# Patient Record
Sex: FEMALE | Race: WHITE | NOT HISPANIC OR LATINO | Employment: PART TIME | URBAN - METROPOLITAN AREA
[De-identification: names, ages, dates, MRNs, and addresses within clinical notes are randomized per-mention and may not be internally consistent; named-entity substitution may affect disease eponyms.]

---

## 2020-10-02 ENCOUNTER — OFFICE VISIT (OUTPATIENT)
Dept: CARDIOLOGY | Facility: MEDICAL CENTER | Age: 70
End: 2020-10-02
Payer: MEDICARE

## 2020-10-02 VITALS
HEIGHT: 64 IN | OXYGEN SATURATION: 97 % | DIASTOLIC BLOOD PRESSURE: 84 MMHG | WEIGHT: 237 LBS | SYSTOLIC BLOOD PRESSURE: 126 MMHG | BODY MASS INDEX: 40.46 KG/M2 | HEART RATE: 74 BPM

## 2020-10-02 DIAGNOSIS — I47.10 SVT (SUPRAVENTRICULAR TACHYCARDIA) (HCC): ICD-10-CM

## 2020-10-02 LAB — EKG IMPRESSION: NORMAL

## 2020-10-02 PROCEDURE — 93000 ELECTROCARDIOGRAM COMPLETE: CPT | Performed by: INTERNAL MEDICINE

## 2020-10-02 PROCEDURE — 99204 OFFICE O/P NEW MOD 45 MIN: CPT | Performed by: INTERNAL MEDICINE

## 2020-10-02 RX ORDER — CEPHALEXIN 500 MG/1
CAPSULE ORAL
Status: ON HOLD | COMMUNITY
Start: 2020-09-02 | End: 2022-01-01

## 2020-10-02 RX ORDER — OMEPRAZOLE 20 MG/1
CAPSULE, DELAYED RELEASE ORAL
Status: ON HOLD | COMMUNITY
Start: 2020-08-17 | End: 2022-01-01

## 2020-10-02 NOTE — PROGRESS NOTES
Arrhythmia Clinic Note (New patient)     DOS: 10/2/2020    Referring physician: Self referred    Chief complaint/Reason for consult: SVT    HPI: 69-year-old female without significant past medical history, gastric reflux, and paroxysmal SVT, presenting for evaluation.  She was first diagnosed with paroxysmal SVT in December.  Around [place], she had a significant episode of palpitations lasting 20 to 30 minutes.  She felt unwell and had to put her head between her legs.  She did not need to go to the emergency room.  She followed up with a Holter monitor which did show evidence of SVT, sustained, lasting 84 beats, nocturnally.  She does not feel this.  She does feel palpitations once or twice a month, usually last a few minutes.  She is able to terminate these with deep breathing through a towel.  She was prescribed metoprolol, but has not started taking it, she does not like taking medications.  She denies syncope or presyncope.  No chest pain on exertion.  No shortness of breath on exertion.    ROS (+ highlighted in bold):  Constitutional: Fevers/chills/fatigue/weightloss  HEENT: Blurry vision/eye pain/sore throat/hearing loss  Respiratory: Shortness of breath/cough  Cardiovascular: Chest pain/palpitations/edema/orthopnea/syncope  GI: Nausea/vomitting/diarrhea  MSK: Arthralgias/myagias/muscle weakness  Skin: Rash/sores  Neurological: Numbness/tremors/vertigo  Endocrine: Excessive thirst/polyuria/cold intolerance/heat intolerance  Psych: Depression/anxiety    History reviewed. No pertinent past medical history.   GERD  Paroxysmal SVT    History reviewed. No pertinent surgical history.    Social History     Socioeconomic History   • Marital status:      Spouse name: Not on file   • Number of children: Not on file   • Years of education: Not on file   • Highest education level: Not on file   Occupational History   • Not on file   Social Needs   • Financial resource strain: Not on file   • Food insecurity      "Worry: Not on file     Inability: Not on file   • Transportation needs     Medical: Not on file     Non-medical: Not on file   Tobacco Use   • Smoking status: Never Smoker   • Smokeless tobacco: Never Used   Substance and Sexual Activity   • Alcohol use: Not on file   • Drug use: Not on file   • Sexual activity: Not on file   Lifestyle   • Physical activity     Days per week: Not on file     Minutes per session: Not on file   • Stress: Not on file   Relationships   • Social connections     Talks on phone: Not on file     Gets together: Not on file     Attends Faith service: Not on file     Active member of club or organization: Not on file     Attends meetings of clubs or organizations: Not on file     Relationship status: Not on file   • Intimate partner violence     Fear of current or ex partner: Not on file     Emotionally abused: Not on file     Physically abused: Not on file     Forced sexual activity: Not on file   Other Topics Concern   • Not on file   Social History Narrative   • Not on file       History reviewed. No pertinent family history.   No family history of premature coronary disease    Allergies   Allergen Reactions   • Sulfa Drugs      itchy mouth and eyes       Current Outpatient Medications   Medication Sig Dispense Refill   • omeprazole (PRILOSEC) 20 MG delayed-release capsule take 1 tablet by mouth once daily 30 MINUTES BEFORE BREAKFAST     • cephALEXin (KEFLEX) 500 MG Cap take 2 capsules by mouth every 12 hours for 10 days     • metoprolol (LOPRESSOR) 25 MG Tab Take 25 mg by mouth. TWICE A DAY WITH FOOD       No current facility-administered medications for this visit.        Physical Exam:  Vitals:    10/02/20 1409   BP: 126/84   BP Location: Left arm   Patient Position: Sitting   BP Cuff Size: Large adult   Pulse: 74   SpO2: 97%   Weight: 107.5 kg (237 lb)   Height: 1.626 m (5' 4\")     General appearance: NAD, conversant   Eyes: anicteric sclerae, moist conjunctivae; no lid-lag; " PERRLA  HENT: Atraumatic; oropharynx clear with moist mucous membranes and no mucosal ulcerations; normal hard and soft palate  Neck: Trachea midline; FROM, supple, no thyromegaly or lymphadenopathy  Lungs: CTA, with normal respiratory effort and no intercostal retractions  CV: RRR, no MRGs, no JVD   Abdomen: Soft, non-tender; no masses or HSM  Extremities: No peripheral edema or extremity lymphadenopathy  Skin: Normal temperature, turgor and texture; no rash, ulcers or subcutaneous nodules  Psych: Appropriate affect, alert and oriented to person, place and time    Data:  Data from outside facility scanned into media tab and reviewed    EKG interpreted by me: Normal sinus rhythm    Holter monitor reviewed: 84 beat run of narrow complex SVT at 167 bpm    Impression/Plan:  1. SVT (supraventricular tachycardia) (HCC)  EKG     1.  Paroxysmal SVT.  Probable AVNRT or atrial tachycardia.  It is not terribly bothersome for her and she is able to terminate these with vagal maneuvers.  She does not want to take metoprolol that she needs to.  We did discuss EP study and ablation.  She wants to continue monitoring and see how she does.  If these symptoms become worse, she may elect to start taking her metoprolol, and she is always welcome to call and discuss an EP study with ablation if she would like.    Follow-up in 6 months    Simon Jessica MD  Cardiac Electrophysiology

## 2022-01-01 ENCOUNTER — APPOINTMENT (OUTPATIENT)
Dept: RADIOLOGY | Facility: MEDICAL CENTER | Age: 72
DRG: 023 | End: 2022-01-01
Attending: EMERGENCY MEDICINE
Payer: MEDICARE

## 2022-01-01 ENCOUNTER — NON-PROVIDER VISIT (OUTPATIENT)
Dept: CARDIOLOGY | Facility: MEDICAL CENTER | Age: 72
End: 2022-01-01
Payer: MEDICARE

## 2022-01-01 ENCOUNTER — APPOINTMENT (OUTPATIENT)
Dept: RADIOLOGY | Facility: MEDICAL CENTER | Age: 72
End: 2022-01-01
Attending: EMERGENCY MEDICINE
Payer: MEDICARE

## 2022-01-01 ENCOUNTER — APPOINTMENT (OUTPATIENT)
Dept: RADIOLOGY | Facility: MEDICAL CENTER | Age: 72
DRG: 023 | End: 2022-01-01
Attending: STUDENT IN AN ORGANIZED HEALTH CARE EDUCATION/TRAINING PROGRAM
Payer: MEDICARE

## 2022-01-01 ENCOUNTER — HOSPITAL ENCOUNTER (OUTPATIENT)
Facility: MEDICAL CENTER | Age: 72
End: 2022-04-16
Attending: EMERGENCY MEDICINE | Admitting: STUDENT IN AN ORGANIZED HEALTH CARE EDUCATION/TRAINING PROGRAM
Payer: MEDICARE

## 2022-01-01 ENCOUNTER — PATIENT OUTREACH (OUTPATIENT)
Dept: HEALTH INFORMATION MANAGEMENT | Facility: OTHER | Age: 72
End: 2022-01-01
Payer: MEDICARE

## 2022-01-01 ENCOUNTER — APPOINTMENT (OUTPATIENT)
Dept: CARDIOLOGY | Facility: MEDICAL CENTER | Age: 72
DRG: 023 | End: 2022-01-01
Attending: STUDENT IN AN ORGANIZED HEALTH CARE EDUCATION/TRAINING PROGRAM
Payer: MEDICARE

## 2022-01-01 ENCOUNTER — HOSPITAL ENCOUNTER (INPATIENT)
Facility: REHABILITATION | Age: 72
End: 2022-01-01
Attending: PHYSICAL MEDICINE & REHABILITATION | Admitting: PHYSICAL MEDICINE & REHABILITATION
Payer: MEDICARE

## 2022-01-01 ENCOUNTER — APPOINTMENT (OUTPATIENT)
Dept: RADIOLOGY | Facility: MEDICAL CENTER | Age: 72
End: 2022-01-01
Payer: MEDICARE

## 2022-01-01 ENCOUNTER — HOSPITAL ENCOUNTER (INPATIENT)
Facility: MEDICAL CENTER | Age: 72
LOS: 9 days | DRG: 023 | End: 2022-03-23
Attending: EMERGENCY MEDICINE | Admitting: INTERNAL MEDICINE
Payer: MEDICARE

## 2022-01-01 ENCOUNTER — APPOINTMENT (OUTPATIENT)
Dept: RADIOLOGY | Facility: MEDICAL CENTER | Age: 72
DRG: 023 | End: 2022-01-01
Attending: NURSE PRACTITIONER
Payer: MEDICARE

## 2022-01-01 VITALS
SYSTOLIC BLOOD PRESSURE: 114 MMHG | DIASTOLIC BLOOD PRESSURE: 54 MMHG | TEMPERATURE: 96.7 F | OXYGEN SATURATION: 92 % | WEIGHT: 205.91 LBS | BODY MASS INDEX: 33.09 KG/M2 | HEIGHT: 66 IN | RESPIRATION RATE: 18 BRPM | HEART RATE: 89 BPM

## 2022-01-01 VITALS
SYSTOLIC BLOOD PRESSURE: 185 MMHG | OXYGEN SATURATION: 82 % | DIASTOLIC BLOOD PRESSURE: 100 MMHG | BODY MASS INDEX: 34.16 KG/M2 | HEIGHT: 65 IN | WEIGHT: 205 LBS

## 2022-01-01 DIAGNOSIS — I47.29 NSVT (NONSUSTAINED VENTRICULAR TACHYCARDIA) (HCC): ICD-10-CM

## 2022-01-01 DIAGNOSIS — K86.89 PANCREATIC MASS: ICD-10-CM

## 2022-01-01 DIAGNOSIS — I63.9 ACUTE EMBOLIC STROKE (HCC): ICD-10-CM

## 2022-01-01 DIAGNOSIS — I63.9 CEREBROVASCULAR ACCIDENT (CVA), UNSPECIFIED MECHANISM (HCC): ICD-10-CM

## 2022-01-01 DIAGNOSIS — K21.00 GASTROESOPHAGEAL REFLUX DISEASE WITH ESOPHAGITIS WITHOUT HEMORRHAGE: ICD-10-CM

## 2022-01-01 DIAGNOSIS — I47.10 SVT (SUPRAVENTRICULAR TACHYCARDIA) (HCC): ICD-10-CM

## 2022-01-01 DIAGNOSIS — N39.0 URINARY TRACT INFECTION WITHOUT HEMATURIA, SITE UNSPECIFIED: ICD-10-CM

## 2022-01-01 DIAGNOSIS — I47.10 PAROXYSMAL SVT (SUPRAVENTRICULAR TACHYCARDIA) (HCC): ICD-10-CM

## 2022-01-01 LAB
ABO + RH BLD: NORMAL
ABO GROUP BLD: NORMAL
ALBUMIN SERPL BCP-MCNC: 1.8 G/DL (ref 3.2–4.9)
ALBUMIN SERPL BCP-MCNC: 2.6 G/DL (ref 3.2–4.9)
ALBUMIN SERPL BCP-MCNC: 2.8 G/DL (ref 3.2–4.9)
ALBUMIN SERPL BCP-MCNC: 2.9 G/DL (ref 3.2–4.9)
ALBUMIN SERPL BCP-MCNC: 3 G/DL (ref 3.2–4.9)
ALBUMIN SERPL BCP-MCNC: 3.7 G/DL (ref 3.2–4.9)
ALBUMIN/GLOB SERPL: 0.5 G/DL
ALBUMIN/GLOB SERPL: 0.9 G/DL
ALBUMIN/GLOB SERPL: 0.9 G/DL
ALBUMIN/GLOB SERPL: 1.1 G/DL
ALBUMIN/GLOB SERPL: 1.1 G/DL
ALP SERPL-CCNC: 171 U/L (ref 30–99)
ALP SERPL-CCNC: 176 U/L (ref 30–99)
ALP SERPL-CCNC: 212 U/L (ref 30–99)
ALP SERPL-CCNC: 229 U/L (ref 30–99)
ALP SERPL-CCNC: 231 U/L (ref 30–99)
ALP SERPL-CCNC: 636 U/L (ref 30–99)
ALT SERPL-CCNC: 21 U/L (ref 2–50)
ALT SERPL-CCNC: 23 U/L (ref 2–50)
ALT SERPL-CCNC: 244 U/L (ref 2–50)
ALT SERPL-CCNC: 26 U/L (ref 2–50)
ALT SERPL-CCNC: 29 U/L (ref 2–50)
ALT SERPL-CCNC: 39 U/L (ref 2–50)
ANION GAP SERPL CALC-SCNC: 10 MMOL/L (ref 7–16)
ANION GAP SERPL CALC-SCNC: 10 MMOL/L (ref 7–16)
ANION GAP SERPL CALC-SCNC: 11 MMOL/L (ref 7–16)
ANION GAP SERPL CALC-SCNC: 12 MMOL/L (ref 7–16)
ANION GAP SERPL CALC-SCNC: 30 MMOL/L (ref 7–16)
ANION GAP SERPL CALC-SCNC: 8 MMOL/L (ref 7–16)
ANION GAP SERPL CALC-SCNC: 9 MMOL/L (ref 7–16)
ANION GAP SERPL CALC-SCNC: 9 MMOL/L (ref 7–16)
ANISOCYTOSIS BLD QL SMEAR: ABNORMAL
APPEARANCE UR: ABNORMAL
APTT PPP: 198.5 SEC (ref 24.7–36)
APTT PPP: 30.2 SEC (ref 24.7–36)
AST SERPL-CCNC: 1412 U/L (ref 12–45)
AST SERPL-CCNC: 47 U/L (ref 12–45)
AST SERPL-CCNC: 55 U/L (ref 12–45)
AST SERPL-CCNC: 63 U/L (ref 12–45)
AST SERPL-CCNC: 70 U/L (ref 12–45)
AST SERPL-CCNC: 70 U/L (ref 12–45)
BACTERIA #/AREA URNS HPF: ABNORMAL /HPF
BACTERIA UR CULT: ABNORMAL
BACTERIA UR CULT: ABNORMAL
BASOPHILS # BLD AUTO: 0.6 % (ref 0–1.8)
BASOPHILS # BLD AUTO: 0.6 % (ref 0–1.8)
BASOPHILS # BLD AUTO: 0.7 % (ref 0–1.8)
BASOPHILS # BLD AUTO: 0.8 % (ref 0–1.8)
BASOPHILS # BLD AUTO: 0.8 % (ref 0–1.8)
BASOPHILS # BLD AUTO: 0.9 % (ref 0–1.8)
BASOPHILS # BLD: 0.08 K/UL (ref 0–0.12)
BASOPHILS # BLD: 0.09 K/UL (ref 0–0.12)
BASOPHILS # BLD: 0.1 K/UL (ref 0–0.12)
BASOPHILS # BLD: 0.11 K/UL (ref 0–0.12)
BASOPHILS # BLD: 0.11 K/UL (ref 0–0.12)
BASOPHILS # BLD: 0.22 K/UL (ref 0–0.12)
BILIRUB CONJ SERPL-MCNC: <0.2 MG/DL (ref 0.1–0.5)
BILIRUB INDIRECT SERPL-MCNC: ABNORMAL MG/DL (ref 0–1)
BILIRUB SERPL-MCNC: 0.7 MG/DL (ref 0.1–1.5)
BILIRUB SERPL-MCNC: 0.8 MG/DL (ref 0.1–1.5)
BILIRUB SERPL-MCNC: 0.8 MG/DL (ref 0.1–1.5)
BILIRUB SERPL-MCNC: 1 MG/DL (ref 0.1–1.5)
BILIRUB SERPL-MCNC: 1.2 MG/DL (ref 0.1–1.5)
BILIRUB SERPL-MCNC: 2.5 MG/DL (ref 0.1–1.5)
BILIRUB UR QL STRIP.AUTO: NEGATIVE
BLD GP AB SCN SERPL QL: NORMAL
BUN SERPL-MCNC: 12 MG/DL (ref 8–22)
BUN SERPL-MCNC: 13 MG/DL (ref 8–22)
BUN SERPL-MCNC: 17 MG/DL (ref 8–22)
BUN SERPL-MCNC: 22 MG/DL (ref 8–22)
BUN SERPL-MCNC: 29 MG/DL (ref 8–22)
BUN SERPL-MCNC: 33 MG/DL (ref 8–22)
BUN SERPL-MCNC: 34 MG/DL (ref 8–22)
BUN SERPL-MCNC: 57 MG/DL (ref 8–22)
BURR CELLS BLD QL SMEAR: NORMAL
CALCIUM SERPL-MCNC: 7.4 MG/DL (ref 8.5–10.5)
CALCIUM SERPL-MCNC: 7.9 MG/DL (ref 8.5–10.5)
CALCIUM SERPL-MCNC: 8 MG/DL (ref 8.5–10.5)
CALCIUM SERPL-MCNC: 8.1 MG/DL (ref 8.5–10.5)
CALCIUM SERPL-MCNC: 8.2 MG/DL (ref 8.5–10.5)
CALCIUM SERPL-MCNC: 8.3 MG/DL (ref 8.5–10.5)
CALCIUM SERPL-MCNC: 9.4 MG/DL (ref 8.5–10.5)
CHLORIDE SERPL-SCNC: 100 MMOL/L (ref 96–112)
CHLORIDE SERPL-SCNC: 100 MMOL/L (ref 96–112)
CHLORIDE SERPL-SCNC: 101 MMOL/L (ref 96–112)
CHLORIDE SERPL-SCNC: 102 MMOL/L (ref 96–112)
CHLORIDE SERPL-SCNC: 104 MMOL/L (ref 96–112)
CHLORIDE SERPL-SCNC: 105 MMOL/L (ref 96–112)
CHLORIDE SERPL-SCNC: 88 MMOL/L (ref 96–112)
CHOLEST SERPL-MCNC: 156 MG/DL (ref 100–199)
CO2 SERPL-SCNC: 21 MMOL/L (ref 20–33)
CO2 SERPL-SCNC: 23 MMOL/L (ref 20–33)
CO2 SERPL-SCNC: 24 MMOL/L (ref 20–33)
CO2 SERPL-SCNC: 25 MMOL/L (ref 20–33)
CO2 SERPL-SCNC: 25 MMOL/L (ref 20–33)
CO2 SERPL-SCNC: 26 MMOL/L (ref 20–33)
CO2 SERPL-SCNC: 26 MMOL/L (ref 20–33)
CO2 SERPL-SCNC: 6 MMOL/L (ref 20–33)
COLOR UR: YELLOW
CREAT SERPL-MCNC: 0.69 MG/DL (ref 0.5–1.4)
CREAT SERPL-MCNC: 0.71 MG/DL (ref 0.5–1.4)
CREAT SERPL-MCNC: 0.76 MG/DL (ref 0.5–1.4)
CREAT SERPL-MCNC: 0.78 MG/DL (ref 0.5–1.4)
CREAT SERPL-MCNC: 0.78 MG/DL (ref 0.5–1.4)
CREAT SERPL-MCNC: 0.81 MG/DL (ref 0.5–1.4)
CREAT SERPL-MCNC: 0.82 MG/DL (ref 0.5–1.4)
CREAT SERPL-MCNC: 0.88 MG/DL (ref 0.5–1.4)
CREAT SERPL-MCNC: 0.94 MG/DL (ref 0.5–1.4)
CREAT SERPL-MCNC: 3.56 MG/DL (ref 0.5–1.4)
EKG IMPRESSION: NORMAL
EOSINOPHIL # BLD AUTO: 0.06 K/UL (ref 0–0.51)
EOSINOPHIL # BLD AUTO: 0.15 K/UL (ref 0–0.51)
EOSINOPHIL # BLD AUTO: 0.16 K/UL (ref 0–0.51)
EOSINOPHIL # BLD AUTO: 0.18 K/UL (ref 0–0.51)
EOSINOPHIL # BLD AUTO: 0.22 K/UL (ref 0–0.51)
EOSINOPHIL # BLD AUTO: 0.23 K/UL (ref 0–0.51)
EOSINOPHIL # BLD AUTO: 0.29 K/UL (ref 0–0.51)
EOSINOPHIL # BLD AUTO: 0.3 K/UL (ref 0–0.51)
EOSINOPHIL NFR BLD: 0.5 % (ref 0–6.9)
EOSINOPHIL NFR BLD: 0.9 % (ref 0–6.9)
EOSINOPHIL NFR BLD: 1.1 % (ref 0–6.9)
EOSINOPHIL NFR BLD: 1.4 % (ref 0–6.9)
EOSINOPHIL NFR BLD: 1.5 % (ref 0–6.9)
EOSINOPHIL NFR BLD: 1.6 % (ref 0–6.9)
EOSINOPHIL NFR BLD: 1.9 % (ref 0–6.9)
EOSINOPHIL NFR BLD: 2.4 % (ref 0–6.9)
EPI CELLS #/AREA URNS HPF: NEGATIVE /HPF
ERYTHROCYTE [DISTWIDTH] IN BLOOD BY AUTOMATED COUNT: 49.1 FL (ref 35.9–50)
ERYTHROCYTE [DISTWIDTH] IN BLOOD BY AUTOMATED COUNT: 49.6 FL (ref 35.9–50)
ERYTHROCYTE [DISTWIDTH] IN BLOOD BY AUTOMATED COUNT: 49.7 FL (ref 35.9–50)
ERYTHROCYTE [DISTWIDTH] IN BLOOD BY AUTOMATED COUNT: 49.8 FL (ref 35.9–50)
ERYTHROCYTE [DISTWIDTH] IN BLOOD BY AUTOMATED COUNT: 50.1 FL (ref 35.9–50)
ERYTHROCYTE [DISTWIDTH] IN BLOOD BY AUTOMATED COUNT: 50.4 FL (ref 35.9–50)
ERYTHROCYTE [DISTWIDTH] IN BLOOD BY AUTOMATED COUNT: 51 FL (ref 35.9–50)
ERYTHROCYTE [DISTWIDTH] IN BLOOD BY AUTOMATED COUNT: 51.2 FL (ref 35.9–50)
ERYTHROCYTE [DISTWIDTH] IN BLOOD BY AUTOMATED COUNT: 51.5 FL (ref 35.9–50)
ERYTHROCYTE [DISTWIDTH] IN BLOOD BY AUTOMATED COUNT: 75.8 FL (ref 35.9–50)
ERYTHROCYTE [SEDIMENTATION RATE] IN BLOOD BY WESTERGREN METHOD: 23 MM/HOUR (ref 0–25)
EST. AVERAGE GLUCOSE BLD GHB EST-MCNC: 77 MG/DL
ETHANOL BLD-MCNC: <10.1 MG/DL (ref 0–10)
FOLATE SERPL-MCNC: 12.8 NG/ML
GFR SERPLBLD CREATININE-BSD FMLA CKD-EPI: 13 ML/MIN/1.73 M 2
GFR SERPLBLD CREATININE-BSD FMLA CKD-EPI: 64 ML/MIN/1.73 M 2
GFR SERPLBLD CREATININE-BSD FMLA CKD-EPI: 76 ML/MIN/1.73 M 2
GFR SERPLBLD CREATININE-BSD FMLA CKD-EPI: 77 ML/MIN/1.73 M 2
GFR SERPLBLD CREATININE-BSD FMLA CKD-EPI: 81 ML/MIN/1.73 M 2
GFR SERPLBLD CREATININE-BSD FMLA CKD-EPI: 81 ML/MIN/1.73 M 2
GFR SERPLBLD CREATININE-BSD FMLA CKD-EPI: 83 ML/MIN/1.73 M 2
GFR SERPLBLD CREATININE-BSD FMLA CKD-EPI: 90 ML/MIN/1.73 M 2
GFR SERPLBLD CREATININE-BSD FMLA CKD-EPI: 92 ML/MIN/1.73 M 2
GLOBULIN SER CALC-MCNC: 2.7 G/DL (ref 1.9–3.5)
GLOBULIN SER CALC-MCNC: 2.9 G/DL (ref 1.9–3.5)
GLOBULIN SER CALC-MCNC: 3.1 G/DL (ref 1.9–3.5)
GLOBULIN SER CALC-MCNC: 3.4 G/DL (ref 1.9–3.5)
GLOBULIN SER CALC-MCNC: 3.8 G/DL (ref 1.9–3.5)
GLUCOSE SERPL-MCNC: 104 MG/DL (ref 65–99)
GLUCOSE SERPL-MCNC: 104 MG/DL (ref 65–99)
GLUCOSE SERPL-MCNC: 105 MG/DL (ref 65–99)
GLUCOSE SERPL-MCNC: 106 MG/DL (ref 65–99)
GLUCOSE SERPL-MCNC: 110 MG/DL (ref 65–99)
GLUCOSE SERPL-MCNC: 110 MG/DL (ref 65–99)
GLUCOSE SERPL-MCNC: 114 MG/DL (ref 65–99)
GLUCOSE SERPL-MCNC: 118 MG/DL (ref 65–99)
GLUCOSE SERPL-MCNC: 119 MG/DL (ref 65–99)
GLUCOSE SERPL-MCNC: 45 MG/DL (ref 65–99)
GLUCOSE UR STRIP.AUTO-MCNC: NEGATIVE MG/DL
HBA1C MFR BLD: 4.3 % (ref 4–5.6)
HCG SERPL QL: NEGATIVE
HCT VFR BLD AUTO: 25.3 % (ref 37–47)
HCT VFR BLD AUTO: 25.5 % (ref 37–47)
HCT VFR BLD AUTO: 26.4 % (ref 37–47)
HCT VFR BLD AUTO: 27.5 % (ref 37–47)
HCT VFR BLD AUTO: 27.7 % (ref 37–47)
HCT VFR BLD AUTO: 27.7 % (ref 37–47)
HCT VFR BLD AUTO: 28.5 % (ref 37–47)
HCT VFR BLD AUTO: 28.6 % (ref 37–47)
HCT VFR BLD AUTO: 29.5 % (ref 37–47)
HCT VFR BLD AUTO: 35.8 % (ref 37–47)
HDLC SERPL-MCNC: 31 MG/DL
HEMOCCULT STL QL: NEGATIVE
HGB BLD-MCNC: 11.7 G/DL (ref 12–16)
HGB BLD-MCNC: 7.9 G/DL (ref 12–16)
HGB BLD-MCNC: 8 G/DL (ref 12–16)
HGB BLD-MCNC: 8 G/DL (ref 12–16)
HGB BLD-MCNC: 8.3 G/DL (ref 12–16)
HGB BLD-MCNC: 8.8 G/DL (ref 12–16)
HGB BLD-MCNC: 8.9 G/DL (ref 12–16)
HGB BLD-MCNC: 9 G/DL (ref 12–16)
HGB BLD-MCNC: 9.2 G/DL (ref 12–16)
HGB BLD-MCNC: 9.4 G/DL (ref 12–16)
HGB RETIC QN AUTO: 35.8 PG/CELL (ref 29–35)
HYALINE CASTS #/AREA URNS LPF: ABNORMAL /LPF
IMM GRANULOCYTES # BLD AUTO: 0.04 K/UL (ref 0–0.11)
IMM GRANULOCYTES # BLD AUTO: 0.05 K/UL (ref 0–0.11)
IMM GRANULOCYTES # BLD AUTO: 0.06 K/UL (ref 0–0.11)
IMM GRANULOCYTES # BLD AUTO: 0.07 K/UL (ref 0–0.11)
IMM GRANULOCYTES # BLD AUTO: 0.09 K/UL (ref 0–0.11)
IMM GRANULOCYTES # BLD AUTO: 0.1 K/UL (ref 0–0.11)
IMM GRANULOCYTES # BLD AUTO: 0.11 K/UL (ref 0–0.11)
IMM GRANULOCYTES NFR BLD AUTO: 0.4 % (ref 0–0.9)
IMM GRANULOCYTES NFR BLD AUTO: 0.4 % (ref 0–0.9)
IMM GRANULOCYTES NFR BLD AUTO: 0.5 % (ref 0–0.9)
IMM GRANULOCYTES NFR BLD AUTO: 0.6 % (ref 0–0.9)
IMM GRANULOCYTES NFR BLD AUTO: 0.6 % (ref 0–0.9)
IMM GRANULOCYTES NFR BLD AUTO: 0.7 % (ref 0–0.9)
IMM GRANULOCYTES NFR BLD AUTO: 0.7 % (ref 0–0.9)
IMM RETICS NFR: 22.3 % (ref 9.3–17.4)
INR PPP: 1.41 (ref 0.87–1.13)
INR PPP: >=10 (ref 0.87–1.13)
KETONES UR STRIP.AUTO-MCNC: NEGATIVE MG/DL
LACTATE BLD-SCNC: 14.4 MMOL/L (ref 0.5–2)
LDLC SERPL CALC-MCNC: 100 MG/DL
LEUKOCYTE ESTERASE UR QL STRIP.AUTO: ABNORMAL
LIPASE SERPL-CCNC: 408 U/L (ref 11–82)
LV EJECT FRACT  99904: 60
LV EJECT FRACT MOD 2C 99903: 59.83
LV EJECT FRACT MOD 4C 99902: 71.45
LV EJECT FRACT MOD BP 99901: 64.16
LYMPHOCYTES # BLD AUTO: 1.43 K/UL (ref 1–4.8)
LYMPHOCYTES # BLD AUTO: 1.53 K/UL (ref 1–4.8)
LYMPHOCYTES # BLD AUTO: 1.64 K/UL (ref 1–4.8)
LYMPHOCYTES # BLD AUTO: 1.81 K/UL (ref 1–4.8)
LYMPHOCYTES # BLD AUTO: 1.87 K/UL (ref 1–4.8)
LYMPHOCYTES # BLD AUTO: 1.9 K/UL (ref 1–4.8)
LYMPHOCYTES # BLD AUTO: 1.92 K/UL (ref 1–4.8)
LYMPHOCYTES # BLD AUTO: 2.17 K/UL (ref 1–4.8)
LYMPHOCYTES NFR BLD: 11.1 % (ref 22–41)
LYMPHOCYTES NFR BLD: 11.6 % (ref 22–41)
LYMPHOCYTES NFR BLD: 12.6 % (ref 22–41)
LYMPHOCYTES NFR BLD: 14.4 % (ref 22–41)
LYMPHOCYTES NFR BLD: 14.7 % (ref 22–41)
LYMPHOCYTES NFR BLD: 16.3 % (ref 22–41)
LYMPHOCYTES NFR BLD: 18.2 % (ref 22–41)
LYMPHOCYTES NFR BLD: 6.3 % (ref 22–41)
MACROCYTES BLD QL SMEAR: ABNORMAL
MAGNESIUM SERPL-MCNC: 2.1 MG/DL (ref 1.5–2.5)
MAGNESIUM SERPL-MCNC: 3.5 MG/DL (ref 1.5–2.5)
MANUAL DIFF BLD: NORMAL
MCH RBC QN AUTO: 32.6 PG (ref 27–33)
MCH RBC QN AUTO: 32.6 PG (ref 27–33)
MCH RBC QN AUTO: 32.7 PG (ref 27–33)
MCH RBC QN AUTO: 32.7 PG (ref 27–33)
MCH RBC QN AUTO: 32.8 PG (ref 27–33)
MCH RBC QN AUTO: 32.9 PG (ref 27–33)
MCH RBC QN AUTO: 33 PG (ref 27–33)
MCH RBC QN AUTO: 33 PG (ref 27–33)
MCH RBC QN AUTO: 33.1 PG (ref 27–33)
MCH RBC QN AUTO: 33.1 PG (ref 27–33)
MCHC RBC AUTO-ENTMCNC: 28.5 G/DL (ref 33.6–35)
MCHC RBC AUTO-ENTMCNC: 31.4 G/DL (ref 33.6–35)
MCHC RBC AUTO-ENTMCNC: 31.4 G/DL (ref 33.6–35)
MCHC RBC AUTO-ENTMCNC: 31.5 G/DL (ref 33.6–35)
MCHC RBC AUTO-ENTMCNC: 31.6 G/DL (ref 33.6–35)
MCHC RBC AUTO-ENTMCNC: 31.9 G/DL (ref 33.6–35)
MCHC RBC AUTO-ENTMCNC: 32 G/DL (ref 33.6–35)
MCHC RBC AUTO-ENTMCNC: 32.1 G/DL (ref 33.6–35)
MCHC RBC AUTO-ENTMCNC: 32.3 G/DL (ref 33.6–35)
MCHC RBC AUTO-ENTMCNC: 32.7 G/DL (ref 33.6–35)
MCV RBC AUTO: 100.8 FL (ref 81.4–97.8)
MCV RBC AUTO: 101.5 FL (ref 81.4–97.8)
MCV RBC AUTO: 102.2 FL (ref 81.4–97.8)
MCV RBC AUTO: 102.2 FL (ref 81.4–97.8)
MCV RBC AUTO: 102.4 FL (ref 81.4–97.8)
MCV RBC AUTO: 103.7 FL (ref 81.4–97.8)
MCV RBC AUTO: 103.9 FL (ref 81.4–97.8)
MCV RBC AUTO: 104.9 FL (ref 81.4–97.8)
MCV RBC AUTO: 105.1 FL (ref 81.4–97.8)
MCV RBC AUTO: 115.9 FL (ref 81.4–97.8)
METAMYELOCYTES NFR BLD MANUAL: 3.6 %
MICRO URNS: ABNORMAL
MONOCYTES # BLD AUTO: 0.87 K/UL (ref 0–0.85)
MONOCYTES # BLD AUTO: 1 K/UL (ref 0–0.85)
MONOCYTES # BLD AUTO: 1.14 K/UL (ref 0–0.85)
MONOCYTES # BLD AUTO: 1.25 K/UL (ref 0–0.85)
MONOCYTES # BLD AUTO: 1.26 K/UL (ref 0–0.85)
MONOCYTES # BLD AUTO: 1.31 K/UL (ref 0–0.85)
MONOCYTES # BLD AUTO: 1.52 K/UL (ref 0–0.85)
MONOCYTES # BLD AUTO: 1.69 K/UL (ref 0–0.85)
MONOCYTES NFR BLD AUTO: 10 % (ref 0–13.4)
MONOCYTES NFR BLD AUTO: 10.1 % (ref 0–13.4)
MONOCYTES NFR BLD AUTO: 10.3 % (ref 0–13.4)
MONOCYTES NFR BLD AUTO: 10.5 % (ref 0–13.4)
MONOCYTES NFR BLD AUTO: 10.6 % (ref 0–13.4)
MONOCYTES NFR BLD AUTO: 3.6 % (ref 0–13.4)
MONOCYTES NFR BLD AUTO: 8.6 % (ref 0–13.4)
MONOCYTES NFR BLD AUTO: 9.9 % (ref 0–13.4)
MORPHOLOGY BLD-IMP: NORMAL
NEUTROPHILS # BLD AUTO: 11.28 K/UL (ref 2–7.15)
NEUTROPHILS # BLD AUTO: 11.45 K/UL (ref 2–7.15)
NEUTROPHILS # BLD AUTO: 12.32 K/UL (ref 2–7.15)
NEUTROPHILS # BLD AUTO: 20.36 K/UL (ref 2–7.15)
NEUTROPHILS # BLD AUTO: 7.25 K/UL (ref 2–7.15)
NEUTROPHILS # BLD AUTO: 8.17 K/UL (ref 2–7.15)
NEUTROPHILS # BLD AUTO: 8.31 K/UL (ref 2–7.15)
NEUTROPHILS # BLD AUTO: 8.75 K/UL (ref 2–7.15)
NEUTROPHILS NFR BLD: 69.5 % (ref 44–72)
NEUTROPHILS NFR BLD: 71.1 % (ref 44–72)
NEUTROPHILS NFR BLD: 71.1 % (ref 44–72)
NEUTROPHILS NFR BLD: 72.8 % (ref 44–72)
NEUTROPHILS NFR BLD: 74.2 % (ref 44–72)
NEUTROPHILS NFR BLD: 75.3 % (ref 44–72)
NEUTROPHILS NFR BLD: 77.9 % (ref 44–72)
NEUTROPHILS NFR BLD: 82 % (ref 44–72)
NEUTS BAND NFR BLD MANUAL: 1.8 % (ref 0–10)
NITRITE UR QL STRIP.AUTO: POSITIVE
NRBC # BLD AUTO: 0 K/UL
NRBC # BLD AUTO: 0.09 K/UL
NRBC BLD-RTO: 0 /100 WBC
NRBC BLD-RTO: 0.4 /100 WBC
NT-PROBNP SERPL IA-MCNC: 7297 PG/ML (ref 0–125)
PH UR STRIP.AUTO: 6.5 [PH] (ref 5–8)
PHOSPHATE SERPL-MCNC: 10.7 MG/DL (ref 2.5–4.5)
PLATELET # BLD AUTO: 179 K/UL (ref 164–446)
PLATELET # BLD AUTO: 198 K/UL (ref 164–446)
PLATELET # BLD AUTO: 202 K/UL (ref 164–446)
PLATELET # BLD AUTO: 204 K/UL (ref 164–446)
PLATELET # BLD AUTO: 211 K/UL (ref 164–446)
PLATELET # BLD AUTO: 223 K/UL (ref 164–446)
PLATELET # BLD AUTO: 246 K/UL (ref 164–446)
PLATELET # BLD AUTO: 250 K/UL (ref 164–446)
PLATELET # BLD AUTO: 251 K/UL (ref 164–446)
PLATELET # BLD AUTO: 55 K/UL (ref 164–446)
PLATELET BLD QL SMEAR: NORMAL
PLATELETS.RETICULATED NFR BLD AUTO: 12.9 K/UL (ref 0.6–13.1)
PMV BLD AUTO: 10 FL (ref 9–12.9)
PMV BLD AUTO: 10.3 FL (ref 9–12.9)
PMV BLD AUTO: 10.4 FL (ref 9–12.9)
PMV BLD AUTO: 10.5 FL (ref 9–12.9)
PMV BLD AUTO: 10.6 FL (ref 9–12.9)
PMV BLD AUTO: 10.6 FL (ref 9–12.9)
PMV BLD AUTO: 10.8 FL (ref 9–12.9)
PMV BLD AUTO: 11.2 FL (ref 9–12.9)
PMV BLD AUTO: 13.7 FL (ref 9–12.9)
PMV BLD AUTO: 9.8 FL (ref 9–12.9)
POIKILOCYTOSIS BLD QL SMEAR: NORMAL
POLYCHROMASIA BLD QL SMEAR: NORMAL
POTASSIUM SERPL-SCNC: 4.2 MMOL/L (ref 3.6–5.5)
POTASSIUM SERPL-SCNC: 4.3 MMOL/L (ref 3.6–5.5)
POTASSIUM SERPL-SCNC: 4.3 MMOL/L (ref 3.6–5.5)
POTASSIUM SERPL-SCNC: 4.4 MMOL/L (ref 3.6–5.5)
POTASSIUM SERPL-SCNC: 4.4 MMOL/L (ref 3.6–5.5)
POTASSIUM SERPL-SCNC: 4.5 MMOL/L (ref 3.6–5.5)
POTASSIUM SERPL-SCNC: 4.6 MMOL/L (ref 3.6–5.5)
POTASSIUM SERPL-SCNC: 7.3 MMOL/L (ref 3.6–5.5)
PROMYELOCYTES NFR BLD MANUAL: 0.9 %
PROT SERPL-MCNC: 5.5 G/DL (ref 6–8.2)
PROT SERPL-MCNC: 5.6 G/DL (ref 6–8.2)
PROT SERPL-MCNC: 5.7 G/DL (ref 6–8.2)
PROT SERPL-MCNC: 5.7 G/DL (ref 6–8.2)
PROT SERPL-MCNC: 5.9 G/DL (ref 6–8.2)
PROT SERPL-MCNC: 7.1 G/DL (ref 6–8.2)
PROT UR QL STRIP: 30 MG/DL
PROTHROMBIN TIME: 16.8 SEC (ref 12–14.6)
PROTHROMBIN TIME: >120 SEC (ref 12–14.6)
RBC # BLD AUTO: 2.39 M/UL (ref 4.2–5.4)
RBC # BLD AUTO: 2.43 M/UL (ref 4.2–5.4)
RBC # BLD AUTO: 2.44 M/UL (ref 4.2–5.4)
RBC # BLD AUTO: 2.54 M/UL (ref 4.2–5.4)
RBC # BLD AUTO: 2.69 M/UL (ref 4.2–5.4)
RBC # BLD AUTO: 2.72 M/UL (ref 4.2–5.4)
RBC # BLD AUTO: 2.73 M/UL (ref 4.2–5.4)
RBC # BLD AUTO: 2.79 M/UL (ref 4.2–5.4)
RBC # BLD AUTO: 2.88 M/UL (ref 4.2–5.4)
RBC # BLD AUTO: 3.55 M/UL (ref 4.2–5.4)
RBC # URNS HPF: ABNORMAL /HPF
RBC BLD AUTO: PRESENT
RBC UR QL AUTO: ABNORMAL
RETICS # AUTO: 0.11 M/UL (ref 0.04–0.06)
RETICS/RBC NFR: 4.4 % (ref 0.8–2.1)
RH BLD: NORMAL
SCHISTOCYTES BLD QL SMEAR: NORMAL
SIGNIFICANT IND 70042: ABNORMAL
SITE SITE: ABNORMAL
SODIUM SERPL-SCNC: 124 MMOL/L (ref 135–145)
SODIUM SERPL-SCNC: 134 MMOL/L (ref 135–145)
SODIUM SERPL-SCNC: 135 MMOL/L (ref 135–145)
SODIUM SERPL-SCNC: 136 MMOL/L (ref 135–145)
SODIUM SERPL-SCNC: 136 MMOL/L (ref 135–145)
SODIUM SERPL-SCNC: 137 MMOL/L (ref 135–145)
SODIUM SERPL-SCNC: 138 MMOL/L (ref 135–145)
SOURCE SOURCE: ABNORMAL
SP GR UR STRIP.AUTO: 1.03
TRIGL SERPL-MCNC: 125 MG/DL (ref 0–149)
TROPONIN T SERPL-MCNC: 395 NG/L (ref 6–19)
TROPONIN T SERPL-MCNC: 51 NG/L (ref 6–19)
TSH SERPL DL<=0.005 MIU/L-ACNC: 2.53 UIU/ML (ref 0.38–5.33)
UROBILINOGEN UR STRIP.AUTO-MCNC: 1 MG/DL
VIT B12 SERPL-MCNC: 3515 PG/ML (ref 211–911)
WBC # BLD AUTO: 10 K/UL (ref 4.8–10.8)
WBC # BLD AUTO: 11.5 K/UL (ref 4.8–10.8)
WBC # BLD AUTO: 12 K/UL (ref 4.8–10.8)
WBC # BLD AUTO: 12.3 K/UL (ref 4.8–10.8)
WBC # BLD AUTO: 13.2 K/UL (ref 4.8–10.8)
WBC # BLD AUTO: 14.7 K/UL (ref 4.8–10.8)
WBC # BLD AUTO: 15.2 K/UL (ref 4.8–10.8)
WBC # BLD AUTO: 15.8 K/UL (ref 4.8–10.8)
WBC # BLD AUTO: 16.4 K/UL (ref 4.8–10.8)
WBC # BLD AUTO: 24.3 K/UL (ref 4.8–10.8)
WBC #/AREA URNS HPF: ABNORMAL /HPF

## 2022-01-01 PROCEDURE — 700101 HCHG RX REV CODE 250

## 2022-01-01 PROCEDURE — 700111 HCHG RX REV CODE 636 W/ 250 OVERRIDE (IP): Performed by: STUDENT IN AN ORGANIZED HEALTH CARE EDUCATION/TRAINING PROGRAM

## 2022-01-01 PROCEDURE — 700117 HCHG RX CONTRAST REV CODE 255: Performed by: EMERGENCY MEDICINE

## 2022-01-01 PROCEDURE — 99223 1ST HOSP IP/OBS HIGH 75: CPT | Mod: FS | Performed by: STUDENT IN AN ORGANIZED HEALTH CARE EDUCATION/TRAINING PROGRAM

## 2022-01-01 PROCEDURE — A9270 NON-COVERED ITEM OR SERVICE: HCPCS | Performed by: STUDENT IN AN ORGANIZED HEALTH CARE EDUCATION/TRAINING PROGRAM

## 2022-01-01 PROCEDURE — 80048 BASIC METABOLIC PNL TOTAL CA: CPT

## 2022-01-01 PROCEDURE — 03CG3ZZ EXTIRPATION OF MATTER FROM INTRACRANIAL ARTERY, PERCUTANEOUS APPROACH: ICD-10-PCS | Performed by: RADIOLOGY

## 2022-01-01 PROCEDURE — 96374 THER/PROPH/DIAG INJ IV PUSH: CPT

## 2022-01-01 PROCEDURE — 36415 COLL VENOUS BLD VENIPUNCTURE: CPT

## 2022-01-01 PROCEDURE — 93306 TTE W/DOPPLER COMPLETE: CPT

## 2022-01-01 PROCEDURE — 97535 SELF CARE MNGMENT TRAINING: CPT

## 2022-01-01 PROCEDURE — 96374 THER/PROPH/DIAG INJ IV PUSH: CPT | Mod: XU

## 2022-01-01 PROCEDURE — A9270 NON-COVERED ITEM OR SERVICE: HCPCS | Performed by: HOSPITALIST

## 2022-01-01 PROCEDURE — 700102 HCHG RX REV CODE 250 W/ 637 OVERRIDE(OP): Performed by: STUDENT IN AN ORGANIZED HEALTH CARE EDUCATION/TRAINING PROGRAM

## 2022-01-01 PROCEDURE — 83735 ASSAY OF MAGNESIUM: CPT

## 2022-01-01 PROCEDURE — 700117 HCHG RX CONTRAST REV CODE 255: Performed by: NURSE PRACTITIONER

## 2022-01-01 PROCEDURE — 85025 COMPLETE CBC W/AUTO DIFF WBC: CPT

## 2022-01-01 PROCEDURE — 71045 X-RAY EXAM CHEST 1 VIEW: CPT

## 2022-01-01 PROCEDURE — 770020 HCHG ROOM/CARE - TELE (206)

## 2022-01-01 PROCEDURE — 94002 VENT MGMT INPAT INIT DAY: CPT

## 2022-01-01 PROCEDURE — 99285 EMERGENCY DEPT VISIT HI MDM: CPT

## 2022-01-01 PROCEDURE — 700105 HCHG RX REV CODE 258: Performed by: STUDENT IN AN ORGANIZED HEALTH CARE EDUCATION/TRAINING PROGRAM

## 2022-01-01 PROCEDURE — 70450 CT HEAD/BRAIN W/O DYE: CPT | Mod: MC

## 2022-01-01 PROCEDURE — 84703 CHORIONIC GONADOTROPIN ASSAY: CPT

## 2022-01-01 PROCEDURE — 700111 HCHG RX REV CODE 636 W/ 250 OVERRIDE (IP): Performed by: EMERGENCY MEDICINE

## 2022-01-01 PROCEDURE — 83036 HEMOGLOBIN GLYCOSYLATED A1C: CPT

## 2022-01-01 PROCEDURE — 700111 HCHG RX REV CODE 636 W/ 250 OVERRIDE (IP): Performed by: HOSPITALIST

## 2022-01-01 PROCEDURE — 700111 HCHG RX REV CODE 636 W/ 250 OVERRIDE (IP)

## 2022-01-01 PROCEDURE — 83880 ASSAY OF NATRIURETIC PEPTIDE: CPT

## 2022-01-01 PROCEDURE — 86900 BLOOD TYPING SEROLOGIC ABO: CPT

## 2022-01-01 PROCEDURE — 70551 MRI BRAIN STEM W/O DYE: CPT | Mod: MG

## 2022-01-01 PROCEDURE — 85007 BL SMEAR W/DIFF WBC COUNT: CPT

## 2022-01-01 PROCEDURE — 700102 HCHG RX REV CODE 250 W/ 637 OVERRIDE(OP): Performed by: NURSE PRACTITIONER

## 2022-01-01 PROCEDURE — 80061 LIPID PANEL: CPT

## 2022-01-01 PROCEDURE — 99220 PR INITIAL OBSERVATION CARE,LEVL III: CPT | Performed by: STUDENT IN AN ORGANIZED HEALTH CARE EDUCATION/TRAINING PROGRAM

## 2022-01-01 PROCEDURE — 700102 HCHG RX REV CODE 250 W/ 637 OVERRIDE(OP): Performed by: HOSPITALIST

## 2022-01-01 PROCEDURE — 82746 ASSAY OF FOLIC ACID SERUM: CPT

## 2022-01-01 PROCEDURE — 80053 COMPREHEN METABOLIC PANEL: CPT

## 2022-01-01 PROCEDURE — 85046 RETICYTE/HGB CONCENTRATE: CPT

## 2022-01-01 PROCEDURE — C9113 INJ PANTOPRAZOLE SODIUM, VIA: HCPCS | Performed by: STUDENT IN AN ORGANIZED HEALTH CARE EDUCATION/TRAINING PROGRAM

## 2022-01-01 PROCEDURE — 99222 1ST HOSP IP/OBS MODERATE 55: CPT | Performed by: HOSPITALIST

## 2022-01-01 PROCEDURE — 99232 SBSQ HOSP IP/OBS MODERATE 35: CPT | Performed by: NURSE PRACTITIONER

## 2022-01-01 PROCEDURE — 85652 RBC SED RATE AUTOMATED: CPT

## 2022-01-01 PROCEDURE — 92950 HEART/LUNG RESUSCITATION CPR: CPT

## 2022-01-01 PROCEDURE — 70450 CT HEAD/BRAIN W/O DYE: CPT | Mod: MG

## 2022-01-01 PROCEDURE — 97530 THERAPEUTIC ACTIVITIES: CPT

## 2022-01-01 PROCEDURE — 85730 THROMBOPLASTIN TIME PARTIAL: CPT

## 2022-01-01 PROCEDURE — 99232 SBSQ HOSP IP/OBS MODERATE 35: CPT | Performed by: STUDENT IN AN ORGANIZED HEALTH CARE EDUCATION/TRAINING PROGRAM

## 2022-01-01 PROCEDURE — 700105 HCHG RX REV CODE 258: Performed by: EMERGENCY MEDICINE

## 2022-01-01 PROCEDURE — A9270 NON-COVERED ITEM OR SERVICE: HCPCS | Performed by: NURSE PRACTITIONER

## 2022-01-01 PROCEDURE — 700101 HCHG RX REV CODE 250: Performed by: EMERGENCY MEDICINE

## 2022-01-01 PROCEDURE — 84484 ASSAY OF TROPONIN QUANT: CPT

## 2022-01-01 PROCEDURE — 700101 HCHG RX REV CODE 250: Performed by: STUDENT IN AN ORGANIZED HEALTH CARE EDUCATION/TRAINING PROGRAM

## 2022-01-01 PROCEDURE — G0378 HOSPITAL OBSERVATION PER HR: HCPCS

## 2022-01-01 PROCEDURE — 99232 SBSQ HOSP IP/OBS MODERATE 35: CPT | Performed by: HOSPITALIST

## 2022-01-01 PROCEDURE — 99239 HOSP IP/OBS DSCHRG MGMT >30: CPT | Performed by: HOSPITALIST

## 2022-01-01 PROCEDURE — 82272 OCCULT BLD FECES 1-3 TESTS: CPT

## 2022-01-01 PROCEDURE — 97162 PT EVAL MOD COMPLEX 30 MIN: CPT

## 2022-01-01 PROCEDURE — 92526 ORAL FUNCTION THERAPY: CPT

## 2022-01-01 PROCEDURE — 99291 CRITICAL CARE FIRST HOUR: CPT | Performed by: NURSE PRACTITIONER

## 2022-01-01 PROCEDURE — 86850 RBC ANTIBODY SCREEN: CPT

## 2022-01-01 PROCEDURE — 4410270 IR-THROMBO MECHANICAL ARTERY,INIT

## 2022-01-01 PROCEDURE — 99223 1ST HOSP IP/OBS HIGH 75: CPT | Performed by: PHYSICAL MEDICINE & REHABILITATION

## 2022-01-01 PROCEDURE — 93306 TTE W/DOPPLER COMPLETE: CPT | Mod: 26 | Performed by: INTERNAL MEDICINE

## 2022-01-01 PROCEDURE — 84443 ASSAY THYROID STIM HORMONE: CPT

## 2022-01-01 PROCEDURE — 82607 VITAMIN B-12: CPT

## 2022-01-01 PROCEDURE — 93248 EXT ECG>7D<15D REV&INTERPJ: CPT | Performed by: INTERNAL MEDICINE

## 2022-01-01 PROCEDURE — 70496 CT ANGIOGRAPHY HEAD: CPT | Mod: MG

## 2022-01-01 PROCEDURE — 83690 ASSAY OF LIPASE: CPT

## 2022-01-01 PROCEDURE — 700101 HCHG RX REV CODE 250: Performed by: NURSE PRACTITIONER

## 2022-01-01 PROCEDURE — 85027 COMPLETE CBC AUTOMATED: CPT

## 2022-01-01 PROCEDURE — 96375 TX/PRO/DX INJ NEW DRUG ADDON: CPT | Mod: XU

## 2022-01-01 PROCEDURE — 80076 HEPATIC FUNCTION PANEL: CPT

## 2022-01-01 PROCEDURE — 94760 N-INVAS EAR/PLS OXIMETRY 1: CPT

## 2022-01-01 PROCEDURE — 92610 EVALUATE SWALLOWING FUNCTION: CPT

## 2022-01-01 PROCEDURE — 97166 OT EVAL MOD COMPLEX 45 MIN: CPT

## 2022-01-01 PROCEDURE — 87186 SC STD MICRODIL/AGAR DIL: CPT

## 2022-01-01 PROCEDURE — 99291 CRITICAL CARE FIRST HOUR: CPT

## 2022-01-01 PROCEDURE — 87077 CULTURE AEROBIC IDENTIFY: CPT

## 2022-01-01 PROCEDURE — 92523 SPEECH SOUND LANG COMPREHEN: CPT

## 2022-01-01 PROCEDURE — 84100 ASSAY OF PHOSPHORUS: CPT

## 2022-01-01 PROCEDURE — 99233 SBSQ HOSP IP/OBS HIGH 50: CPT | Performed by: NURSE PRACTITIONER

## 2022-01-01 PROCEDURE — 85610 PROTHROMBIN TIME: CPT

## 2022-01-01 PROCEDURE — 74177 CT ABD & PELVIS W/CONTRAST: CPT | Mod: MG

## 2022-01-01 PROCEDURE — 99233 SBSQ HOSP IP/OBS HIGH 50: CPT | Performed by: HOSPITALIST

## 2022-01-01 PROCEDURE — 99291 CRITICAL CARE FIRST HOUR: CPT | Mod: GC | Performed by: INTERNAL MEDICINE

## 2022-01-01 PROCEDURE — 770022 HCHG ROOM/CARE - ICU (200)

## 2022-01-01 PROCEDURE — 302214 INTUBATION BOX: Performed by: EMERGENCY MEDICINE

## 2022-01-01 PROCEDURE — 87086 URINE CULTURE/COLONY COUNT: CPT

## 2022-01-01 PROCEDURE — 31500 INSERT EMERGENCY AIRWAY: CPT

## 2022-01-01 PROCEDURE — 700101 HCHG RX REV CODE 250: Performed by: HOSPITALIST

## 2022-01-01 PROCEDURE — 0042T CT-CEREBRAL PERFUSION ANALYSIS: CPT

## 2022-01-01 PROCEDURE — 81001 URINALYSIS AUTO W/SCOPE: CPT

## 2022-01-01 PROCEDURE — 71275 CT ANGIOGRAPHY CHEST: CPT | Mod: ME

## 2022-01-01 PROCEDURE — 82077 ASSAY SPEC XCP UR&BREATH IA: CPT

## 2022-01-01 PROCEDURE — 71260 CT THORAX DX C+: CPT | Mod: MG

## 2022-01-01 PROCEDURE — 86901 BLOOD TYPING SEROLOGIC RH(D): CPT

## 2022-01-01 PROCEDURE — 36556 INSERT NON-TUNNEL CV CATH: CPT

## 2022-01-01 PROCEDURE — 93010 ELECTROCARDIOGRAM REPORT: CPT | Performed by: INTERNAL MEDICINE

## 2022-01-01 PROCEDURE — 70498 CT ANGIOGRAPHY NECK: CPT | Mod: MG

## 2022-01-01 PROCEDURE — 93005 ELECTROCARDIOGRAM TRACING: CPT | Performed by: EMERGENCY MEDICINE

## 2022-01-01 PROCEDURE — C1751 CATH, INF, PER/CENT/MIDLINE: HCPCS

## 2022-01-01 PROCEDURE — 97535 SELF CARE MNGMENT TRAINING: CPT | Mod: CO

## 2022-01-01 PROCEDURE — 85055 RETICULATED PLATELET ASSAY: CPT

## 2022-01-01 PROCEDURE — 83605 ASSAY OF LACTIC ACID: CPT

## 2022-01-01 PROCEDURE — 87040 BLOOD CULTURE FOR BACTERIA: CPT

## 2022-01-01 RX ORDER — ROCURONIUM BROMIDE 10 MG/ML
80 INJECTION, SOLUTION INTRAVENOUS ONCE
Status: COMPLETED | OUTPATIENT
Start: 2022-01-01 | End: 2022-01-01

## 2022-01-01 RX ORDER — LORAZEPAM 2 MG/ML
1 INJECTION INTRAMUSCULAR
Status: DISCONTINUED | OUTPATIENT
Start: 2022-01-01 | End: 2022-01-01 | Stop reason: HOSPADM

## 2022-01-01 RX ORDER — AMOXICILLIN 250 MG
2 CAPSULE ORAL 2 TIMES DAILY
Status: DISCONTINUED | OUTPATIENT
Start: 2022-01-01 | End: 2022-01-01 | Stop reason: HOSPADM

## 2022-01-01 RX ORDER — METOPROLOL TARTRATE 1 MG/ML
5 INJECTION, SOLUTION INTRAVENOUS
Status: DISCONTINUED | OUTPATIENT
Start: 2022-01-01 | End: 2022-01-01 | Stop reason: HOSPADM

## 2022-01-01 RX ORDER — ATROPINE SULFATE 10 MG/ML
2 SOLUTION/ DROPS OPHTHALMIC EVERY 4 HOURS PRN
Status: DISCONTINUED | OUTPATIENT
Start: 2022-01-01 | End: 2022-01-01 | Stop reason: HOSPADM

## 2022-01-01 RX ORDER — BISACODYL 10 MG
10 SUPPOSITORY, RECTAL RECTAL
Status: DISCONTINUED | OUTPATIENT
Start: 2022-01-01 | End: 2022-01-01 | Stop reason: HOSPADM

## 2022-01-01 RX ORDER — ONDANSETRON 2 MG/ML
4 INJECTION INTRAMUSCULAR; INTRAVENOUS EVERY 4 HOURS PRN
Status: DISCONTINUED | OUTPATIENT
Start: 2022-01-01 | End: 2022-01-01 | Stop reason: HOSPADM

## 2022-01-01 RX ORDER — MORPHINE SULFATE 4 MG/ML
1 INJECTION INTRAVENOUS EVERY 4 HOURS PRN
Status: DISCONTINUED | OUTPATIENT
Start: 2022-01-01 | End: 2022-01-01 | Stop reason: HOSPADM

## 2022-01-01 RX ORDER — LABETALOL HYDROCHLORIDE 5 MG/ML
10 INJECTION, SOLUTION INTRAVENOUS
Status: DISCONTINUED | OUTPATIENT
Start: 2022-01-01 | End: 2022-01-01 | Stop reason: HOSPADM

## 2022-01-01 RX ORDER — LORAZEPAM 2 MG/ML
1 CONCENTRATE ORAL
Status: DISCONTINUED | OUTPATIENT
Start: 2022-01-01 | End: 2022-01-01 | Stop reason: HOSPADM

## 2022-01-01 RX ORDER — ATORVASTATIN CALCIUM 20 MG/1
40 TABLET, FILM COATED ORAL EVERY EVENING
Status: DISCONTINUED | OUTPATIENT
Start: 2022-01-01 | End: 2022-01-01

## 2022-01-01 RX ORDER — PANTOPRAZOLE SODIUM 40 MG/10ML
40 INJECTION, POWDER, LYOPHILIZED, FOR SOLUTION INTRAVENOUS DAILY
Status: DISCONTINUED | OUTPATIENT
Start: 2022-01-01 | End: 2022-01-01

## 2022-01-01 RX ORDER — ACETAMINOPHEN 325 MG/1
650 TABLET ORAL EVERY 6 HOURS PRN
Status: DISCONTINUED | OUTPATIENT
Start: 2022-01-01 | End: 2022-01-01 | Stop reason: HOSPADM

## 2022-01-01 RX ORDER — SODIUM CHLORIDE, SODIUM LACTATE, POTASSIUM CHLORIDE, CALCIUM CHLORIDE 600; 310; 30; 20 MG/100ML; MG/100ML; MG/100ML; MG/100ML
1000 INJECTION, SOLUTION INTRAVENOUS ONCE
Status: COMPLETED | OUTPATIENT
Start: 2022-01-01 | End: 2022-01-01

## 2022-01-01 RX ORDER — ATORVASTATIN CALCIUM 80 MG/1
80 TABLET, FILM COATED ORAL EVERY EVENING
Status: DISCONTINUED | OUTPATIENT
Start: 2022-01-01 | End: 2022-01-01 | Stop reason: HOSPADM

## 2022-01-01 RX ORDER — CEFTRIAXONE 2 G/1
2 INJECTION, POWDER, FOR SOLUTION INTRAMUSCULAR; INTRAVENOUS ONCE
Status: COMPLETED | OUTPATIENT
Start: 2022-01-01 | End: 2022-01-01

## 2022-01-01 RX ORDER — LABETALOL HYDROCHLORIDE 5 MG/ML
10 INJECTION, SOLUTION INTRAVENOUS EVERY 4 HOURS PRN
Status: DISCONTINUED | OUTPATIENT
Start: 2022-01-01 | End: 2022-01-01

## 2022-01-01 RX ORDER — ONDANSETRON 2 MG/ML
INJECTION INTRAMUSCULAR; INTRAVENOUS
Status: ACTIVE
Start: 2022-01-01 | End: 2022-01-01

## 2022-01-01 RX ORDER — MORPHINE SULFATE 100 MG/5ML
10 SOLUTION ORAL
Status: DISCONTINUED | OUTPATIENT
Start: 2022-01-01 | End: 2022-01-01 | Stop reason: HOSPADM

## 2022-01-01 RX ORDER — ETOMIDATE 2 MG/ML
20 INJECTION INTRAVENOUS ONCE
Status: COMPLETED | OUTPATIENT
Start: 2022-01-01 | End: 2022-01-01

## 2022-01-01 RX ORDER — OMEPRAZOLE 20 MG/1
20 CAPSULE, DELAYED RELEASE ORAL 2 TIMES DAILY
Status: SHIPPED
Start: 2022-01-01

## 2022-01-01 RX ORDER — HYDROMORPHONE HYDROCHLORIDE 2 MG/ML
4 INJECTION, SOLUTION INTRAMUSCULAR; INTRAVENOUS; SUBCUTANEOUS
Status: DISCONTINUED | OUTPATIENT
Start: 2022-01-01 | End: 2022-01-01 | Stop reason: HOSPADM

## 2022-01-01 RX ORDER — HYDRALAZINE HYDROCHLORIDE 20 MG/ML
10 INJECTION INTRAMUSCULAR; INTRAVENOUS EVERY 6 HOURS PRN
Status: DISCONTINUED | OUTPATIENT
Start: 2022-01-01 | End: 2022-01-01 | Stop reason: HOSPADM

## 2022-01-01 RX ORDER — MIDODRINE HYDROCHLORIDE 5 MG/1
5 TABLET ORAL
Status: DISCONTINUED | OUTPATIENT
Start: 2022-01-01 | End: 2022-01-01

## 2022-01-01 RX ORDER — ATORVASTATIN CALCIUM 80 MG/1
80 TABLET, FILM COATED ORAL EVERY EVENING
Qty: 30 TABLET | Status: SHIPPED
Start: 2022-01-01

## 2022-01-01 RX ORDER — POLYETHYLENE GLYCOL 3350 17 G/17G
1 POWDER, FOR SOLUTION ORAL
Status: DISCONTINUED | OUTPATIENT
Start: 2022-01-01 | End: 2022-01-01 | Stop reason: HOSPADM

## 2022-01-01 RX ORDER — DEXTROSE MONOHYDRATE 25 G/50ML
25 INJECTION, SOLUTION INTRAVENOUS ONCE
Status: COMPLETED | OUTPATIENT
Start: 2022-01-01 | End: 2022-01-01

## 2022-01-01 RX ORDER — SODIUM CHLORIDE, SODIUM LACTATE, POTASSIUM CHLORIDE, AND CALCIUM CHLORIDE .6; .31; .03; .02 G/100ML; G/100ML; G/100ML; G/100ML
1000 INJECTION, SOLUTION INTRAVENOUS ONCE
Status: COMPLETED | OUTPATIENT
Start: 2022-01-01 | End: 2022-01-01

## 2022-01-01 RX ADMIN — CEFTRIAXONE SODIUM 2 G: 2 INJECTION, POWDER, FOR SOLUTION INTRAMUSCULAR; INTRAVENOUS at 20:45

## 2022-01-01 RX ADMIN — IOHEXOL 100 ML: 350 INJECTION, SOLUTION INTRAVENOUS at 17:46

## 2022-01-01 RX ADMIN — METOPROLOL TARTRATE 25 MG: 25 TABLET, FILM COATED ORAL at 05:42

## 2022-01-01 RX ADMIN — SENNOSIDES AND DOCUSATE SODIUM 2 TABLET: 50; 8.6 TABLET ORAL at 05:36

## 2022-01-01 RX ADMIN — FENTANYL CITRATE 50 MCG: 50 INJECTION, SOLUTION INTRAMUSCULAR; INTRAVENOUS at 16:49

## 2022-01-01 RX ADMIN — OMEPRAZOLE 40 MG: KIT at 06:16

## 2022-01-01 RX ADMIN — APIXABAN 5 MG: 5 TABLET, FILM COATED ORAL at 17:31

## 2022-01-01 RX ADMIN — METOPROLOL TARTRATE 25 MG: 25 TABLET, FILM COATED ORAL at 17:20

## 2022-01-01 RX ADMIN — METOPROLOL TARTRATE 25 MG: 25 TABLET, FILM COATED ORAL at 04:44

## 2022-01-01 RX ADMIN — CEFTRIAXONE SODIUM 1 G: 10 INJECTION, POWDER, FOR SOLUTION INTRAVENOUS at 04:44

## 2022-01-01 RX ADMIN — ACETAMINOPHEN 650 MG: 325 TABLET, FILM COATED ORAL at 14:42

## 2022-01-01 RX ADMIN — LABETALOL HYDROCHLORIDE 10 MG: 5 INJECTION INTRAVENOUS at 21:10

## 2022-01-01 RX ADMIN — MORPHINE SULFATE 1 MG: 4 INJECTION INTRAVENOUS at 04:41

## 2022-01-01 RX ADMIN — MORPHINE SULFATE 1 MG: 4 INJECTION INTRAVENOUS at 20:47

## 2022-01-01 RX ADMIN — IOHEXOL 80 ML: 350 INJECTION, SOLUTION INTRAVENOUS at 15:33

## 2022-01-01 RX ADMIN — APIXABAN 5 MG: 5 TABLET, FILM COATED ORAL at 05:44

## 2022-01-01 RX ADMIN — IOHEXOL 100 ML: 350 INJECTION, SOLUTION INTRAVENOUS at 20:51

## 2022-01-01 RX ADMIN — LABETALOL HYDROCHLORIDE 10 MG: 5 INJECTION INTRAVENOUS at 15:45

## 2022-01-01 RX ADMIN — ATORVASTATIN CALCIUM 80 MG: 80 TABLET, FILM COATED ORAL at 17:09

## 2022-01-01 RX ADMIN — SENNOSIDES AND DOCUSATE SODIUM 2 TABLET: 50; 8.6 TABLET ORAL at 17:09

## 2022-01-01 RX ADMIN — METOPROLOL TARTRATE 25 MG: 25 TABLET, FILM COATED ORAL at 17:31

## 2022-01-01 RX ADMIN — ACETAMINOPHEN 650 MG: 325 TABLET, FILM COATED ORAL at 06:21

## 2022-01-01 RX ADMIN — ATORVASTATIN CALCIUM 80 MG: 80 TABLET, FILM COATED ORAL at 17:17

## 2022-01-01 RX ADMIN — MORPHINE SULFATE 1 MG: 4 INJECTION INTRAVENOUS at 08:26

## 2022-01-01 RX ADMIN — SENNOSIDES AND DOCUSATE SODIUM 2 TABLET: 50; 8.6 TABLET ORAL at 18:34

## 2022-01-01 RX ADMIN — METOPROLOL TARTRATE 25 MG: 25 TABLET, FILM COATED ORAL at 17:09

## 2022-01-01 RX ADMIN — DEXTROSE MONOHYDRATE 25 G: 70 INJECTION, SOLUTION INTRAVENOUS at 21:10

## 2022-01-01 RX ADMIN — HYDROMORPHONE HYDROCHLORIDE 4 MG: 2 INJECTION INTRAMUSCULAR; INTRAVENOUS; SUBCUTANEOUS at 23:58

## 2022-01-01 RX ADMIN — ATORVASTATIN CALCIUM 80 MG: 80 TABLET, FILM COATED ORAL at 17:31

## 2022-01-01 RX ADMIN — ACETAMINOPHEN 650 MG: 325 TABLET, FILM COATED ORAL at 04:07

## 2022-01-01 RX ADMIN — ROCURONIUM BROMIDE 80 MG: 10 INJECTION, SOLUTION INTRAVENOUS at 20:00

## 2022-01-01 RX ADMIN — SENNOSIDES AND DOCUSATE SODIUM 2 TABLET: 50; 8.6 TABLET ORAL at 04:44

## 2022-01-01 RX ADMIN — METOPROLOL TARTRATE 25 MG: 25 TABLET, FILM COATED ORAL at 05:23

## 2022-01-01 RX ADMIN — LABETALOL HYDROCHLORIDE 10 MG: 5 INJECTION INTRAVENOUS at 00:12

## 2022-01-01 RX ADMIN — SENNOSIDES AND DOCUSATE SODIUM 2 TABLET: 50; 8.6 TABLET ORAL at 05:45

## 2022-01-01 RX ADMIN — ATORVASTATIN CALCIUM 80 MG: 80 TABLET, FILM COATED ORAL at 18:34

## 2022-01-01 RX ADMIN — ATORVASTATIN CALCIUM 80 MG: 80 TABLET, FILM COATED ORAL at 17:18

## 2022-01-01 RX ADMIN — OMEPRAZOLE 40 MG: KIT at 05:36

## 2022-01-01 RX ADMIN — CEFTRIAXONE SODIUM 1 G: 10 INJECTION, POWDER, FOR SOLUTION INTRAVENOUS at 05:44

## 2022-01-01 RX ADMIN — SENNOSIDES AND DOCUSATE SODIUM 2 TABLET: 50; 8.6 TABLET ORAL at 18:24

## 2022-01-01 RX ADMIN — METOPROLOL TARTRATE 25 MG: 25 TABLET, FILM COATED ORAL at 14:06

## 2022-01-01 RX ADMIN — LABETALOL HYDROCHLORIDE 10 MG: 5 INJECTION INTRAVENOUS at 14:25

## 2022-01-01 RX ADMIN — SENNOSIDES AND DOCUSATE SODIUM 2 TABLET: 50; 8.6 TABLET ORAL at 17:17

## 2022-01-01 RX ADMIN — SODIUM CHLORIDE, POTASSIUM CHLORIDE, SODIUM LACTATE AND CALCIUM CHLORIDE 1000 ML: 600; 310; 30; 20 INJECTION, SOLUTION INTRAVENOUS at 22:40

## 2022-01-01 RX ADMIN — ETOMIDATE 20 MG: 2 INJECTION INTRAVENOUS at 19:59

## 2022-01-01 RX ADMIN — SODIUM CHLORIDE, POTASSIUM CHLORIDE, SODIUM LACTATE AND CALCIUM CHLORIDE 1000 ML: 600; 310; 30; 20 INJECTION, SOLUTION INTRAVENOUS at 21:30

## 2022-01-01 RX ADMIN — ACETAMINOPHEN 650 MG: 325 TABLET, FILM COATED ORAL at 12:14

## 2022-01-01 RX ADMIN — ONDANSETRON 4 MG: 2 INJECTION INTRAMUSCULAR; INTRAVENOUS at 03:14

## 2022-01-01 RX ADMIN — METOPROLOL TARTRATE 25 MG: 25 TABLET, FILM COATED ORAL at 06:16

## 2022-01-01 RX ADMIN — METOPROLOL TARTRATE 25 MG: 25 TABLET, FILM COATED ORAL at 05:40

## 2022-01-01 RX ADMIN — SENNOSIDES AND DOCUSATE SODIUM 2 TABLET: 50; 8.6 TABLET ORAL at 04:58

## 2022-01-01 RX ADMIN — METOPROLOL TARTRATE 25 MG: 25 TABLET, FILM COATED ORAL at 17:17

## 2022-01-01 RX ADMIN — METOPROLOL TARTRATE 25 MG: 25 TABLET, FILM COATED ORAL at 17:18

## 2022-01-01 RX ADMIN — OMEPRAZOLE 40 MG: KIT at 04:58

## 2022-01-01 RX ADMIN — LABETALOL HYDROCHLORIDE 10 MG: 5 INJECTION INTRAVENOUS at 16:33

## 2022-01-01 RX ADMIN — CEFTRIAXONE SODIUM 1 G: 10 INJECTION, POWDER, FOR SOLUTION INTRAVENOUS at 09:17

## 2022-01-01 RX ADMIN — SENNOSIDES AND DOCUSATE SODIUM 2 TABLET: 50; 8.6 TABLET ORAL at 05:40

## 2022-01-01 RX ADMIN — IOHEXOL 40 ML: 350 INJECTION, SOLUTION INTRAVENOUS at 15:36

## 2022-01-01 RX ADMIN — OMEPRAZOLE 40 MG: KIT at 05:45

## 2022-01-01 RX ADMIN — METOPROLOL TARTRATE 25 MG: 25 TABLET, FILM COATED ORAL at 05:44

## 2022-01-01 RX ADMIN — METOPROLOL TARTRATE 25 MG: 25 TABLET, FILM COATED ORAL at 18:24

## 2022-01-01 RX ADMIN — METOPROLOL TARTRATE 25 MG: 25 TABLET, FILM COATED ORAL at 05:36

## 2022-01-01 RX ADMIN — SENNOSIDES AND DOCUSATE SODIUM 2 TABLET: 50; 8.6 TABLET ORAL at 06:16

## 2022-01-01 RX ADMIN — ACETAMINOPHEN 650 MG: 325 TABLET, FILM COATED ORAL at 18:34

## 2022-01-01 RX ADMIN — PANTOPRAZOLE SODIUM 40 MG: 40 INJECTION, POWDER, LYOPHILIZED, FOR SOLUTION INTRAVENOUS at 05:39

## 2022-01-01 RX ADMIN — OMEPRAZOLE 40 MG: KIT at 05:23

## 2022-01-01 RX ADMIN — IOHEXOL 60 ML: 300 INJECTION, SOLUTION INTRAVENOUS at 17:30

## 2022-01-01 RX ADMIN — MORPHINE SULFATE 1 MG: 4 INJECTION INTRAVENOUS at 21:36

## 2022-01-01 RX ADMIN — CEFTRIAXONE SODIUM 1 G: 10 INJECTION, POWDER, FOR SOLUTION INTRAVENOUS at 05:40

## 2022-01-01 RX ADMIN — ATORVASTATIN CALCIUM 80 MG: 80 TABLET, FILM COATED ORAL at 18:24

## 2022-01-01 RX ADMIN — SENNOSIDES AND DOCUSATE SODIUM 2 TABLET: 50; 8.6 TABLET ORAL at 17:31

## 2022-01-01 RX ADMIN — LABETALOL HYDROCHLORIDE 10 MG: 5 INJECTION INTRAVENOUS at 01:36

## 2022-01-01 RX ADMIN — ATORVASTATIN CALCIUM 80 MG: 80 TABLET, FILM COATED ORAL at 17:07

## 2022-01-01 RX ADMIN — ATORVASTATIN CALCIUM 80 MG: 80 TABLET, FILM COATED ORAL at 17:20

## 2022-01-01 RX ADMIN — METOPROLOL TARTRATE 25 MG: 25 TABLET, FILM COATED ORAL at 04:58

## 2022-01-01 RX ADMIN — METOPROLOL TARTRATE 25 MG: 25 TABLET, FILM COATED ORAL at 17:07

## 2022-01-01 RX ADMIN — OMEPRAZOLE 40 MG: KIT at 05:42

## 2022-01-01 RX ADMIN — ATROPINE SULFATE 0.5 MG: 0.1 INJECTION, SOLUTION INTRAVENOUS at 19:55

## 2022-01-01 RX ADMIN — OMEPRAZOLE 40 MG: KIT at 05:41

## 2022-01-01 RX ADMIN — OMEPRAZOLE 40 MG: KIT at 04:44

## 2022-01-01 RX ADMIN — ONDANSETRON 4 MG: 2 INJECTION INTRAMUSCULAR; INTRAVENOUS at 07:10

## 2022-01-01 RX ADMIN — ACETAMINOPHEN 650 MG: 325 TABLET, FILM COATED ORAL at 20:53

## 2022-01-01 RX ADMIN — ACETAMINOPHEN 650 MG: 325 TABLET, FILM COATED ORAL at 11:28

## 2022-01-01 RX ADMIN — SENNOSIDES AND DOCUSATE SODIUM 2 TABLET: 50; 8.6 TABLET ORAL at 05:23

## 2022-01-01 ASSESSMENT — PAIN DESCRIPTION - PAIN TYPE
TYPE: ACUTE PAIN

## 2022-01-01 ASSESSMENT — ENCOUNTER SYMPTOMS
MYALGIAS: 0
SHORTNESS OF BREATH: 0
CHILLS: 0
SHORTNESS OF BREATH: 0
DIZZINESS: 0
ABDOMINAL PAIN: 0
FEVER: 0
NAUSEA: 0
COUGH: 0
CHILLS: 0
NAUSEA: 0
FEVER: 0
ABDOMINAL PAIN: 0
FEVER: 0
SEIZURES: 0
CHILLS: 0
FOCAL WEAKNESS: 1
VOMITING: 0
MYALGIAS: 1
CHILLS: 0
FOCAL WEAKNESS: 1
HEARTBURN: 0
BACK PAIN: 0
COUGH: 0
FOCAL WEAKNESS: 1
ABDOMINAL PAIN: 0
COUGH: 0
ABDOMINAL PAIN: 0
HEADACHES: 1
TINGLING: 0
LOSS OF CONSCIOUSNESS: 0
VOMITING: 0
FEVER: 0
SENSORY CHANGE: 0
WEAKNESS: 1
BLURRED VISION: 0
NAUSEA: 0
WEAKNESS: 1
SHORTNESS OF BREATH: 0
HEADACHES: 1
NAUSEA: 0
PALPITATIONS: 0
SPEECH CHANGE: 1
NECK PAIN: 0
EYES NEGATIVE: 1
MYALGIAS: 0
FEVER: 0
HEMOPTYSIS: 0
COUGH: 0
VOMITING: 0
BRUISES/BLEEDS EASILY: 0
SHORTNESS OF BREATH: 0

## 2022-01-01 ASSESSMENT — COGNITIVE AND FUNCTIONAL STATUS - GENERAL
WALKING IN HOSPITAL ROOM: A LOT
DRESSING REGULAR LOWER BODY CLOTHING: A LOT
MOVING FROM LYING ON BACK TO SITTING ON SIDE OF FLAT BED: UNABLE
MOVING FROM LYING ON BACK TO SITTING ON SIDE OF FLAT BED: UNABLE
TURNING FROM BACK TO SIDE WHILE IN FLAT BAD: UNABLE
DRESSING REGULAR UPPER BODY CLOTHING: A LOT
HELP NEEDED FOR BATHING: A LOT
DRESSING REGULAR UPPER BODY CLOTHING: A LOT
TURNING FROM BACK TO SIDE WHILE IN FLAT BAD: UNABLE
SUGGESTED CMS G CODE MODIFIER MOBILITY: CM
SUGGESTED CMS G CODE MODIFIER DAILY ACTIVITY: CK
MOVING TO AND FROM BED TO CHAIR: UNABLE
MOVING FROM LYING ON BACK TO SITTING ON SIDE OF FLAT BED: UNABLE
WALKING IN HOSPITAL ROOM: A LOT
SUGGESTED CMS G CODE MODIFIER DAILY ACTIVITY: CL
DAILY ACTIVITIY SCORE: 14
STANDING UP FROM CHAIR USING ARMS: A LOT
STANDING UP FROM CHAIR USING ARMS: A LOT
PERSONAL GROOMING: A LITTLE
EATING MEALS: A LITTLE
CLIMB 3 TO 5 STEPS WITH RAILING: TOTAL
PERSONAL GROOMING: A LITTLE
MOBILITY SCORE: 8
EATING MEALS: A LITTLE
DAILY ACTIVITIY SCORE: 14
TOILETING: A LOT
DAILY ACTIVITIY SCORE: 13
DRESSING REGULAR LOWER BODY CLOTHING: A LOT
EATING MEALS: A LITTLE
HELP NEEDED FOR BATHING: A LOT
WALKING IN HOSPITAL ROOM: A LOT
MOVING TO AND FROM BED TO CHAIR: UNABLE
STANDING UP FROM CHAIR USING ARMS: A LOT
DRESSING REGULAR UPPER BODY CLOTHING: A LOT
PERSONAL GROOMING: A LOT
TOILETING: A LOT
TURNING FROM BACK TO SIDE WHILE IN FLAT BAD: UNABLE
SUGGESTED CMS G CODE MODIFIER DAILY ACTIVITY: CK
HELP NEEDED FOR BATHING: A LOT
CLIMB 3 TO 5 STEPS WITH RAILING: TOTAL
CLIMB 3 TO 5 STEPS WITH RAILING: TOTAL
MOVING TO AND FROM BED TO CHAIR: UNABLE
TOILETING: A LOT
SUGGESTED CMS G CODE MODIFIER MOBILITY: CM
SUGGESTED CMS G CODE MODIFIER MOBILITY: CM
DRESSING REGULAR LOWER BODY CLOTHING: A LOT
MOBILITY SCORE: 8
MOBILITY SCORE: 8

## 2022-01-01 ASSESSMENT — FIBROSIS 4 INDEX
FIB4 SCORE: 3.94
FIB4 SCORE: 26.46
FIB4 SCORE: 3.3
FIB4 SCORE: 3.49
FIB4 SCORE: 3.34

## 2022-01-01 ASSESSMENT — GAIT ASSESSMENTS
GAIT LEVEL OF ASSIST: UNABLE TO PARTICIPATE

## 2022-01-01 ASSESSMENT — PATIENT HEALTH QUESTIONNAIRE - PHQ9
2. FEELING DOWN, DEPRESSED, IRRITABLE, OR HOPELESS: NOT AT ALL
1. LITTLE INTEREST OR PLEASURE IN DOING THINGS: NOT AT ALL
SUM OF ALL RESPONSES TO PHQ9 QUESTIONS 1 AND 2: 0

## 2022-01-01 ASSESSMENT — ACTIVITIES OF DAILY LIVING (ADL): TOILETING: INDEPENDENT

## 2022-01-01 ASSESSMENT — COPD QUESTIONNAIRES
DO YOU EVER COUGH UP ANY MUCUS OR PHLEGM?: NO/ONLY WITH OCCASIONAL COLDS OR INFECTIONS
DURING THE PAST 4 WEEKS HOW MUCH DID YOU FEEL SHORT OF BREATH: NONE/LITTLE OF THE TIME
COPD SCREENING SCORE: 2
HAVE YOU SMOKED AT LEAST 100 CIGARETTES IN YOUR ENTIRE LIFE: NO/DON'T KNOW

## 2022-03-14 PROBLEM — K21.9 GERD (GASTROESOPHAGEAL REFLUX DISEASE): Status: ACTIVE | Noted: 2022-01-01

## 2022-03-14 PROBLEM — I63.9 ACUTE ISCHEMIC STROKE (HCC): Status: RESOLVED | Noted: 2022-01-01 | Resolved: 2022-01-01

## 2022-03-14 PROBLEM — I47.10 PAROXYSMAL SVT (SUPRAVENTRICULAR TACHYCARDIA) (HCC): Status: ACTIVE | Noted: 2022-01-01

## 2022-03-14 PROBLEM — D64.9 ANEMIA: Status: ACTIVE | Noted: 2022-01-01

## 2022-03-14 PROBLEM — I63.9 ACUTE ISCHEMIC STROKE (HCC): Status: ACTIVE | Noted: 2022-01-01

## 2022-03-14 NOTE — CONSULTS
Chief Complaint   Patient presents with   • Unilateral Weakness     Left sided   • Facial Droop       Problem List Items Addressed This Visit    None     Visit Diagnoses     Acute embolic stroke (HCC)        Relevant Medications    labetalol (NORMODYNE/TRANDATE) injection 10 mg    niCARdipine (CARDENE) 25 mg in  mL Infusion    apixaban (ELIQUIS) 5mg Tab        Neurology Stroke Alert Consultation     History of present illness:  This is a 71-year old female with PMhx significant for DVT (lower extremity; currently on reported 7-day course of Eliquis; unclear compliance), also with morbid obesity, recurrent UTIs, further details are limited who presented [facility] for a chief complaint of Left sided weakness. The patient was reportedly in her usual state of health this afternoon; at 1350, the patient's  heard the patient drop her iPad; he went to help the patient, and found her with Left UE/LE flaccidity. EMS called; on scene SBP 110s; . Stroke Pre Alert called at 1459. On arrival here at 1514, patient notably with Right MCA syndrome, NIHSS 15. Stat CT head revealed no acute intracranial abnormality; CTA head/neck with Right MCA M1 thrombus. CT perfusion study with large (121 mL) mismatch/penumnbra with no core. Given patient's concurrent use of Eliquis and indeterminate compliance/unknown dose, the decision was made to forgo IV thrombolytics and pursue only IR thrombectomy.      LKW 1350  Door time 1514  To IR at 1548    Currently patient is sitting up in stretcher; awake and alert. Speech is dysarthric, however coherent. Right gaze present, unable to cross midline. Left pasquale-neglect also present. Further ROS is limited secondary to patient's critical condition. NIHSS remains at 15.     Neurology has been consulted by Dr. Luis Rosado to further evaluate the findings noted above.     Past medical history:   No past medical history on file.    Past surgical history:   No  past surgical history on file.    Family history:   No family history on file.    Social history:   Social History     Socioeconomic History   • Marital status:      Spouse name: Not on file   • Number of children: Not on file   • Years of education: Not on file   • Highest education level: Not on file   Occupational History   • Not on file   Tobacco Use   • Smoking status: Never Smoker   • Smokeless tobacco: Never Used   Substance and Sexual Activity   • Alcohol use: Not on file   • Drug use: Not on file   • Sexual activity: Not on file   Other Topics Concern   • Not on file   Social History Narrative   • Not on file     Social Determinants of Health     Financial Resource Strain: Not on file   Food Insecurity: Not on file   Transportation Needs: Not on file   Physical Activity: Not on file   Stress: Not on file   Social Connections: Not on file   Intimate Partner Violence: Not on file   Housing Stability: Not on file       Current medications:   Current Facility-Administered Medications   Medication Dose   • labetalol (NORMODYNE/TRANDATE) injection 10 mg  10 mg   • niCARdipine (CARDENE) 25 mg in  mL Infusion  0-15 mg/hr     Current Outpatient Medications   Medication   • apixaban (ELIQUIS) 5mg Tab   • cephALEXin (KEFLEX) 500 MG Cap   • metoprolol (LOPRESSOR) 25 MG Tab   • omeprazole (PRILOSEC) 20 MG delayed-release capsule       Medication Allergy:  Allergies   Allergen Reactions   • Sulfa Drugs      itchy mouth and eyes       Review of systems:   Constitutional: denies fever, night sweats, weight loss.   Eyes: denies acute vision change, eye pain or secretion.   Ears, Nose, Mouth, Throat: denies nasal secretion, nasal bleeding, difficulty swallowing, hearing loss, tinnitus, vertigo, ear pain, acute dental problems, oral ulcers or lesions.   Endocrine: denies recent weight changes, heat or cold intolerance, polyuria, polydypsia, polyphagia,abnormal hair growth.  Cardiovascular: denies new onset of  chest pain, palpitations, syncope, or dyspnea of exertion.  Pulmonary: denies shortness of breath, new onset of cough, hemoptysis, wheezing, chest pain or flu-like symptoms.   GI: denies nausea, vomiting, diarrhea, GI bleeding, change in appetite, abdominal pain, and change in bowel habits.  : denies dysuria, urinary incontinence, hematuria.  Heme/oncology: denies history of easy bruising or bleeding. No history of cancer, DVTor PE.  Allergy/immunology: denies hives/urticaria, or itching.   Dermatologic: denies new rash, or new skin lesions.  Musculoskeletal:denies joint swelling or pain, muscle pain, neck and back pain.   Neurologic: As noted in detail above.   Psychiatric: denies symptoms of depression, anxiety, hallucinations, mood swings or changes, suicidal or homicidal thoughts.     Physical examination:   Vitals:    03/14/22 1541 03/14/22 1542 03/14/22 1543 03/14/22 1548   BP: (!) 210/88 (!) 185/78 (!) 185/78 156/65   Pulse: (!) 104 (!) 105 (!) 102 100   Resp: 20 20 20 20   Temp:  37.2 °C (98.9 °F)     TempSrc:  Temporal     SpO2: 98% 98% 98%    Weight:  113 kg (250 lb 1.6 oz)     Height:         General: Patient in no acute distress, cooperative.  HEENT: Normocephalic, no signs of acute trauma.   Neck: supple, no meningeal signs or carotid bruits. There is normal range of motion. No tenderness on exam.   Chest: clear to auscultation. No cough.   CV: RRR, no murmurs.   Skin: no signs of acute rashes or trauma.   Musculoskeletal: joints exhibit full range of motion, without any pain to palpation. There are no signs of joint or muscle swelling. There is no tenderness to deep palpation of muscles.   Psychiatric: No hallucinatory behavior. Denies symptoms of depression or suicidal ideation. Mood and affect appear normal on exam.     NEUROLOGICAL EXAM:   Mental status, orientation: Awake, alert and fully oriented.   Speech and language: speech is somewhat fluent. Moderately dysarthric, however coherent. The  patient is able to name, repeat and comprehend.   Cranial nerve exam: Pupils are 3-4 mm bilaterally and equally reactive to light. Visual fields are intact by confrontation. There is no nystagmus on primary or secondary gaze. Intact full EOM in all directions of gaze. Right upper/lower facial weakness present. Sensation in the face is intact to light touch. Uvula is midline. Palate elevates symmetrically. Tongue is midline and without any signs of tongue biting or fasciculations.Shoulder shrug is intact bilaterally.   Motor exam: Strength is 5/5 in RUE/RLE; flaccid to LUE/LLE. Tone is otherwise normal. No abnormal movements were seen on exam.   Sensory exam reveals normal sense of light touch and pinprick in all extremities. Tactile neglect/extinction present.   Deep tendon reflexes:  Plantar responses are flexor. There is no clonus.   Coordination: shows a normal finger-nose-finger on the Right, Left unable.     Gait: Not assessed at this time as patient is currently unable.       NIH Stroke Scale    1a Level of Consciousness   1b Orientation Questions   1c Response to Commands   2 Gaze 2  3 Visual Fields   4 Facial Movement 2  5 Motor Function (arm)   a Left 4  b Right   6 Motor Function (leg)   a Left 4  b Right   7 Limb Ataxia   8 Sensory   9 Language   10 Articulation 1  11 Extinction/Inattention 2    Score: 15        ANCILLARY DATA REVIEWED:     Lab Data Review:  Recent Results (from the past 24 hour(s))   CBC WITH DIFFERENTIAL    Collection Time: 03/14/22  3:16 PM   Result Value Ref Range    WBC 10.0 4.8 - 10.8 K/uL    RBC 3.55 (L) 4.20 - 5.40 M/uL    Hemoglobin 11.7 (L) 12.0 - 16.0 g/dL    Hematocrit 35.8 (L) 37.0 - 47.0 %    .8 (H) 81.4 - 97.8 fL    MCH 33.0 27.0 - 33.0 pg    MCHC 32.7 (L) 33.6 - 35.0 g/dL    RDW 49.7 35.9 - 50.0 fL    Platelet Count 251 164 - 446 K/uL    MPV 9.8 9.0 - 12.9 fL    Neutrophils-Polys 72.80 (H) 44.00 - 72.00 %    Lymphocytes 14.40 (L) 22.00 - 41.00 %    Monocytes 10.10  0.00 - 13.40 %    Eosinophils 1.50 0.00 - 6.90 %    Basophils 0.80 0.00 - 1.80 %    Immature Granulocytes 0.40 0.00 - 0.90 %    Nucleated RBC 0.00 /100 WBC    Neutrophils (Absolute) 7.25 (H) 2.00 - 7.15 K/uL    Lymphs (Absolute) 1.43 1.00 - 4.80 K/uL    Monos (Absolute) 1.00 (H) 0.00 - 0.85 K/uL    Eos (Absolute) 0.15 0.00 - 0.51 K/uL    Baso (Absolute) 0.08 0.00 - 0.12 K/uL    Immature Granulocytes (abs) 0.04 0.00 - 0.11 K/uL    NRBC (Absolute) 0.00 K/uL   PROTHROMBIN TIME    Collection Time: 03/14/22  3:16 PM   Result Value Ref Range    PT 16.8 (H) 12.0 - 14.6 sec    INR 1.41 (H) 0.87 - 1.13   APTT    Collection Time: 03/14/22  3:16 PM   Result Value Ref Range    APTT 30.2 24.7 - 36.0 sec       Labs reviewed by me.       Imaging reviewed by me:     CT-CEREBRAL PERFUSION ANALYSIS   Final Result      1.  Cerebral blood flow less than 30% likely representing completed infarct = 0 mL.      2.  T Max more than 6 seconds likely representing combination of completed infarct and ischemia = 121 mL.      3.  Mismatched volume likely representing ischemic brain/penumbra = infinite      4.  Please note that the cerebral perfusion was performed on the limited brain tissue around the basal ganglia region. Infarct/ischemia outside the CT perfusion sections can be missed in this study.      CT-CTA NECK WITH & W/O-POST PROCESSING   Final Result      1.  Mild bilateral atherosclerosis with less than 50% ICA stenosis.   2.  There is a nonspecific 4 mm posterior right upper lobe lung nodule.      Fleischner Society pulmonary nodule recommendations:   Low Risk: No routine follow-up      High Risk: Optional CT at 12 months      Comments: Nodules less than 6 mm do not require routine follow-up, but certain patients at high risk with suspicious nodule morphology, upper lobe location, or both may warrant 12-month follow-up.      Low Risk - Minimal or absent history of smoking and of other known risk factors.      High Risk - History of  smoking or of other known risk factors.      Note: These recommendations do not apply to lung cancer screening, patients with immunosuppression, or patients with known primary cancer.      Fleischner Society 2017 Guidelines for Management of Incidentally Detected Pulmonary Nodules in Adults         CT-CTA HEAD WITH & W/O-POST PROCESS   Final Result      1.  There is a right M1 occlusion. Findings were discussed with Narcisa Chapman by Dr. Zhu the neurointerventional radiologist who has already seen the exam on 3/14/2022 at 3:35 PM.      CT-HEAD W/O   Final Result      1.  There are periventricular and subcortical white matter changes present.  This finding is nonspecific and could be from indeterminate age small vessel ischemia, demyelination, or gliosis. MRI would be more sensitive for further evaluation.   2.  Mild paranasal sinus disease.            DX-CHEST-PORTABLE (1 VIEW)    (Results Pending)   IR-THROMBO MECHANICAL ARTERY,INIT    (Results Pending)       Presumed mechanism by TOAST:  __Large Artery Atherosclerosis  __Small Vessel (Lacunar)  __Cardioembolic  __Other (Sickle Cell, Vasculitis, Hypercoagulable)  _X_Unknown    Modified [place] Scale (MRS): 0 = No symptoms      ASSESSMENT AND PLAN:  71-year old female with PMhx significant for DVT (lower extremity; currently on reported 7-day course of Eliquis; unclear compliance), also with morbid obesity, recurrent UTIs, further details are limited who presented to [facility] on 3/14/22 for a chief complaint of Left sided weakness, onset 1350 om 3/14/22. On arrival here, stat CT head revealed no acute intracranial abnormality; CTA head/neck with Right MCA M1 thrombus. CT perfusion study with large (121 mL) mismatch/penumbra with no core. Given patient's concurrent use of Eliquis and indeterminate compliance/unknown dose, the decision was made to forgo IV thrombolytics and pursue only IR thrombectomy. Patient to IR now for Right MCA thrombectomy per   Marleen; NIHSS remains at 15.     Recommendations/Plan:     -Admit to ICU per post thrombectomy protocol.   -Neuro assessment and VS per post thrombectomy protocol. **BP goal to be determined by TICI score (See below). Antihypertensives per ICU team.   -Obtain MRI Brain wo contrast.   -Plan to re-initiate Eliquis (versus ASA 81 mg PO q day) in coming 48 hours based on results of MRI (unless otherwise dictated per Neuro IR Dr. Hawley).   -Telemetry; currently SR. Screen for Afib/arrhythmia. Obtain TTE (with bubble study, given recent DVTs of unclear etiology).   -Atorvastatin 80 mg PO q HS. Check lipid panel.   -Recommend aggressive BG management per primary team. Avoid IVF with Dextrose. BG goal 140-180. Check hemoglobin A1c.   -PT/OT/SLP eval and treat.   -Will follow up with results of the above and make additional recommendations accordingly. All other medical management per primary/ICU team.   -DVT PPX: SCDs.     After the endovascular intervention, please follow the following recommendations regarding blood pressure parameters:    If TICI 3: maintain systolic BP < 140  If TICI 2b: maintain systolic BP < 160  If TICI 2a or less, maintain systolic BP < 180 per tPA protocol    This is in an effort to minimize reperfusion injury and/or hemorrhagic conversion. Please keep SBP > 100 so as to not hypoperfuse.      The plan of care above has been discussed with Dr. Drew Monteiro. Please call with questions.     SACHI Butcher.  [facility]

## 2022-03-14 NOTE — ED NOTES
Med rec partially completed per pt's home pharmacy ([facility])  Unable to interview pt at this time  Pt went straight to IR   Unknown last doses of medications taken   Called , no answer     Per Daniel pt was prescribed Eliquis on 3/8/2022 with directions of Take 10 mg daily for 7 days, then 5 mg daily    Per [facility] and [facility] no other meds besides Eliquis has been filled recently

## 2022-03-14 NOTE — ASSESSMENT & PLAN NOTE
-H/o SVT on metoprolol 25 mg BID at home  -Restart home medication when cleared by SLP  -Monitor on telemetry  -IV labetalol or metoprolol PRN

## 2022-03-14 NOTE — ED NOTES
Chief Complaint   Patient presents with   • Unilateral Weakness     Left sided   • Facial Droop     1514: pt arrival to [facility] ed   and Neurology team present.  1520: taken pt to ct  1539: pt to red 7. 2nd iv access placed

## 2022-03-14 NOTE — ED PROVIDER NOTES
"ED Provider Note    CHIEF COMPLAINT  Chief Complaint   Patient presents with   • Unilateral Weakness     Left sided   • Facial Droop       HPI  Ceci Mata is a 71 y.o. female who presents to the emergency department by med flight secondary to stroke like symptoms. Flight team gives history that the patient dropped her iPad while sitting in the living room chair around 1:55 PM this evening. Significant other went into check on her hearing the iPad fall and she was noticed to not be moving her left side. With regards to significant past medical history she was recently diagnosed with lower extremity DVT and started on eliquis approximately one week ago.    Flight crew reports of vital signs and stable. Not hypertensive. Blood sugar was appropriate. No trauma.    History otherwise limited from patient given her current clinical condition.    REVIEW OF SYSTEMS  See HPI for further details. All other systems are negative.     PAST MEDICAL HISTORY       SOCIAL HISTORY  Social History     Tobacco Use   • Smoking status: Never Smoker   • Smokeless tobacco: Never Used   Substance and Sexual Activity   • Alcohol use: Not on file   • Drug use: Not on file   • Sexual activity: Not on file       SURGICAL HISTORY  patient denies any surgical history    CURRENT MEDICATIONS  Home Medications     Reviewed by Gerardo Apodaca (Pharmacy Tech) on 03/14/22 at 1557  Med List Status: Partial   Medication Last Dose Status   apixaban (ELIQUIS) 5mg Tab UNK Active   cephALEXin (KEFLEX) 500 MG Cap  Active   metoprolol (LOPRESSOR) 25 MG Tab  Active   omeprazole (PRILOSEC) 20 MG delayed-release capsule  Active                ALLERGIES  Allergies   Allergen Reactions   • Sulfa Drugs      itchy mouth and eyes       PHYSICAL EXAM  VITAL SIGNS: BP (!) 170/74   Pulse 82   Temp 36.7 °C (98 °F) (Temporal)   Resp 19   Ht 1.676 m (5' 6\")   Wt 113 kg (250 lb 1.6 oz)   SpO2 96%   BMI 40.37 kg/m²  @JUANA[[id number]::@   Pulse ox interpretation: I " interpret this pulse ox as normal.  Constitutional: Alert in no apparent distress.  HENT: No signs of trauma, Bilateral external ears normal, Nose normal.   Eyes: Pupils are equal and reactive  Neck: Normal range of motion, No tenderness, Supple  Cardiovascular: Regular rate and rhythm, no murmurs.   Thorax & Lungs: Normal breath sounds, No respiratory distress  Abdomen: Bowel sounds normal, Soft  Skin: Warm, Dry, No erythema, No rash.   Extremities: Intact distal pulses, No edema, No tenderness  Musculoskeletal: Good range of motion in all major joints. No tenderness to palpation or major deformities noted.   Neurologic: Alert , awake, left-sided facial droop and no movement of left upper or lower extremity.  NIH: 15  Psychiatric: Affect normal, Judgment normal, Mood normal.       DIAGNOSTIC STUDIES / PROCEDURES      LABS  Results for orders placed or performed during the hospital encounter of 03/14/22   CBC WITH DIFFERENTIAL   Result Value Ref Range    WBC 10.0 4.8 - 10.8 K/uL    RBC 3.55 (L) 4.20 - 5.40 M/uL    Hemoglobin 11.7 (L) 12.0 - 16.0 g/dL    Hematocrit 35.8 (L) 37.0 - 47.0 %    .8 (H) 81.4 - 97.8 fL    MCH 33.0 27.0 - 33.0 pg    MCHC 32.7 (L) 33.6 - 35.0 g/dL    RDW 49.7 35.9 - 50.0 fL    Platelet Count 251 164 - 446 K/uL    MPV 9.8 9.0 - 12.9 fL    Neutrophils-Polys 72.80 (H) 44.00 - 72.00 %    Lymphocytes 14.40 (L) 22.00 - 41.00 %    Monocytes 10.10 0.00 - 13.40 %    Eosinophils 1.50 0.00 - 6.90 %    Basophils 0.80 0.00 - 1.80 %    Immature Granulocytes 0.40 0.00 - 0.90 %    Nucleated RBC 0.00 /100 WBC    Neutrophils (Absolute) 7.25 (H) 2.00 - 7.15 K/uL    Lymphs (Absolute) 1.43 1.00 - 4.80 K/uL    Monos (Absolute) 1.00 (H) 0.00 - 0.85 K/uL    Eos (Absolute) 0.15 0.00 - 0.51 K/uL    Baso (Absolute) 0.08 0.00 - 0.12 K/uL    Immature Granulocytes (abs) 0.04 0.00 - 0.11 K/uL    NRBC (Absolute) 0.00 K/uL   COMP METABOLIC PANEL   Result Value Ref Range    Sodium 137 135 - 145 mmol/L    Potassium 4.6  3.6 - 5.5 mmol/L    Chloride 100 96 - 112 mmol/L    Co2 25 20 - 33 mmol/L    Anion Gap 12.0 7.0 - 16.0    Glucose 118 (H) 65 - 99 mg/dL    Bun 13 8 - 22 mg/dL    Creatinine 0.88 0.50 - 1.40 mg/dL    Calcium 9.4 8.5 - 10.5 mg/dL    AST(SGOT) 70 (H) 12 - 45 U/L    ALT(SGPT) 39 2 - 50 U/L    Alkaline Phosphatase 231 (H) 30 - 99 U/L    Total Bilirubin 1.2 0.1 - 1.5 mg/dL    Albumin 3.7 3.2 - 4.9 g/dL    Total Protein 7.1 6.0 - 8.2 g/dL    Globulin 3.4 1.9 - 3.5 g/dL    A-G Ratio 1.1 g/dL   PROTHROMBIN TIME   Result Value Ref Range    PT 16.8 (H) 12.0 - 14.6 sec    INR 1.41 (H) 0.87 - 1.13   APTT   Result Value Ref Range    APTT 30.2 24.7 - 36.0 sec   COD (ADULT)   Result Value Ref Range    ABO Grouping Only O     Rh Grouping Only POS     Antibody Screen-Cod NEG    TROPONIN   Result Value Ref Range    Troponin T 51 (H) 6 - 19 ng/L   ESTIMATED GFR   Result Value Ref Range    GFR If African American >60 >60 mL/min/1.73 m 2    GFR If Non African American >60 >60 mL/min/1.73 m 2   Hemoglobin A1C   Result Value Ref Range    Glycohemoglobin 4.3 4.0 - 5.6 %    Est Avg Glucose 77 mg/dL   VITAMIN B12   Result Value Ref Range    Vitamin B12 -True Cobalamin 3515 (H) 211 - 911 pg/mL   TSH WITH REFLEX TO FT4   Result Value Ref Range    TSH 2.530 0.380 - 5.330 uIU/mL   FOLATE   Result Value Ref Range    Folate -Folic Acid 12.8 >4.0 ng/mL   EKG (NOW)   Result Value Ref Range    Report       [facility]    Test Date:  2022  Pt Name:    MARILYN GREEN             Department: ER  MRN:        [id number]                      Room:       [facility]  Gender:     Female                       Technician: [facility]  :        [id number]                   Requested By:JHONY LONDONO  Order #:    [id number]                    Reading MD:    Measurements  Intervals                                Axis  Rate:       91                           P:          80  WI:         164                          QRS:        69  QRSD:       90                            T:          50  QT:         372  QTc:        458    Interpretive Statements  SINUS RHYTHM  ATRIAL PREMATURE COMPLEX  Compared to ECG 10/02/2020 14:06:13  Atrial premature complex(es) now present           RADIOLOGY  DX-CHEST-PORTABLE (1 VIEW)   Final Result      1.  No acute cardiac or pulmonary abnormalities are identified.      IR-THROMBO MECHANICAL ARTERY,INIT   Final Result         71-year-old patient who presented with acute onset of left-sided weakness and facial droop with an NIH stroke scale of 15 underwent emergent mechanical thrombectomy for an ICA terminus occlusion on the right side. The final angiographic images    demonstrate complete recanalization of the ICA, MCA on the right side with minimal distal embolization to a cortical M4 branch each in the superior and in the inferior division.      Final recanalization score: TICI 2C      I, Messikarlamaciel Zhu was physically present and participated during the entire procedure of the IR-THROMBO MECHANICAL ARTERY,INIT.                  CT-CEREBRAL PERFUSION ANALYSIS   Final Result      1.  Cerebral blood flow less than 30% likely representing completed infarct = 0 mL.      2.  T Max more than 6 seconds likely representing combination of completed infarct and ischemia = 121 mL.      3.  Mismatched volume likely representing ischemic brain/penumbra = infinite      4.  Please note that the cerebral perfusion was performed on the limited brain tissue around the basal ganglia region. Infarct/ischemia outside the CT perfusion sections can be missed in this study.      CT-CTA NECK WITH & W/O-POST PROCESSING   Final Result      1.  Mild bilateral atherosclerosis with less than 50% ICA stenosis.   2.  There is a nonspecific 4 mm posterior right upper lobe lung nodule.      Fleischner Society pulmonary nodule recommendations:   Low Risk: No routine follow-up      High Risk: Optional CT at 12 months      Comments: Nodules less than 6 mm do not require routine  follow-up, but certain patients at high risk with suspicious nodule morphology, upper lobe location, or both may warrant 12-month follow-up.      Low Risk - Minimal or absent history of smoking and of other known risk factors.      High Risk - History of smoking or of other known risk factors.      Note: These recommendations do not apply to lung cancer screening, patients with immunosuppression, or patients with known primary cancer.      Fleischner Society 2017 Guidelines for Management of Incidentally Detected Pulmonary Nodules in Adults         CT-CTA HEAD WITH & W/O-POST PROCESS   Final Result      1.  There is a right M1 occlusion. Findings were discussed with Narcisa Chapman by Dr. Zhu the neurointerventional radiologist who has already seen the exam on 3/14/2022 at 3:35 PM.      CT-HEAD W/O   Final Result      1.  There are periventricular and subcortical white matter changes present.  This finding is nonspecific and could be from indeterminate age small vessel ischemia, demyelination, or gliosis. MRI would be more sensitive for further evaluation.   2.  Mild paranasal sinus disease.            EC-ECHOCARDIOGRAM COMPLETE W/O CONT    (Results Pending)   MR-BRAIN-W/O    (Results Pending)   CT-HEAD W/O    (Results Pending)           COURSE & MEDICAL DECISION MAKING  Pertinent Labs & Imaging studies reviewed. (See chart for details)  71-year-old female presented emergency room with acute left sided deficits. Presented to ER via prehospital stroke alert. Emergently brought to CT scanner after being evaluated by myself and neurology team at charge test. CT imaging showing an M1 occlusion. Interventional team emergently consulted and patient brought to IR suite. While the patient was recently started on a blood thinner it was decided not to provide the patient with TNK and again patient brought directly for thrombotic to me.    I discussed the case with the intensivist on call and is aware that the patient is  currently in IR and will be brought to ICU after treatment.        FINAL IMPRESSION  1. Acute embolic stroke (HCC)            Electronically signed by: Luis Rosado M.D., 3/14/2022 3:21 PM

## 2022-03-14 NOTE — PROGRESS NOTES
"IR NOTE:    1547 RICU CN Mona aware of need for RICU bed.  Assigned 102.      1550 Pt presents to IR for cerebral angiogram with possible thrombectomy with moderate sedation performed by Dr. Zhu.  Pt gcs 15, in nad.   Pt pedal pulse palpated.  Pt transferred self to IR table without incident to a supine position. Comfort and Safety measures in place.  Pt placed on cardiac and respiratory monitoring.  Pt prepped and draped in sterile fashion.     Pt presents from ER with Geovanna PALACIOS.  Pt has right sided gaze that can cross the midline with direction.  Left facial droop and LUE/LLE flaccid.  Pt has periodic LLE spasm (pt states not intentional).  Pt gcs 15.  Pt presents on oxygen and remains on oxygen.  Per ER pt was sitting in chair and her  heard the iPad fall.  Found at 1350 with deficits by .  Pt states, \"My  said I was talking funny.\"    1601 Timeout completed, all team members agree.  Procedure begins.      1602 Lidocaine administered Right groin by Dr. Zhu.    1604 Right groin punctured by Dr. Zhu.      1605 Right groin access obtained by Dr. Zhu. 8F sheath right groin.      1613 1st angiogram performed by Dr. Zhu.    1617  Starting 1st pass Right MCA by Dr. Zhu. - Tici 0    1625 pt has periodic movement of lle, difficult to determine if spastic or intentional.    1626 Starting 2nd pass Right MCA by Dr. Zhu.      1630 Tici 2B obtained    1633 Starting 3rd pass Right MCA by Dr. Zhu.     1637 Tici 2C obtained    1640 Starting 4th pass Right MCA by Dr. Zhu.    1645 Tici 2C    1649 Angio Seal 8 F right groin  REF: [id number]  LOT: [id number]  EXP:  10/31/22    Ren holding pressure right groin x 20 min.       1651 Procedure complete.  Dr. Zhu out of room.      1704 Dr. Zhu aware of sbp in the 80's.  NS bolus infusing. No additional orders at this time.     1709 pt able to wiggle toes on ble and bilat hands. Pt remains gcs 15      1711 gauze and " tegaderm applied right groin.

## 2022-03-14 NOTE — ASSESSMENT & PLAN NOTE
-On omeprazole at home  -IV pantprazole daily while NPO, can change to home med when cleared by speech

## 2022-03-14 NOTE — ASSESSMENT & PLAN NOTE
-Right M1 occlusion s/p thrombectomy by IR  -SBP goal -  -Lipid panel pending, started on atorvastatin 40 mg OD, goal for LDL <70  -HbA1C pending, goal for normoglycemia  -Euthermia/eunatremia  -SCDs for now, will wait on aspirin or anti-coagulation until cleared by neurology  -PT/OT/SLP consult

## 2022-03-14 NOTE — CONSULTS
"Critical Care/Pulmonary Consultation    Date of Service: 3/14/2022    Date of Admission:  3/14/2022  3:14 PM    Consulting Physician: Kip Garza M.D.    Chief Complaint:  Unilateral Weakness (Left sided) and Facial Droop    History of Present Illness: Ceci Mata is a 71 y.o. female with a past medical history of gastroesophageal reflux disease (on omeprazole 20 mg OD), paroxysmal SVT (metoprolol 25 mg BID), left leg DVT for which recently started on apixaban on 3/8/22, BMI 40,  Who presented to the ED for left sided weakness and facial droop. Last known well:1350    Code stroke called in the ED, evaluated by neurology, NIHSS 15 on arrival.  CT head showed no evidence of hemorrhage, mild sinus disease. CTA head & neck showed Right M1 occlusion. Not a candidate for TPA given patient on anti-coagulation.  Underwent IR guided thrombectomy with TICI 2C and is admitted to neuro-ICU for serial neurological monitoring and BP control. She received a dose of labetolol 10 mg IV at arrival, Il IVF NS bolus for low BP after procedure.    Review of Systems   Unable to perform ROS: Acuity of condition     Home Medications     Reviewed by Gerardo Apodaca (Pharmacy Tech) on 03/14/22 at 1557  Med List Status: Partial   Medication Last Dose Status   apixaban (ELIQUIS) 5mg Tab UNK Active   cephALEXin (KEFLEX) 500 MG Cap  Active   metoprolol (LOPRESSOR) 25 MG Tab  Active   omeprazole (PRILOSEC) 20 MG delayed-release capsule  Active              Social History     Tobacco Use   • Smoking status: Never Smoker   • Smokeless tobacco: Never Used      No past medical history on file.    No past surgical history on file.    Allergies: Sulfa drugs    No family history on file.    Vitals:    03/14/22 1527 03/14/22 1539 03/14/22 1541 03/14/22 1542   Height: 1.676 m (5' 6\")      Weight: 118 kg (260 lb)   113 kg (250 lb 1.6 oz)   Weight % change since last entry.: 0 %   -3.81 %   BP:  (!) 198/83 (!) 210/88 (!) 185/78   Pulse:  (!) 109 " (!) 104 (!) 105   BMI (Calculated): 41.97      Resp:  20 20 20   Temp:    37.2 °C (98.9 °F)   TempSrc:    Temporal    03/14/22 1543 03/14/22 1548 03/14/22 1601 03/14/22 1602   Height:       Weight:       Weight % change since last entry.:       BP: (!) 185/78 156/65 116/68 120/58   Pulse: (!) 102 100 91 89   BMI (Calculated):       Resp: 20 20 (!) 23 (!) 25   Temp:       TempSrc:        03/14/22 1607 03/14/22 1612 03/14/22 1617 03/14/22 1622   Height:       Weight:       Weight % change since last entry.:       BP: 115/54 117/58 113/53 109/53   Pulse: 89 88 88 86   BMI (Calculated):       Resp: (!) 22 (!) 22 (!) 23 (!) 22   Temp:       TempSrc:        03/14/22 1627 03/14/22 1632 03/14/22 1637 03/14/22 1642   Height:       Weight:       Weight % change since last entry.:       BP: 110/55 129/61 122/58 111/54   Pulse: 89 84 86 86   BMI (Calculated):       Resp: (!) 23 18 18 13   Temp:       TempSrc:        03/14/22 1647 03/14/22 1651 03/14/22 1658 03/14/22 1703   Height:       Weight:       Weight % change since last entry.:       BP: 106/50 104/47 (!) 81/45 (!) 87/43   Pulse: 83 85 84 86   BMI (Calculated):       Resp: 17 20 20 20   Temp:       TempSrc:        03/14/22 1707   Height:    Weight:    Weight % change since last entry.:    BP: (!) 76/38   Pulse: 85   BMI (Calculated):    Resp:    Temp:    TempSrc:      Physical Exam  Constitutional:       General: She is not in acute distress.     Appearance: She is obese.   HENT:      Head: Normocephalic and atraumatic.      Nose: Nose normal.      Mouth/Throat:      Mouth: Mucous membranes are moist.   Eyes:      Extraocular Movements: Extraocular movements intact.      Conjunctiva/sclera: Conjunctivae normal.      Pupils: Pupils are equal, round, and reactive to light.      Comments: Right sided gaze preference   Cardiovascular:      Rate and Rhythm: Normal rate and regular rhythm.      Pulses: Normal pulses.      Heart sounds: Normal heart sounds.   Pulmonary:       Effort: Pulmonary effort is normal. No respiratory distress.      Breath sounds: Normal breath sounds.   Abdominal:      General: Abdomen is flat. Bowel sounds are normal. There is no distension.      Palpations: Abdomen is soft. There is no mass.      Tenderness: There is no abdominal tenderness.   Musculoskeletal:         General: No swelling or tenderness. Normal range of motion.      Cervical back: Normal range of motion and neck supple.   Skin:     General: Skin is warm and dry.      Capillary Refill: Capillary refill takes less than 2 seconds.   Neurological:      Mental Status: She is alert and oriented to person, place, and time.      Cranial Nerves: Cranial nerve deficit (L facial droop +) present.      Motor: Weakness (LUE 3/5, LLE 3/5) present.   Psychiatric:         Mood and Affect: Mood normal.         Behavior: Behavior normal.         Thought Content: Thought content normal.         Judgment: Judgment normal.     No intake or output data in the 24 hours ending 03/14/22 1622    Recent Labs     03/14/22  1516   WBC 10.0   NEUTSPOLYS 72.80*   LYMPHOCYTES 14.40*   MONOCYTES 10.10   EOSINOPHILS 1.50   BASOPHILS 0.80   ASTSGOT 70*   ALTSGPT 39   ALKPHOSPHAT 231*   TBILIRUBIN 1.2     Recent Labs     03/14/22  1516   SODIUM 137   POTASSIUM 4.6   CHLORIDE 100   CO2 25   BUN 13   CREATININE 0.88   CALCIUM 9.4     Recent Labs     03/14/22  1516   ALTSGPT 39   ASTSGOT 70*   ALKPHOSPHAT 231*   TBILIRUBIN 1.2   GLUCOSE 118*         CT-CEREBRAL PERFUSION ANALYSIS   Final Result      1.  Cerebral blood flow less than 30% likely representing completed infarct = 0 mL.      2.  T Max more than 6 seconds likely representing combination of completed infarct and ischemia = 121 mL.      3.  Mismatched volume likely representing ischemic brain/penumbra = infinite      4.  Please note that the cerebral perfusion was performed on the limited brain tissue around the basal ganglia region. Infarct/ischemia outside the CT  perfusion sections can be missed in this study.      CT-CTA NECK WITH & W/O-POST PROCESSING   Final Result      1.  Mild bilateral atherosclerosis with less than 50% ICA stenosis.   2.  There is a nonspecific 4 mm posterior right upper lobe lung nodule.      Fleischner Society pulmonary nodule recommendations:   Low Risk: No routine follow-up      High Risk: Optional CT at 12 months      Comments: Nodules less than 6 mm do not require routine follow-up, but certain patients at high risk with suspicious nodule morphology, upper lobe location, or both may warrant 12-month follow-up.      Low Risk - Minimal or absent history of smoking and of other known risk factors.      High Risk - History of smoking or of other known risk factors.      Note: These recommendations do not apply to lung cancer screening, patients with immunosuppression, or patients with known primary cancer.      Fleischner Society 2017 Guidelines for Management of Incidentally Detected Pulmonary Nodules in Adults         CT-CTA HEAD WITH & W/O-POST PROCESS   Final Result      1.  There is a right M1 occlusion. Findings were discussed with Narcisa Chapman by Dr. Zhu the neurointerventional radiologist who has already seen the exam on 3/14/2022 at 3:35 PM.      CT-HEAD W/O   Final Result      1.  There are periventricular and subcortical white matter changes present.  This finding is nonspecific and could be from indeterminate age small vessel ischemia, demyelination, or gliosis. MRI would be more sensitive for further evaluation.   2.  Mild paranasal sinus disease.            DX-CHEST-PORTABLE (1 VIEW)    (Results Pending)   IR-THROMBO MECHANICAL ARTERY,INIT    (Results Pending)     Patient Active Problem List   Diagnosis   • Lung nodule < 6cm on CT   • GERD (gastroesophageal reflux disease)   • Paroxysmal SVT (supraventricular tachycardia) ([facility])   • Anemia     Assessment and Plan:  Acute ischemic stroke (HCC)  -Right MCA syndrome with M1 occlusion  s/p thrombectomy with TICI 2C by IR  -SBP goal 120 -140  -Avoid hypotension 120 systolic.  Use IV 0.9% normal saline bolus if necessary  -Serial neurologic exams per protocol  -Lipid panel pending, started on atorvastatin 80 mg OD, goal for LDL <70  -HbA1C pending, goal for normoglycemia  -Pending TTE  -Pending MRI brain WO  -Euthermia/eunatremia  -Hold Aspirin until repeat imaging  -Hold apixaban until repeat imaging  -SCDs for now, will wait on aspirin or anti-coagulation until cleared by neurology  -PT/OT/SLP consult    Acute hypoxic respiratory failure  -On 3L NC  -Likely due to stroke  -Encourage IS and mobility as appropriate    GERD (gastroesophageal reflux disease)  -On omeprazole at home  -IV pantprazole daily while NPO, can change to home med when cleared by speech    Paroxysmal SVT (supraventricular tachycardia) (HCC)  -H/o SVT on metoprolol 25 mg BID at home  -Restart home medication when cleared by SLP  -Monitor on telemetry  -IV labetalol or metoprolol PRN     Anemia  -Macrocytic anemia  -Pending B12/folate/TSH with reflex T4  -CTM    Lung nodule < 6cm on CT  Incidental finding of 4 mm posterior right upper lobe lung nodule  Follow up with PCP      DVT prophylaxis: SCDs (no pharmacological in setting of thrombectomy)  GI prophylaxis: PPI  Code Status: Full Code

## 2022-03-15 NOTE — THERAPY
Physical Therapy Contact Note    PT consult received and acknowledged. Chart indicates patient's activity orders begin 1615 on 3/15. Will initiate PT eval as able and appropriate.    Trisha Davis, PT, DPT  [phone]

## 2022-03-15 NOTE — THERAPY
Speech Language Pathology   Clinical Swallow Evaluation     Patient Name: Ceci Mata  AGE:  71 y.o., SEX:  female  Medical Record #: [id number]  Today's Date: 3/15/2022          Assessment    Patient is 71 y.o. F, PMHx recent DVT with acute left-sided weakness and facial droop underwent successful mechanical thrombectomy of M1 occlusion     Level of Consciousness: Alert, Awake  Affect/Behavior: Calm, Cooperative  Follows Directives: Yes  Orientation: Oriented x 4    Prior Living Situation & Level of Function:  Patient previously with a regular diet and thin liquids    Oral Mechanism Evaluation  Facial Symmetry: Central L facial droop  Facial Sensation: Equal  Labial Observations: WFL  Lingual Observations: Midline  Dentition: Fair, Lower denture  Comments: Bottom dentures not in place    Voice  Quality: Hoarse  Resonance: WFL  Intensity: Appropriate  Cough: WFL  Comments:    Current Method of Nutrition   NPO until cleared by speech pathology    Assessment  Positioning: Richardson's (60-90 degrees)  Bolus Administration: Patient  Oxygen Requirements: 2 L Nasal Cannula  Factor(s) Affecting Performance: None    Swallowing Trials  Ice: WFL  Thin Liquid (TN0): WFL  Liquidised (LQ3): WFL  Minced & Moist (MM5): WFL  Regular (RG7): Impaired    Comments:    Patient with adequate oral acceptance and containment. Adequate bite and mastication of presented bolus. Presumed timely AP transit and timely swallow trigger. Patient without residue or pocketing despite significant facial droop. Patient reported difficulty with regular textures, likely related to missing bottom dentition, but consumed minced moist textures without difficulty. Patient is currently appropriate to initiate a short term modified diet.      Clinical Impressions    Oropharyngeal dysphagia suspected based on clinical bedside evaluation. Contributing risk factors include deficits with significant facial droop, thrombectomy and R MCA stroke. The patient would  "benefit from a short term modified diet of minced moist textures and thin liquids.     Recommendations  1.  Minced moist textures with thin liquids  2.  Instrumentation: Instrumental swallow study pending clinical progress  3.  Swallowing Instructions & Precautions:   Supervision: 1:1 feeding with constant supervision, Close supervision - patient may be left alone for less than 5 minutes at a time  Positioning: Fully upright and midline during oral intake  Medication: Crush with applesauce or puree, as appropriate  Strategies: Small bites/sips, Alternate bites and sips  Oral Care: Q6h    Plan    Recommend Speech Therapy 5 times per week until therapy goals are met for the following treatments:  Dysphagia Training.    Discharge Recommendations: Recommend post-acute placement for additional speech therapy services prior to discharge home    Objective       03/15/22 0850   Initial Contact Note    Initial Contact Note  Order Received and Verified, Speech Therapy Evaluation in Progress with Full Report to Follow.   Vitals   O2 (LPM) 2   O2 Delivery Device Nasal Cannula   Pain 0 - 10 Group   Therapist Pain Assessment Post Activity Pain Same as Prior to Activity;Nurse Notified;0   Prior Living Situation   Prior Services Home-Independent   Prior Level Of Function   Communication Within Functional Limits   Swallow Within Functional Limits   Dentition Poor Quality    Dentures Lowers   Hearing Within Functional Limits for Evaluation   Vision Within Functional Limits for Evaluation   Patient's Primary Language English   Occupation (Pre-Hospital Vocational) Retired Due To Age   Patient / Family Goals   Patient / Family Goal #1 \"I am so thirsty\"   Short Term Goals   Short Term Goal # 1 Pt will consume thin liquids and mildly thick liquids with no overt s/sx of aspiration   Education Group   Education Provided Dysphagia   Dysphagia Patient Response Patient;Acceptance;Explanation;Verbal Demonstration   Problem List   Problem List " Dysphagia   Anticipated Discharge Needs   Discharge Recommendations Recommend post-acute placement for additional speech therapy services prior to discharge home   Therapy Recommendations Upon DC Dysphagia Training

## 2022-03-15 NOTE — HOSPITAL COURSE
71 y.o. female admitted 3/14/2022 with a past medical history of PSVT, recent DVT (elequis), obesity who presented to the emergency room on 3/14/2022 with acute left sided weakness and left-sided facial droop, NIH SS 15.  Alteplase was held due to her chronic anticoagulation however she was taken to IR for thrombectomy of right M1 occlusion with TICI 2C flow.

## 2022-03-15 NOTE — OR SURGEON
Immediate Post- Operative Note         Immediate Post- Operative Note        Findings: Cerebral Angiogram showed a right M1 occlusion. TICI 2C recanalization achieved      Procedure(s): Cerebral angiogram and mechanical thrombectomy      Estimated Blood Loss: ~ 100 ml      Complications: None        3/14/2022     5:03 PM     Abdirahman Zhu M.D.

## 2022-03-15 NOTE — ASSESSMENT & PLAN NOTE
-Incidental finding on CT  -4 mm posterior right upper lobe lung nodule  -Will need follow-up with PCP

## 2022-03-15 NOTE — ASSESSMENT & PLAN NOTE
-Right MCA syndrome with M1 occlusion-s/p IR thrombectomy with TICI 2C  -FU MRI of brain this am (3/15) revealed acute ischemic infarct involving  right temporal region/right MCA territory with mild to   moderate hemorrhagic conversion  - Hold antithrombotics /anticoagulation for 3- 5 days per neurology recs  -SBP goal - normotension 100-130/60-80-* maintain SBP < 140  -PRN  Antihypertensives for SBP < 140 - PRNs, Cardene infusion.  -Q4 hr neuro check tonight   -TTE with bubble  ordered and pending  -passed swallow eval this am will now start PO Atorvastatin with goal of   LDL of 70 (curren )   - aggressive blood glucose management 140-180 A1c 4.3  - Eunatremia, euglycemia,  -DVT PPR SCD's  - PT, OT. SLT   - physiatry consult

## 2022-03-15 NOTE — PROGRESS NOTES
Critical Care Progress Note    Date of admission  3/14/2022    Chief Complaint  Left sided weakness, left facial droop    Hospital Course  71 y.o. female admitted 3/14/2022 with a past medical history of PSVT, recent DVT (elequis), obesity who presented to the emergency room on 3/14/2022 with acute left sided weakness and left-sided facial droop, NIH SS 15.  Alteplase was held due to her chronic anticoagulation however she was taken to IR for thrombectomy of right M1 occlusion with TICI 2C flow.      Interval Problem Update  Reviewed last 24 hour events:  -S/P day #1 IR tr;hombectomy of right MCA with TICI 2c  -FU MRI of brain this am (3/15) revealed acute ischemic infarct involving    right temporal region/right MCA territory with mild to moderate     hemorrhagic conversion  - Hold antithrombotics /anticoagulation for 3- 5 days per neurology recs  -SBP goal - normotension 100-130/60-80-* maintain SBP < 140  -PRN  Antihypertensives for SBP < 140 - PRNs, Cardene infusion.  -Q4 hr neuro check tonight   -TTE with bubble  ordered and pending  -passed swallow eval this am will now start PO Atorvastatin with goal of   LDL of 70 (curren )   - Aggressive blood glucose management 140-180 A1c4.3  -DVT PPR SCD's  - PT, OT. SLT   - physiatry consult     Review of Systems  Review of Systems   Constitutional: Negative for chills and fever.   HENT: Negative for hearing loss.    Eyes: Negative for blurred vision.   Respiratory: Negative for cough and shortness of breath.    Cardiovascular: Negative for chest pain and palpitations.   Gastrointestinal: Negative for abdominal pain, heartburn, nausea and vomiting.   Genitourinary: Negative for dysuria.   Musculoskeletal: Positive for myalgias. Negative for back pain and neck pain.   Skin: Negative for rash.   Neurological: Positive for weakness and headaches. Negative for dizziness, sensory change, seizures and loss of consciousness.        Vital Signs for last 24 hours   Temp:   [36.1 °C (96.9 °F)-37.2 °C (98.9 °F)] 36.1 °C (96.9 °F)  Pulse:  [] 89  Resp:  [13-25] 19  BP: ()/(38-88) 137/65  SpO2:  [91 %-99 %] 96 %    Hemodynamic parameters for last 24 hours       Respiratory Information for the last 24 hours       Physical Exam   Physical Exam  Vitals and nursing note reviewed.   HENT:      Head: Normocephalic and atraumatic.      Mouth/Throat:      Mouth: Mucous membranes are moist.   Eyes:      General: Lids are normal.      Pupils: Pupils are equal, round, and reactive to light.   Cardiovascular:      Rate and Rhythm: Normal rate and regular rhythm.      Pulses:           Radial pulses are 2+ on the right side and 2+ on the left side.        Dorsalis pedis pulses are 1+ on the right side and 1+ on the left side.      Heart sounds: Normal heart sounds.   Pulmonary:      Effort: Pulmonary effort is normal.      Breath sounds: Normal breath sounds.   Chest:      Comments: Equal chest rise and fall with respirations  Abdominal:      General: Bowel sounds are normal.      Palpations: Abdomen is soft.      Comments: Bowel sounds present   Genitourinary:     Comments: P-wick in place- draining clear yellow urine.  Musculoskeletal:      Cervical back: Full passive range of motion without pain and normal range of motion.   Skin:     General: Skin is warm and dry.      Capillary Refill: Capillary refill takes less than 2 seconds.   Neurological:      Mental Status: She is alert.      GCS: GCS eye subscore is 4. GCS verbal subscore is 5. GCS motor subscore is 6.      Comments:  RL 3 mm  Awake alert and oreinted X 4  BUI- RU5/5, DC 3/5; LL 5/5, RL 4/5  Mild dysarthria  Left facial droop  tongue mid line     Psychiatric:         Behavior: Behavior normal. Behavior is cooperative.         Cognition and Memory: Cognition normal.         Medications  Current Facility-Administered Medications   Medication Dose Route Frequency Provider Last Rate Last Admin   • [START ON 3/16/2022]  omeprazole (FIRST-OMEPRAZOLE) 2 mg/mL oral susp 40 mg  40 mg Oral DAILY Rosi Pham A.P.R.N.       • metoprolol tartrate (LOPRESSOR) tablet 25 mg  25 mg Oral TWICE DAILY Rosi Pham A.P.R.N.       • labetalol (NORMODYNE/TRANDATE) injection 10 mg  10 mg Intravenous Q10 MIN PRN Rosi Pham A.P.R.N.   10 mg at 03/14/22 2110   • niCARdipine (CARDENE) 25 mg in  mL Infusion  0-15 mg/hr Intravenous Continuous Rosi Pham A.P.R.N.   Dose not Required at 03/14/22 1600   • senna-docusate (PERICOLACE or SENOKOT S) 8.6-50 MG per tablet 2 Tablet  2 Tablet Oral BID Vilma Pearce M.D.        And   • polyethylene glycol/lytes (MIRALAX) PACKET 1 Packet  1 Packet Oral QDAY PRN Vilma Pearce M.D.        And   • magnesium hydroxide (MILK OF MAGNESIA) suspension 30 mL  30 mL Oral QDAY PRN Vilma Pearce M.D.        And   • bisacodyl (DULCOLAX) suppository 10 mg  10 mg Rectal QDAY PRN Vilma Pearce M.D.       • Respiratory Therapy Consult   Nebulization Continuous RT Vilma Pearce M.D.       • acetaminophen (Tylenol) tablet 650 mg  650 mg Oral Q6HRS PRN Vilma Pearce M.D.   650 mg at 03/15/22 1214   • atorvastatin (LIPITOR) tablet 80 mg  80 mg Oral Q EVENING Vilma Pearce M.D.       • hydrALAZINE (APRESOLINE) injection 10 mg  10 mg Intravenous Q6HRS PRN Rosi Pham A.P.R.NMendy       • morphine 4 MG/ML injection 1 mg  1 mg Intravenous Q4HRS PRN Andressa Clement M.D.   1 mg at 03/15/22 0441       Fluids    Intake/Output Summary (Last 24 hours) at 3/15/2022 1351  Last data filed at 3/15/2022 0600  Gross per 24 hour   Intake --   Output 900 ml   Net -900 ml       Laboratory          Recent Labs     03/14/22  1516 03/15/22  0435   SODIUM 137 138   POTASSIUM 4.6 4.5   CHLORIDE 100 105   CO2 25 21   BUN 13 12   CREATININE 0.88 0.69   CALCIUM 9.4 8.3*     Recent Labs     03/14/22  1516 03/15/22  0435   ALTSGPT 39 29   ASTSGOT 70* 55*   ALKPHOSPHAT 231* 176*   TBILIRUBIN 1.2 0.7   GLUCOSE 118* 119*     Recent Labs      03/14/22  1516 03/15/22  0435   WBC 10.0 12.0*   NEUTSPOLYS 72.80* 69.50   LYMPHOCYTES 14.40* 18.20*   MONOCYTES 10.10 10.50   EOSINOPHILS 1.50 0.50   BASOPHILS 0.80 0.80   ASTSGOT 70* 55*   ALTSGPT 39 29   ALKPHOSPHAT 231* 176*   TBILIRUBIN 1.2 0.7     Recent Labs     03/14/22  1516 03/15/22  0435   RBC 3.55* 2.72*   HEMOGLOBIN 11.7* 9.0*   HEMATOCRIT 35.8* 28.6*   PLATELETCT 251 223   PROTHROMBTM 16.8*  --    APTT 30.2  --    INR 1.41*  --        Imaging  X-Ray:  I have personally reviewed the images and compared with prior images.  CT:    Reviewed    Assessment/Plan  Paroxysmal SVT (supraventricular tachycardia) (HCC)  Assessment & Plan  -History of SVT taking metoprolol 25 mg twice daily at home  -Restart medication when cleared by speech/swallow  -Continuous telemetry  -IV labetalol or metoprolol as needed    GERD (gastroesophageal reflux disease)- (present on admission)  Assessment & Plan  IV pantoprazole daily while n.p.o.  Continue omeprazole-home medication once cleared by speech/swallow    Anemia- (present on admission)  Assessment & Plan  -Macrocytic anemia  -Eval retic count  - liver enzymes  - unknown etiology at this time    Lung nodule < 6cm on CT- (present on admission)  Assessment & Plan  -Incidental finding on CT  -4 mm posterior right upper lobe lung nodule  -Will need follow-up with PCP       VTE:  Contraindicated  Ulcer: Not Indicated  Lines: None    I have performed a physical exam and reviewed and updated ROS and Plan today (3/15/2022). In review of yesterday's note (3/14/2022), there are no changes except as documented above.     Discussed patient condition and risk of morbidity and/or mortality with Family, RN, RT, Pharmacy, Dietary, Code status disscussed, Charge nurse / hot rounds, Patient and neurology  The patient remains critically ill.  Critical care time = 50 minutes in directly providing and coordinating critical care and extensive data review.  No time overlap and excludes  procedures.

## 2022-03-15 NOTE — ASSESSMENT & PLAN NOTE
-History of SVT taking metoprolol 25 mg twice daily at home  -Restart medication when cleared by speech/swallow  -Continuous telemetry  -IV labetalol or metoprolol as needed

## 2022-03-15 NOTE — PROGRESS NOTES
"Radiology Progress Note   Author: JAYLA Wallace Date & Time created: 3/15/2022  1:28 PM   Date of admission  3/14/2022  Note to reader: this note follows the APSO format rather than the historical SOAP format. Assessment and plan located at the top of the note for ease of use.    Chief Complaint  71 y.o. female admitted 3/14/2022 with   Chief Complaint   Patient presents with   • Unilateral Weakness     Left sided   • Facial Droop       HPI  \" 71-year old female with PMhx significant for DVT (lower extremity; currently on reported 7-day course of Eliquis; unclear compliance), also with morbid obesity, recurrent UTIs, further details are limited who presented to [facility] on 3/14/22 for a chief complaint of Left sided weakness, onset 1350 om 3/14/22. On arrival here, stat CT head revealed no acute intracranial abnormality; CTA head/neck with Right MCA M1 thrombus. CT perfusion study with large (121 mL) mismatch/penumbra with no core. Given patient's concurrent use of Eliquis and indeterminate compliance/unknown dose, the decision was made to forgo IV thrombolytics and pursue only IR thrombectomy. Patient now s/p IR Right MCA thrombectomy per Dr. Hawley with TICI 2; NIHSS 15-->7 today; MRI Brain wo contrast has revealed acute ischemic infarct involving Right temporal region/R MCA territory with mild/moderate hemorrhagic conversion\"      Assessment/Plan  Interval History   Active Problems:    Lung nodule < 6cm on CT    GERD (gastroesophageal reflux disease)    Paroxysmal SVT (supraventricular tachycardia) (HCC)    Anemia      Plan IR  - Neuro checks per ICU protocol  - Secondary stroke prevention per Neurology recommendations  - SCD for DVT PPX, hemorrhagic conversion     -Thank you for allowing Neuro Interventional Radiology team to participate in the patients care, if any additional care or requests are needed in the future please do not hesitate call or place IR order   [phone]      IR: "   3/14- Right M1 occlusion. TICI 2C recanalization achieved  3/15- Left facial weakness, left arm weakness 3/5, AXO4, mildly dysarthric,  MRI shows mild hemorrhagic conversion. IR Right groin site CDI, no redness or swelling. Discussed with IR MD Zhu               Review of Systems  Physical Exam   Review of Systems   Constitutional: Positive for malaise/fatigue. Negative for chills and fever.   HENT: Negative for hearing loss.    Eyes: Negative for blurred vision.   Respiratory: Negative for cough, hemoptysis and shortness of breath.    Cardiovascular: Negative for chest pain and palpitations.   Gastrointestinal: Negative for abdominal pain and vomiting.   Genitourinary: Negative for dysuria.   Musculoskeletal: Negative for myalgias.   Skin: Negative for rash.   Neurological: Positive for speech change, focal weakness, weakness and headaches. Negative for dizziness, tingling and sensory change.   Endo/Heme/Allergies: Does not bruise/bleed easily.   Psychiatric/Behavioral: Negative for suicidal ideas.      Vitals:    03/15/22 1215   BP: 137/65   Pulse: 89   Resp: 19   Temp:    SpO2: 96%        Physical Exam  Constitutional:       Appearance: She is obese.   HENT:      Head: Normocephalic.      Nose: Nose normal.      Mouth/Throat:      Mouth: Mucous membranes are moist.   Eyes:      General: No visual field deficit.     Pupils: Pupils are equal, round, and reactive to light.   Cardiovascular:      Rate and Rhythm: Normal rate.   Pulmonary:      Effort: Pulmonary effort is normal. No respiratory distress.   Abdominal:      Tenderness: There is no abdominal tenderness.   Musculoskeletal:         General: No tenderness or deformity.      Cervical back: Normal range of motion.   Skin:     General: Skin is warm and dry.      Capillary Refill: Capillary refill takes less than 2 seconds.      Coloration: Skin is not jaundiced or pale.   Neurological:      Mental Status: She is alert and oriented to person, place, and  time.      Cranial Nerves: Dysarthria and facial asymmetry present.      Sensory: Sensation is intact.      Motor: Weakness and pronator drift present.      Coordination: Coordination abnormal. Finger-Nose-Finger Test abnormal.   Psychiatric:         Mood and Affect: Mood normal.         Behavior: Behavior normal.             Labs    Recent Labs     03/14/22  1516 03/15/22  0435   WBC 10.0 12.0*   RBC 3.55* 2.72*   HEMOGLOBIN 11.7* 9.0*   HEMATOCRIT 35.8* 28.6*   .8* 105.1*   MCH 33.0 33.1*   MCHC 32.7* 31.5*   RDW 49.7 51.5*   PLATELETCT 251 223   MPV 9.8 10.0     Recent Labs     03/14/22  1516 03/15/22  0435   SODIUM 137 138   POTASSIUM 4.6 4.5   CHLORIDE 100 105   CO2 25 21   GLUCOSE 118* 119*   BUN 13 12   CREATININE 0.88 0.69   CALCIUM 9.4 8.3*     Recent Labs     03/14/22  1516 03/15/22  0435   ALBUMIN 3.7 3.0*   TBILIRUBIN 1.2 0.7   ALKPHOSPHAT 231* 176*   TOTPROTEIN 7.1 5.7*   ALTSGPT 39 29   ASTSGOT 70* 55*   CREATININE 0.88 0.69     MR-BRAIN-W/O   Final Result         Acute infarct in the right insula/basal ganglia/right caudate region.      Punctate foci of acute infarction involving the right frontoparietal cortex and subcortical region.      Multiple foci of acute infarction involving the bilateral inferior cerebellum.      Focal area of parenchymal hemorrhage in the right insular region with minimal surrounding edema and slight mass effect upon the right lateral ventricle.      Minimal subarachnoid hemorrhage in the sulci in the right temporoparietal region.      DX-CHEST-PORTABLE (1 VIEW)   Final Result      1.  No acute cardiac or pulmonary abnormalities are identified.      IR-THROMBO MECHANICAL ARTERY,INIT   Final Result         71-year-old patient who presented with acute onset of left-sided weakness and facial droop with an NIH stroke scale of 15 underwent emergent mechanical thrombectomy for an ICA terminus occlusion on the right side. The final angiographic images    demonstrate complete  recanalization of the ICA, MCA on the right side with minimal distal embolization to a cortical M4 branch each in the superior and in the inferior division.      Final recanalization score: TICI 2C      I, Abdirahman Zhu was physically present and participated during the entire procedure of the IR-THROMBO MECHANICAL ARTERY,INIT.                  CT-CEREBRAL PERFUSION ANALYSIS   Final Result      1.  Cerebral blood flow less than 30% likely representing completed infarct = 0 mL.      2.  T Max more than 6 seconds likely representing combination of completed infarct and ischemia = 121 mL.      3.  Mismatched volume likely representing ischemic brain/penumbra = infinite      4.  Please note that the cerebral perfusion was performed on the limited brain tissue around the basal ganglia region. Infarct/ischemia outside the CT perfusion sections can be missed in this study.      CT-CTA NECK WITH & W/O-POST PROCESSING   Final Result      1.  Mild bilateral atherosclerosis with less than 50% ICA stenosis.   2.  There is a nonspecific 4 mm posterior right upper lobe lung nodule.      Fleischner Society pulmonary nodule recommendations:   Low Risk: No routine follow-up      High Risk: Optional CT at 12 months      Comments: Nodules less than 6 mm do not require routine follow-up, but certain patients at high risk with suspicious nodule morphology, upper lobe location, or both may warrant 12-month follow-up.      Low Risk - Minimal or absent history of smoking and of other known risk factors.      High Risk - History of smoking or of other known risk factors.      Note: These recommendations do not apply to lung cancer screening, patients with immunosuppression, or patients with known primary cancer.      Fleischner Society 2017 Guidelines for Management of Incidentally Detected Pulmonary Nodules in Adults         CT-CTA HEAD WITH & W/O-POST PROCESS   Final Result      1.  There is a right M1 occlusion. Findings were  discussed with Narcisa Chapman by Dr. Zhu the neurointerventional radiologist who has already seen the exam on 3/14/2022 at 3:35 PM.      CT-HEAD W/O   Final Result      1.  There are periventricular and subcortical white matter changes present.  This finding is nonspecific and could be from indeterminate age small vessel ischemia, demyelination, or gliosis. MRI would be more sensitive for further evaluation.   2.  Mild paranasal sinus disease.            EC-ECHOCARDIOGRAM COMPLETE W/O CONT    (Results Pending)       INR   Date Value Ref Range Status   03/14/2022 1.41 (H) 0.87 - 1.13 Final     Comment:     INR - Non-therapeutic Reference Range: 0.87-1.13  INR - Therapeutic Reference Range: 2.0-4.0       No results found for: POCINR     Intake/Output Summary (Last 24 hours) at 3/15/2022 1328  Last data filed at 3/15/2022 0600  Gross per 24 hour   Intake --   Output 900 ml   Net -900 ml      Labs not explicitly included in this progress note were reviewed by the author. Radiology/imaging not explicitly included in this progress note was reviewed by the author.     I have performed a physical exam and reviewed and updated ROS and Plan today (3/15/2022).     15 minutes in directly providing and coordinating care and extensive data review.  No time overlap and excludes procedures.

## 2022-03-15 NOTE — PROGRESS NOTES
Patient A+Ox4, neuro intact, left side 4/5 strength, left facial droop, right sided preferred gaze - able to cross midline    2100 - /74  2110 - Labetalol 10mg given  2130 - Headache 6/10 pain, Dr Clement notified, orders received for morphine 1mg PRN  2136 - Morphine 1mg given   2230 - BP 96/49, Dr Clement notified, orders received for 1L LR bolus. Bolus initiated

## 2022-03-15 NOTE — DISCHARGE PLANNING
[facility] Transitional Care Coordination    Referral from:  Dr. Pearce    Insurance Provider on Facesheet: MCR/ANT    Potential Rehab Diagnosis: TBD    Chart review indicates patient may have on going medical management and may have therapy needs to possibly meet inpatient rehab facility criteria with the goal of returning to community.    D/C support: TBD     Physiatry consultation forwarded per protocol.     W/U & TX pending.      Thank you for the referral.

## 2022-03-15 NOTE — THERAPY
Missed Therapy     Patient Name: Ceci Mata  Age:  71 y.o., Sex:  female  Medical Record #: [id number]  Today's Date: 3/15/2022    OT consult rec'd. Activity order starting 3/15 at 1615. Will hold and reattempt as appropriate/able once activity order begins.

## 2022-03-15 NOTE — PROGRESS NOTES
Chief Complaint   Patient presents with   • Unilateral Weakness     Left sided   • Facial Droop       Problem List Items Addressed This Visit    None     Visit Diagnoses     Acute embolic stroke (HCC)        Relevant Medications    labetalol (NORMODYNE/TRANDATE) injection 10 mg    niCARdipine (CARDENE) 25 mg in  mL Infusion    apixaban (ELIQUIS) 5mg Tab    atorvastatin (LIPITOR) tablet 80 mg    hydrALAZINE (APRESOLINE) injection 10 mg    Other Relevant Orders    Referral to Physiatry (PMR)    Referral to Neurology        Neurology Stroke Progress Note    History of present illness:  This is a 71-year old female with PMhx significant for DVT (lower extremity; currently on reported 7-day course of Eliquis; unclear compliance), also with morbid obesity, recurrent UTIs, further details are limited who presented to [facility] on 3/14/22 for a chief complaint of Left sided weakness. The patient was reportedly in her usual state of health this afternoon; at 1350, the patient's  heard the patient drop her iPad; he went to help the patient, and found her with Left UE/LE flaccidity. EMS called; on scene SBP 110s; . Stroke Pre Alert called at 1459. On arrival here at 1514, patient notably with Right MCA syndrome, NIHSS 15. Stat CT head revealed no acute intracranial abnormality; CTA head/neck with Right MCA M1 thrombus. CT perfusion study with large (121 mL) mismatch/penumnbra with no core. Given patient's concurrent use of Eliquis and indeterminate compliance/unknown dose, the decision was made to forgo IV thrombolytics and pursue only IR thrombectomy.      LKW 1350  Door time 1514  To IR at 1548    Currently patient is sitting up in stretcher; awake and alert. Speech is dysarthric, however coherent. Right gaze present, unable to cross midline. Left pasquale-neglect also present. Further ROS is limited secondary to patient's critical condition. NIHSS remains at 15.     Neurology has been consulted by  Dr. Luis Rosado to further evaluate the findings noted above.     Interval, 3/15/22  Patient is sitting up in bed; awake and alert. Still with LUE>LLE weakness, improved today; very mild Right gaze preference, dysarthria; overall improved this morning. SBP 130s. Awaiting MRI Brain this morning.     Patient is s/p IR Right MCA M1 thrombectomy with TICI2C. Etiology thus far unclear. Awaiting TTE today as well, obtain bubble study for PFO eval.     No changes to HPI as was previously documented.     Past medical history:   No past medical history on file.    Past surgical history:   No past surgical history on file.    Family history:   No family history on file.    Social history:   Social History     Socioeconomic History   • Marital status:      Spouse name: Not on file   • Number of children: Not on file   • Years of education: Not on file   • Highest education level: Not on file   Occupational History   • Not on file   Tobacco Use   • Smoking status: Never Smoker   • Smokeless tobacco: Never Used   Substance and Sexual Activity   • Alcohol use: Not on file   • Drug use: Not on file   • Sexual activity: Not on file   Other Topics Concern   • Not on file   Social History Narrative   • Not on file     Social Determinants of Health     Financial Resource Strain: Not on file   Food Insecurity: Not on file   Transportation Needs: Not on file   Physical Activity: Not on file   Stress: Not on file   Social Connections: Not on file   Intimate Partner Violence: Not on file   Housing Stability: Not on file       Current medications:   Current Facility-Administered Medications   Medication Dose   • labetalol (NORMODYNE/TRANDATE) injection 10 mg  10 mg   • niCARdipine (CARDENE) 25 mg in  mL Infusion  0-15 mg/hr   • senna-docusate (PERICOLACE or SENOKOT S) 8.6-50 MG per tablet 2 Tablet  2 Tablet    And   • polyethylene glycol/lytes (MIRALAX) PACKET 1 Packet  1 Packet    And   • magnesium hydroxide (MILK OF  MAGNESIA) suspension 30 mL  30 mL    And   • bisacodyl (DULCOLAX) suppository 10 mg  10 mg   • Respiratory Therapy Consult     • acetaminophen (Tylenol) tablet 650 mg  650 mg   • pantoprazole (Protonix) injection 40 mg  40 mg   • atorvastatin (LIPITOR) tablet 80 mg  80 mg   • hydrALAZINE (APRESOLINE) injection 10 mg  10 mg   • morphine 4 MG/ML injection 1 mg  1 mg       Medication Allergy:  Allergies   Allergen Reactions   • Sulfa Drugs      itchy mouth and eyes       Review of systems:   Constitutional: denies fever, night sweats, weight loss.   Eyes: denies acute vision change, eye pain or secretion.   Ears, Nose, Mouth, Throat: denies nasal secretion, nasal bleeding, difficulty swallowing, hearing loss, tinnitus, vertigo, ear pain, acute dental problems, oral ulcers or lesions.   Endocrine: denies recent weight changes, heat or cold intolerance, polyuria, polydypsia, polyphagia,abnormal hair growth.  Cardiovascular: denies new onset of chest pain, palpitations, syncope, or dyspnea of exertion.  Pulmonary: denies shortness of breath, new onset of cough, hemoptysis, wheezing, chest pain or flu-like symptoms.   GI: denies nausea, vomiting, diarrhea, GI bleeding, change in appetite, abdominal pain, and change in bowel habits.  : denies dysuria, urinary incontinence, hematuria.  Heme/oncology: denies history of easy bruising or bleeding. No history of cancer, DVTor PE.  Allergy/immunology: denies hives/urticaria, or itching.   Dermatologic: denies new rash, or new skin lesions.  Musculoskeletal:denies joint swelling or pain, muscle pain, neck and back pain.   Neurologic: As noted in detail above.   Psychiatric: denies symptoms of depression, anxiety, hallucinations, mood swings or changes, suicidal or homicidal thoughts.     Physical examination:   Vitals:    03/15/22 0602 03/15/22 0730 03/15/22 0745 03/15/22 0800   BP: 116/57 120/57  135/64   Pulse: 92 85 87 94   Resp: 19 19 19 19   Temp:       TempSrc:       SpO2:   96% 96% 97%   Weight:       Height:         General: Patient in no acute distress, cooperative.  HEENT: Normocephalic, no signs of acute trauma.   Neck: supple, no meningeal signs or carotid bruits. There is normal range of motion. No tenderness on exam.   Chest: clear to auscultation. No cough.   CV: RRR, no murmurs.   Skin: no signs of acute rashes or trauma.   Musculoskeletal: joints exhibit full range of motion, without any pain to palpation. There are no signs of joint or muscle swelling. There is no tenderness to deep palpation of muscles.   Psychiatric: No hallucinatory behavior. Denies symptoms of depression or suicidal ideation. Mood and affect appear normal on exam.     NEUROLOGICAL EXAM:   Mental status, orientation: Awake, alert and fully oriented.   Speech and language: speech is somewhat fluent. Mildly dysarthric, The patient is able to name, repeat and comprehend.   Cranial nerve exam: Pupils are 3-4 mm bilaterally and equally reactive to light. Visual fields are intact by confrontation. There is no nystagmus on primary or secondary gaze. Intact full EOM in all directions of gaze. Right upper/lower facial weakness present. Sensation in the face is intact to light touch. Uvula is midline. Palate elevates symmetrically. Tongue is midline and without any signs of tongue biting or fasciculations. Shoulder shrug is intact bilaterally.   Motor exam: Strength is 5/5 in RUE/RLE; 4/5 to LUE, 4+/5 LLE. Tone is otherwise normal. No abnormal movements were seen on exam.   Sensory exam reveals normal sense of light touch and pinprick in all extremities. Tactile neglect/extinction present.   Deep tendon reflexes:  Plantar responses are flexor. There is no clonus.   Coordination: shows a normal finger-nose-finger on the Right, Left unable.     Gait: Not assessed at this time as patient is currently unable.       NIH Stroke Scale    1a Level of Consciousness   1b Orientation Questions   1c Response to Commands   2  Gaze 1  3 Visual Fields   4 Facial Movement 1  5 Motor Function (arm)   a Left 2  b Right   6 Motor Function (leg)   a Left 1  b Right   7 Limb Ataxia   8 Sensory   9 Language   10 Articulation 1  11 Extinction/Inattention 1    Score: 7        ANCILLARY DATA REVIEWED:     Lab Data Review:  Recent Results (from the past 24 hour(s))   CBC WITH DIFFERENTIAL    Collection Time: 03/14/22  3:16 PM   Result Value Ref Range    WBC 10.0 4.8 - 10.8 K/uL    RBC 3.55 (L) 4.20 - 5.40 M/uL    Hemoglobin 11.7 (L) 12.0 - 16.0 g/dL    Hematocrit 35.8 (L) 37.0 - 47.0 %    .8 (H) 81.4 - 97.8 fL    MCH 33.0 27.0 - 33.0 pg    MCHC 32.7 (L) 33.6 - 35.0 g/dL    RDW 49.7 35.9 - 50.0 fL    Platelet Count 251 164 - 446 K/uL    MPV 9.8 9.0 - 12.9 fL    Neutrophils-Polys 72.80 (H) 44.00 - 72.00 %    Lymphocytes 14.40 (L) 22.00 - 41.00 %    Monocytes 10.10 0.00 - 13.40 %    Eosinophils 1.50 0.00 - 6.90 %    Basophils 0.80 0.00 - 1.80 %    Immature Granulocytes 0.40 0.00 - 0.90 %    Nucleated RBC 0.00 /100 WBC    Neutrophils (Absolute) 7.25 (H) 2.00 - 7.15 K/uL    Lymphs (Absolute) 1.43 1.00 - 4.80 K/uL    Monos (Absolute) 1.00 (H) 0.00 - 0.85 K/uL    Eos (Absolute) 0.15 0.00 - 0.51 K/uL    Baso (Absolute) 0.08 0.00 - 0.12 K/uL    Immature Granulocytes (abs) 0.04 0.00 - 0.11 K/uL    NRBC (Absolute) 0.00 K/uL   COMP METABOLIC PANEL    Collection Time: 03/14/22  3:16 PM   Result Value Ref Range    Sodium 137 135 - 145 mmol/L    Potassium 4.6 3.6 - 5.5 mmol/L    Chloride 100 96 - 112 mmol/L    Co2 25 20 - 33 mmol/L    Anion Gap 12.0 7.0 - 16.0    Glucose 118 (H) 65 - 99 mg/dL    Bun 13 8 - 22 mg/dL    Creatinine 0.88 0.50 - 1.40 mg/dL    Calcium 9.4 8.5 - 10.5 mg/dL    AST(SGOT) 70 (H) 12 - 45 U/L    ALT(SGPT) 39 2 - 50 U/L    Alkaline Phosphatase 231 (H) 30 - 99 U/L    Total Bilirubin 1.2 0.1 - 1.5 mg/dL    Albumin 3.7 3.2 - 4.9 g/dL    Total Protein 7.1 6.0 - 8.2 g/dL    Globulin 3.4 1.9 - 3.5 g/dL    A-G Ratio 1.1 g/dL   PROTHROMBIN TIME     Collection Time: 22  3:16 PM   Result Value Ref Range    PT 16.8 (H) 12.0 - 14.6 sec    INR 1.41 (H) 0.87 - 1.13   APTT    Collection Time: 22  3:16 PM   Result Value Ref Range    APTT 30.2 24.7 - 36.0 sec   COD (ADULT)    Collection Time: 22  3:16 PM   Result Value Ref Range    ABO Grouping Only O     Rh Grouping Only POS     Antibody Screen-Cod NEG    TROPONIN    Collection Time: 22  3:16 PM   Result Value Ref Range    Troponin T 51 (H) 6 - 19 ng/L   ESTIMATED GFR    Collection Time: 22  3:16 PM   Result Value Ref Range    GFR If African American >60 >60 mL/min/1.73 m 2    GFR If Non African American >60 >60 mL/min/1.73 m 2   Hemoglobin A1C    Collection Time: 22  3:16 PM   Result Value Ref Range    Glycohemoglobin 4.3 4.0 - 5.6 %    Est Avg Glucose 77 mg/dL   VITAMIN B12    Collection Time: 22  3:16 PM   Result Value Ref Range    Vitamin B12 -True Cobalamin 3515 (H) 211 - 911 pg/mL   TSH WITH REFLEX TO FT4    Collection Time: 22  3:16 PM   Result Value Ref Range    TSH 2.530 0.380 - 5.330 uIU/mL   FOLATE    Collection Time: 22  3:16 PM   Result Value Ref Range    Folate -Folic Acid 12.8 >4.0 ng/mL   EKG (NOW)    Collection Time: 22  6:17 PM   Result Value Ref Range    Report       [facility]    Test Date:  2022  Pt Name:    MARILYN GREEN             Department: ER  MRN:        [id number]                      Room:       [facility]  Gender:     Female                       Technician: [facility]  :        [id number]                   Requested By:JHNOY LONDONO  Order #:    [id number]                    New MD: Bienvenido Rae MD    Measurements  Intervals                                Axis  Rate:       91                           P:          80  LA:         164                          QRS:        69  QRSD:       90                           T:          50  QT:         372  QTc:        458    Interpretive Statements  SINUS RHYTHM  ATRIAL  PREMATURE COMPLEX  Compared to ECG 10/02/2020 14:06:13  Atrial premature complex(es) now present  Electronically Signed On 3- 23:27:13 PDT by Bienvenido Rae MD     ABO Rh Confirm    Collection Time: 03/15/22  4:35 AM   Result Value Ref Range    ABO Rh Confirm O POS    Lipid Panel    Collection Time: 03/15/22  4:35 AM   Result Value Ref Range    Cholesterol,Tot 156 100 - 199 mg/dL    Triglycerides 125 0 - 149 mg/dL    HDL 31 (A) >=40 mg/dL     (H) <100 mg/dL   CBC with Differential    Collection Time: 03/15/22  4:35 AM   Result Value Ref Range    WBC 12.0 (H) 4.8 - 10.8 K/uL    RBC 2.72 (L) 4.20 - 5.40 M/uL    Hemoglobin 9.0 (L) 12.0 - 16.0 g/dL    Hematocrit 28.6 (L) 37.0 - 47.0 %    .1 (H) 81.4 - 97.8 fL    MCH 33.1 (H) 27.0 - 33.0 pg    MCHC 31.5 (L) 33.6 - 35.0 g/dL    RDW 51.5 (H) 35.9 - 50.0 fL    Platelet Count 223 164 - 446 K/uL    MPV 10.0 9.0 - 12.9 fL    Neutrophils-Polys 69.50 44.00 - 72.00 %    Lymphocytes 18.20 (L) 22.00 - 41.00 %    Monocytes 10.50 0.00 - 13.40 %    Eosinophils 0.50 0.00 - 6.90 %    Basophils 0.80 0.00 - 1.80 %    Immature Granulocytes 0.50 0.00 - 0.90 %    Nucleated RBC 0.00 /100 WBC    Neutrophils (Absolute) 8.31 (H) 2.00 - 7.15 K/uL    Lymphs (Absolute) 2.17 1.00 - 4.80 K/uL    Monos (Absolute) 1.25 (H) 0.00 - 0.85 K/uL    Eos (Absolute) 0.06 0.00 - 0.51 K/uL    Baso (Absolute) 0.10 0.00 - 0.12 K/uL    Immature Granulocytes (abs) 0.06 0.00 - 0.11 K/uL    NRBC (Absolute) 0.00 K/uL   Comp Metabolic Panel (CMP)    Collection Time: 03/15/22  4:35 AM   Result Value Ref Range    Sodium 138 135 - 145 mmol/L    Potassium 4.5 3.6 - 5.5 mmol/L    Chloride 105 96 - 112 mmol/L    Co2 21 20 - 33 mmol/L    Anion Gap 12.0 7.0 - 16.0    Glucose 119 (H) 65 - 99 mg/dL    Bun 12 8 - 22 mg/dL    Creatinine 0.69 0.50 - 1.40 mg/dL    Calcium 8.3 (L) 8.5 - 10.5 mg/dL    AST(SGOT) 55 (H) 12 - 45 U/L    ALT(SGPT) 29 2 - 50 U/L    Alkaline Phosphatase 176 (H) 30 - 99 U/L    Total  Bilirubin 0.7 0.1 - 1.5 mg/dL    Albumin 3.0 (L) 3.2 - 4.9 g/dL    Total Protein 5.7 (L) 6.0 - 8.2 g/dL    Globulin 2.7 1.9 - 3.5 g/dL    A-G Ratio 1.1 g/dL   ESTIMATED GFR    Collection Time: 03/15/22  4:35 AM   Result Value Ref Range    GFR (CKD-EPI) 92 >60 mL/min/1.73 m 2       Labs reviewed by me.       Imaging reviewed by me:     DX-CHEST-PORTABLE (1 VIEW)   Final Result      1.  No acute cardiac or pulmonary abnormalities are identified.      IR-THROMBO MECHANICAL ARTERY,INIT   Final Result         71-year-old patient who presented with acute onset of left-sided weakness and facial droop with an NIH stroke scale of 15 underwent emergent mechanical thrombectomy for an ICA terminus occlusion on the right side. The final angiographic images    demonstrate complete recanalization of the ICA, MCA on the right side with minimal distal embolization to a cortical M4 branch each in the superior and in the inferior division.      Final recanalization score: TICI 2C      I, Abdirahman Zhu was physically present and participated during the entire procedure of the IR-THROMBO MECHANICAL ARTERY,INIT.                  CT-CEREBRAL PERFUSION ANALYSIS   Final Result      1.  Cerebral blood flow less than 30% likely representing completed infarct = 0 mL.      2.  T Max more than 6 seconds likely representing combination of completed infarct and ischemia = 121 mL.      3.  Mismatched volume likely representing ischemic brain/penumbra = infinite      4.  Please note that the cerebral perfusion was performed on the limited brain tissue around the basal ganglia region. Infarct/ischemia outside the CT perfusion sections can be missed in this study.      CT-CTA NECK WITH & W/O-POST PROCESSING   Final Result      1.  Mild bilateral atherosclerosis with less than 50% ICA stenosis.   2.  There is a nonspecific 4 mm posterior right upper lobe lung nodule.      Fleischner Society pulmonary nodule recommendations:   Low Risk: No routine  follow-up      High Risk: Optional CT at 12 months      Comments: Nodules less than 6 mm do not require routine follow-up, but certain patients at high risk with suspicious nodule morphology, upper lobe location, or both may warrant 12-month follow-up.      Low Risk - Minimal or absent history of smoking and of other known risk factors.      High Risk - History of smoking or of other known risk factors.      Note: These recommendations do not apply to lung cancer screening, patients with immunosuppression, or patients with known primary cancer.      Fleischner Society 2017 Guidelines for Management of Incidentally Detected Pulmonary Nodules in Adults         CT-CTA HEAD WITH & W/O-POST PROCESS   Final Result      1.  There is a right M1 occlusion. Findings were discussed with Narcisa Chapman by Dr. Zhu the neurointerventional radiologist who has already seen the exam on 3/14/2022 at 3:35 PM.      CT-HEAD W/O   Final Result      1.  There are periventricular and subcortical white matter changes present.  This finding is nonspecific and could be from indeterminate age small vessel ischemia, demyelination, or gliosis. MRI would be more sensitive for further evaluation.   2.  Mild paranasal sinus disease.            EC-ECHOCARDIOGRAM COMPLETE W/O CONT    (Results Pending)   MR-BRAIN-W/O    (Results Pending)   CT-HEAD W/O    (Results Pending)       Presumed mechanism by TOAST:  __Large Artery Atherosclerosis  __Small Vessel (Lacunar)  __Cardioembolic  __Other (Sickle Cell, Vasculitis, Hypercoagulable)  _X_Unknown    Modified Sodus Scale (MRS): 0 = No symptoms      ASSESSMENT AND PLAN:  71-year old female with PMhx significant for DVT (lower extremity; currently on reported 7-day course of Eliquis; unclear compliance), also with morbid obesity, recurrent UTIs, further details are limited who presented to [facility] on 3/14/22 for a chief complaint of Left sided weakness, onset 1350 om 3/14/22. On arrival here,  stat CT head revealed no acute intracranial abnormality; CTA head/neck with Right MCA M1 thrombus. CT perfusion study with large (121 mL) mismatch/penumbra with no core. Given patient's concurrent use of Eliquis and indeterminate compliance/unknown dose, the decision was made to forgo IV thrombolytics and pursue only IR thrombectomy. Patient now s/p IR Right MCA thrombectomy per Dr. Hawley with TICI 2; NIHSS 15-->7 today; MRI Brain wo contrast has revealed acute ischemic infarct involving Right temporal region/R MCA territory with mild/moderate hemorrhagic conversion. Plan to hold antithrombotics/anticoagulation x 3-5 days.     Recommendations/Plan:     -q4h and PRN neuro assessment. VS per nursing/unit protocol. BP goal is normotension,100-130/60-80 (*please keep SBP < 140 given hemorrhagic conversion). Antihypertensives per ICU team.    -Telemetry; currently SR. Screen for Afib/arrhythmia. Obtain TTE (with bubble study, given recent DVTs of unclear etiology)-- ordered and pending.   -Atorvastatin 80 mg PO q HS. Note LDL is 100, goal < 70.   -Recommend aggressive BG management per primary team. Avoid IVF with Dextrose. BG goal 140-180. hemoglobin A1c is 4.3.   -PT/OT/SLP eval and treat. Physiatry consult.   -Will follow up with results of the above and make additional recommendations accordingly. All other medical management per primary/ICU team.   -DVT PPX: SCDs.      The plan of care above has been discussed with Dr. Drew Monteiro. Please call with questions.     KIRIT ButcherRCARLITOS.  [facility]

## 2022-03-15 NOTE — ASSESSMENT & PLAN NOTE
IV pantoprazole daily while n.p.o.  Continue omeprazole-home medication once cleared by speech/swallow

## 2022-03-16 PROBLEM — I82.409 DVT (DEEP VENOUS THROMBOSIS) (HCC): Status: ACTIVE | Noted: 2022-01-01

## 2022-03-16 PROBLEM — E66.9 OBESITY: Status: ACTIVE | Noted: 2022-01-01

## 2022-03-16 PROBLEM — R16.0 LIVER MASS: Status: ACTIVE | Noted: 2022-01-01

## 2022-03-16 NOTE — DISCHARGE PLANNING
S/p IR Right MCA M1 thrombectomy, pending tx evals when appropriate. Physiatry to consult, TCC will follow.

## 2022-03-16 NOTE — ASSESSMENT & PLAN NOTE
Status post right M1 thrombectomy  With mild to moderate ICH on MRI  Blood pressure control goal -130  PT/OT/SLP  Neurology recommending tostart eliquis on 3/22  Continue atorvastatin    SNF pending tolerance of eliquis  Neurology recommended Zio patch on discharge

## 2022-03-16 NOTE — CONSULTS
Hospital Medicine Consultation    Date of Service  3/16/2022    Referring Physician  Dewayne Giordano M.D.    Consulting Physician  Giovanni Talbert M.D.    Reason for Consultation  Take over medical care    History of Presenting Illness  71 y.o. female who presented 3/14/2022 with recent history of DVT who has been on Eliquis PSVT and GERD presented to the emergency department for evaluation of left-sided weakness and facial droop.  She was evaluated by neurology and her initial work-up revealed a right M1 occlusion.  She was not a candidate for lytic therapy due to anticoagulation and she underwent thrombectomy by interventional radiology TICI 2C and was admitted to the intensive care unit.  Follow-up MRI revealed acute ischemic infarct in the right temporal region right MCA territory with mild to moderate hemorrhagic conversion.  She was also noted to have a pulmonary nodule on her CT of the neck and concern for possible liver mass incidentally noted on echocardiogram.  Patient does not have any history of malignancy.  On my examination she is lethargic but arousable and answer questions appropriately.  He is complaining of a mild dull headache.  She has left-sided weakness.  She denies any chest pain or dyspnea.  She is afebrile.    Review of Systems  Review of Systems   All other systems reviewed and are negative.      Past Medical History  Hypertension DVT GERD PSVT  Surgical History  Reviewed and not pertinent to the presenting problem  Family History  Father had pancreatic cancer  Social History   reports that she has never smoked. She has never used smokeless tobacco.  No alcohol or drug use    Medications  Prior to Admission Medications   Prescriptions Last Dose Informant Patient Reported? Taking?   apixaban (ELIQUIS) 5mg Tab UNK at [facility] Yes Yes   Sig: Take 5-10 mg by mouth every day. Take 10 mg daily for 7 days, then 5 mg daily (directions per [facility] pharmacy)   cephALEXin (KEFLEX)  500 MG Cap   Yes No   Sig: take 2 capsules by mouth every 12 hours for 10 days   metoprolol (LOPRESSOR) 25 MG Tab   Yes No   Sig: Take 25 mg by mouth. TWICE A DAY WITH FOOD   omeprazole (PRILOSEC) 20 MG delayed-release capsule   Yes No   Sig: take 1 tablet by mouth once daily 30 MINUTES BEFORE BREAKFAST      Facility-Administered Medications: None       Allergies  Allergies   Allergen Reactions   • Sulfa Drugs      itchy mouth and eyes       Physical Exam  Temp:  [36.7 °C (98 °F)-36.9 °C (98.4 °F)] 36.8 °C (98.3 °F)  Pulse:  [] 86  Resp:  [13-24] 18  BP: (106-135)/(52-69) 109/52  SpO2:  [92 %-99 %] 95 %    Physical Exam  Vitals and nursing note reviewed.   Constitutional:       General: She is not in acute distress.     Comments: Lethargic arousable and answers questions appropriately   HENT:      Head: Normocephalic and atraumatic.      Nose: Nose normal. No rhinorrhea.      Mouth/Throat:      Pharynx: No oropharyngeal exudate or posterior oropharyngeal erythema.   Eyes:      General: No scleral icterus.        Right eye: No discharge.         Left eye: No discharge.   Cardiovascular:      Rate and Rhythm: Normal rate and regular rhythm.      Heart sounds: Normal heart sounds. No murmur heard.    No friction rub. No gallop.   Pulmonary:      Effort: Pulmonary effort is normal. No respiratory distress.      Breath sounds: Normal breath sounds. No stridor. No wheezing, rhonchi or rales.   Chest:      Chest wall: No tenderness.   Abdominal:      General: Bowel sounds are normal. There is no distension.      Palpations: Abdomen is soft. There is no mass.      Tenderness: There is no abdominal tenderness. There is no rebound.   Musculoskeletal:         General: No swelling or tenderness.      Cervical back: Neck supple. No rigidity.   Skin:     General: Skin is warm and dry.      Coloration: Skin is not cyanotic or jaundiced.      Nails: There is no clubbing.   Neurological:      General: No focal deficit present.       Mental Status: She is oriented to person, place, and time.      Cranial Nerves: Cranial nerve deficit (left facial droop) present.      Motor: Weakness (left hemiparesis 4/5) present.   Psychiatric:         Mood and Affect: Mood normal.         Behavior: Behavior normal.         Fluids  Date 03/16/22 0700 - 03/17/22 0659   Shift 0239-5144 9387-5818 5328-7164 24 Hour Total   INTAKE   Shift Total       OUTPUT   Urine 100   100   Stool 0   0   Shift Total 100   100   Weight (kg) 89.8 89.8 89.8 89.8       Laboratory  Recent Labs     03/14/22  1516 03/15/22  0435 03/16/22  0341   WBC 10.0 12.0* 11.5*   RBC 3.55* 2.72* 2.54*   HEMOGLOBIN 11.7* 9.0* 8.3*   HEMATOCRIT 35.8* 28.6* 26.4*   .8* 105.1* 103.9*   MCH 33.0 33.1* 32.7   MCHC 32.7* 31.5* 31.4*   RDW 49.7 51.5* 51.2*   PLATELETCT 251 223 179   MPV 9.8 10.0 10.4     Recent Labs     03/14/22  1516 03/15/22  0435 03/16/22  0341   SODIUM 137 138 135   POTASSIUM 4.6 4.5 4.3   CHLORIDE 100 105 104   CO2 25 21 23   GLUCOSE 118* 119* 114*   BUN 13 12 17   CREATININE 0.88 0.69 0.78   CALCIUM 9.4 8.3* 8.1*     Recent Labs     03/14/22  1516   APTT 30.2   INR 1.41*          Recent Labs     03/15/22  0435   TRIGLYCERIDE 125   HDL 31*   *        Imaging  EC-ECHOCARDIOGRAM COMPLETE W/O CONT   Final Result      MR-BRAIN-W/O   Final Result         Acute infarct in the right insula/basal ganglia/right caudate region.      Punctate foci of acute infarction involving the right frontoparietal cortex and subcortical region.      Multiple foci of acute infarction involving the bilateral inferior cerebellum.      Focal area of parenchymal hemorrhage in the right insular region with minimal surrounding edema and slight mass effect upon the right lateral ventricle.      Minimal subarachnoid hemorrhage in the sulci in the right temporoparietal region.      DX-CHEST-PORTABLE (1 VIEW)   Final Result      1.  No acute cardiac or pulmonary abnormalities are identified.       IR-THROMBO MECHANICAL ARTERY,INIT   Final Result         71-year-old patient who presented with acute onset of left-sided weakness and facial droop with an NIH stroke scale of 15 underwent emergent mechanical thrombectomy for an ICA terminus occlusion on the right side. The final angiographic images    demonstrate complete recanalization of the ICA, MCA on the right side with minimal distal embolization to a cortical M4 branch each in the superior and in the inferior division.      Final recanalization score: TICI 2C      I, Abdirahman Zhu was physically present and participated during the entire procedure of the IR-THROMBO MECHANICAL ARTERY,INIT.                  CT-CEREBRAL PERFUSION ANALYSIS   Final Result      1.  Cerebral blood flow less than 30% likely representing completed infarct = 0 mL.      2.  T Max more than 6 seconds likely representing combination of completed infarct and ischemia = 121 mL.      3.  Mismatched volume likely representing ischemic brain/penumbra = infinite      4.  Please note that the cerebral perfusion was performed on the limited brain tissue around the basal ganglia region. Infarct/ischemia outside the CT perfusion sections can be missed in this study.      CT-CTA NECK WITH & W/O-POST PROCESSING   Final Result      1.  Mild bilateral atherosclerosis with less than 50% ICA stenosis.   2.  There is a nonspecific 4 mm posterior right upper lobe lung nodule.      Fleischner Society pulmonary nodule recommendations:   Low Risk: No routine follow-up      High Risk: Optional CT at 12 months      Comments: Nodules less than 6 mm do not require routine follow-up, but certain patients at high risk with suspicious nodule morphology, upper lobe location, or both may warrant 12-month follow-up.      Low Risk - Minimal or absent history of smoking and of other known risk factors.      High Risk - History of smoking or of other known risk factors.      Note: These recommendations do not  apply to lung cancer screening, patients with immunosuppression, or patients with known primary cancer.      Fleischner Society 2017 Guidelines for Management of Incidentally Detected Pulmonary Nodules in Adults         CT-CTA HEAD WITH & W/O-POST PROCESS   Final Result      1.  There is a right M1 occlusion. Findings were discussed with Narcisa Chapman by Dr. Zhu the neurointerventional radiologist who has already seen the exam on 3/14/2022 at 3:35 PM.      CT-HEAD W/O   Final Result      1.  There are periventricular and subcortical white matter changes present.  This finding is nonspecific and could be from indeterminate age small vessel ischemia, demyelination, or gliosis. MRI would be more sensitive for further evaluation.   2.  Mild paranasal sinus disease.            CT-CHEST,ABDOMEN,PELVIS WITH    (Results Pending)       Assessment/Plan  * Stroke (cerebrum) ([facility])  Assessment & Plan  Status post right M1 thrombectomy  With mild to moderate ICH on MRI  Blood pressure control goal -130  PT/OT/SLP  Neurology recommending to hold antiplatelet/antithrombotics through 3/21/2022  Physiatry referral  Continue atorvastatin    Obesity  Assessment & Plan  Body mass index is 31.95 kg/m².     DVT (deep venous thrombosis) ([facility])  Assessment & Plan  Recent DVT was maintained on Eliquis  Given ICH neurology recommending holding antiplatelet/anticoagulant for 5-7 days    Liver mass  Assessment & Plan  Follow-up on ordered CT chest abdomen and pelvis    Anemia- (present on admission)  Assessment & Plan  No clinical signs of acute bleeding monitor CBC    Paroxysmal SVT (supraventricular tachycardia) ([facility])  Assessment & Plan  Continue metoprolol    GERD (gastroesophageal reflux disease)- (present on admission)  Assessment & Plan  Continue Prilosec    Lung nodule < 6cm on CT- (present on admission)  Assessment & Plan  Reviewed findings with patient and discussed need for outpatient follow-up

## 2022-03-16 NOTE — ASSESSMENT & PLAN NOTE
CT findings concerning for metastatic pancreatic cancer  Discussed need for further work-up with biopsy and oncology referral once recovered from this acute hospitalization  Referral for intake oncology coordinator ordered  Discussed again CT findings and plan of care with outpatient further work-up with patient and  and daughter at the bedside

## 2022-03-16 NOTE — CONSULTS
Physical Medicine and Rehabilitation Consultation          Date of initial consultation: 3/16/2022  Consulting provider: Vilma Pearce MD  Reason for consultation: assess for acute inpatient rehab appropriateness  LOS: 2 Day(s)    Chief complaint: Right MCA stroke    HPI: The patient is a 71 y.o. right hand dominant female with a past medical history of lower extremity DVT on Eliquis, morbid obesity, recurrent UTIs;  who presented on 3/14/2022  3:14 PM with acute onset left-sided weakness.  Has been activated EMS.  Patient presented to [facility] with right MCA syndrome, NIH score 15.  CT head showed no acute intracranial abnormality, CTA head/neck found right MCA M1 thrombus, CT perfusion found large 121 mL mismatch.  Since patient was on Eliquis TPA was contraindicated.  Patient did receive IR thrombectomy with TICI 2C reperfusion achieved.     The patient currently reports lethargy, some shortness of breath currently on 2 L nasal cannula in house but not at home, headache.  Patient is joined in the room by her spouse who is very supportive.  He states he is willing to build a ramp at their house.  Spouse states that their son is also home for a couple of weeks to help out, he is very handy as well, builds sets for movies in [employer]. NIH score improved to 7 today.     ROS  Pertinent positives are mentioned in the HPI, all others reviewed and are negative.    Social Hx:  1 SH  0 DIONNA  With: spouse. They [place] in [place]    Employment: Retired 3 months ago    THERAPY:  Restrictions: Swallow precautions, fall risks  PT: Functional mobility   Pending    OT: ADLs  3/16: Max assist LBD and toileting    SLP:   3/15: Minced and moist textures with thin liquids  3/16: Downgrade diet to PU4/TN0    IMAGING:  MR brain 3/15/2022    Acute infarct in the right insula/basal ganglia/right caudate region.     Punctate foci of acute infarction involving the right frontoparietal cortex and subcortical  "region.     Multiple foci of acute infarction involving the bilateral inferior cerebellum.     Focal area of parenchymal hemorrhage in the right insular region with minimal surrounding edema and slight mass effect upon the right lateral ventricle.     Minimal subarachnoid hemorrhage in the sulci in the right temporoparietal region.    PROCEDURES:  3/14/2022  Abdirahman Zhu M.D.  IR thrombectomy.  Cerebral Angiogram showed a right M1 occlusion. TICI 2C recanalization achieved      PMH:  No past medical history on file.    PSH:  No past surgical history on file.    FHX:  Non-pertinent to today's issues    Medications:  Current Facility-Administered Medications   Medication Dose   • omeprazole (FIRST-OMEPRAZOLE) 2 mg/mL oral susp 40 mg  40 mg   • metoprolol tartrate (LOPRESSOR) tablet 25 mg  25 mg   • labetalol (NORMODYNE/TRANDATE) injection 10 mg  10 mg   • niCARdipine (CARDENE) 25 mg in  mL Infusion  0-15 mg/hr   • senna-docusate (PERICOLACE or SENOKOT S) 8.6-50 MG per tablet 2 Tablet  2 Tablet    And   • polyethylene glycol/lytes (MIRALAX) PACKET 1 Packet  1 Packet    And   • magnesium hydroxide (MILK OF MAGNESIA) suspension 30 mL  30 mL    And   • bisacodyl (DULCOLAX) suppository 10 mg  10 mg   • Respiratory Therapy Consult     • acetaminophen (Tylenol) tablet 650 mg  650 mg   • atorvastatin (LIPITOR) tablet 80 mg  80 mg   • hydrALAZINE (APRESOLINE) injection 10 mg  10 mg   • morphine 4 MG/ML injection 1 mg  1 mg       Allergies:  Allergies   Allergen Reactions   • Sulfa Drugs      itchy mouth and eyes         Physical Exam:  Vitals: /53   Pulse 88   Temp 36.7 °C (98 °F) (Temporal)   Resp 19   Ht 1.676 m (5' 6\")   Wt 89.8 kg (197 lb 15.6 oz)   SpO2 92%   Gen: NAD  Head: NC/AT, left facial droop  Eyes/ Nose/ Mouth:  moist mucous membranes,  Tongue protrudes midline  Cardio: RRR, good distal perfusion, warm extremities  Pulm: normal respiratory effort, no cyanosis   Abd: Soft NTND, negative " borborygmi   Ext: No peripheral edema. No calf tenderness. No clubbing.    Mental status: answers questions appropriately follows commands  Speech: fluent, no aphasia or dysarthria    CRANIAL NERVES:  2,3: visual acuity grossly intact, PERRL  3,4,6: EOMI bilaterally, no nystagmus or diplopia  5: sensation intact to light touch bilaterally and symmetric  7: Left facial droop   8: hearing grossly intact  9,10: symmetric palate elevation  11: SCM/Trapezius strength 5/5 right, 0 out of 5 left  12: tongue protrudes midline      Motor:      Upper Extremity  Myotome R L   Shoulder flexion C5 5 0/5   Elbow flexion C5 5 4/5   Wrist extension C6 5 4/5   Elbow extension C7 5 4/5   Finger flexion C8 5 4/5   Finger abduction T1 5 3/5     Lower Extremity Myotome R L   Hip flexion L2 4/5 2/5   Knee extension L3 4/5 4/5   Ankle dorsiflexion L4 5 4/5   Toe extension L5 5 4/5   Ankle plantarflexion S1 5 4/5     Sensory:   intact to light touch through out    Labs: Reviewed and significant for   Recent Labs     03/14/22  1516 03/15/22  0435 03/16/22  0341   RBC 3.55* 2.72* 2.54*   HEMOGLOBIN 11.7* 9.0* 8.3*   HEMATOCRIT 35.8* 28.6* 26.4*   PLATELETCT 251 223 179   PROTHROMBTM 16.8*  --   --    APTT 30.2  --   --    INR 1.41*  --   --      Recent Labs     03/14/22  1516 03/15/22  0435 03/16/22  0341   SODIUM 137 138 135   POTASSIUM 4.6 4.5 4.3   CHLORIDE 100 105 104   CO2 25 21 23   GLUCOSE 118* 119* 114*   BUN 13 12 17   CREATININE 0.88 0.69 0.78   CALCIUM 9.4 8.3* 8.1*     Recent Results (from the past 24 hour(s))   EC-ECHOCARDIOGRAM COMPLETE W/O CONT    Collection Time: 03/15/22  2:38 PM   Result Value Ref Range    Eject.Frac. MOD BP 64.16     Eject.Frac. MOD 4C 71.45     Eject.Frac. MOD 2C 59.83     Left Ventrical Ejection Fraction 60    CBC WITH DIFFERENTIAL    Collection Time: 03/16/22  3:41 AM   Result Value Ref Range    WBC 11.5 (H) 4.8 - 10.8 K/uL    RBC 2.54 (L) 4.20 - 5.40 M/uL    Hemoglobin 8.3 (L) 12.0 - 16.0 g/dL     Hematocrit 26.4 (L) 37.0 - 47.0 %    .9 (H) 81.4 - 97.8 fL    MCH 32.7 27.0 - 33.0 pg    MCHC 31.4 (L) 33.6 - 35.0 g/dL    RDW 51.2 (H) 35.9 - 50.0 fL    Platelet Count 179 164 - 446 K/uL    MPV 10.4 9.0 - 12.9 fL    Neutrophils-Polys 71.10 44.00 - 72.00 %    Lymphocytes 16.30 (L) 22.00 - 41.00 %    Monocytes 9.90 0.00 - 13.40 %    Eosinophils 1.60 0.00 - 6.90 %    Basophils 0.70 0.00 - 1.80 %    Immature Granulocytes 0.40 0.00 - 0.90 %    Nucleated RBC 0.00 /100 WBC    Neutrophils (Absolute) 8.17 (H) 2.00 - 7.15 K/uL    Lymphs (Absolute) 1.87 1.00 - 4.80 K/uL    Monos (Absolute) 1.14 (H) 0.00 - 0.85 K/uL    Eos (Absolute) 0.18 0.00 - 0.51 K/uL    Baso (Absolute) 0.08 0.00 - 0.12 K/uL    Immature Granulocytes (abs) 0.05 0.00 - 0.11 K/uL    NRBC (Absolute) 0.00 K/uL   Basic Metabolic Panel    Collection Time: 03/16/22  3:41 AM   Result Value Ref Range    Sodium 135 135 - 145 mmol/L    Potassium 4.3 3.6 - 5.5 mmol/L    Chloride 104 96 - 112 mmol/L    Co2 23 20 - 33 mmol/L    Glucose 114 (H) 65 - 99 mg/dL    Bun 17 8 - 22 mg/dL    Creatinine 0.78 0.50 - 1.40 mg/dL    Calcium 8.1 (L) 8.5 - 10.5 mg/dL    Anion Gap 8.0 7.0 - 16.0   RETICULOCYTES COUNT    Collection Time: 03/16/22  3:41 AM   Result Value Ref Range    Reticulocyte Count 4.4 (H) 0.8 - 2.1 %    Retic, Absolute 0.11 (H) 0.04 - 0.06 M/uL    Imm. Reticulocyte Fraction 22.3 (H) 9.3 - 17.4 %    Retic Hgb Equivalent 35.8 (H) 29.0 - 35.0 pg/cell   HEPATIC FUNCTION PANEL    Collection Time: 03/16/22  3:41 AM   Result Value Ref Range    Alkaline Phosphatase 171 (H) 30 - 99 U/L    AST(SGOT) 47 (H) 12 - 45 U/L    ALT(SGPT) 23 2 - 50 U/L    Total Bilirubin 0.8 0.1 - 1.5 mg/dL    Direct Bilirubin <0.2 0.1 - 0.5 mg/dL    Indirect Bilirubin see below 0.0 - 1.0 mg/dL    Albumin 2.9 (L) 3.2 - 4.9 g/dL    Total Protein 5.7 (L) 6.0 - 8.2 g/dL   MAGNESIUM    Collection Time: 03/16/22  3:41 AM   Result Value Ref Range    Magnesium 2.1 1.5 - 2.5 mg/dL   ESTIMATED GFR     Collection Time: 03/16/22  3:41 AM   Result Value Ref Range    GFR (CKD-EPI) 81 >60 mL/min/1.73 m 2         ASSESSMENT:  Patient is a 71 y.o. female admitted with right MCA syndrome, status post thrombectomy with TICI 2C reperfusion    [facility] Code / Diagnosis to Support: 0001.1 - Stroke: Left Body Involvement (Right Brain)    Rehabilitation: Impaired ADLs and mobility  Patient is a good candidate for inpatient rehab based on needs for OT, and speech therapy.  Patient will also benefit from family training.  Patient has a good discharge situation which will be home with spouse.     Barriers to transfer include: Insurance authorization, TCCs to verify disposition, medical clearance and bed availability     All cases are subject to administrative review and recommendations may change    Additional Recommendations:  -Awaiting PT evaluation.  Patient is obese, with weakness in the bilateral lower extremities and left upper extremity.   is willing to build a ramp at home which would increase her chances of a successful discharge home, and was recommended to  while I was in the room today.  I suspect patient will do well with inpatient rehab.  She has good support from her  and son.  -TCC to confirm support   -Awaiting PT evaluation, continue OT and SLP while in-house  -Patient will be a good candidate for IPR if she is able to stand at mod assist or better.  She is currently requiring max assist for dressing, which has been can help with.  I prepared the family to prepare for a wheelchair discharge, however it is reasonable to assume that with hard work she could discharge home with a walker  -Antithrombotic/anticoagulation on hold until 3/21/2022.  Patient will need repeat CT head, then neurology may restart anticoagulation.  -Secondary stroke prevention with atorvastatin 80 mg nightly, aggressive blood glucose management, and blood pressure management  -Patient may benefit from low-dose amantadine for  neuro stimulation for her lethargy if it persists.  -Awaiting CT chest abdomen pelvis, ordered to rule out underlying malignancy due to findings of multiple vascularized nodules involving the liver with elevated LFTs  -PMR to follow in the periphery for rehab appropriateness, please reach out with questions or request for medical management    Medical Complexity:  Stroke  Leukocytosis  Anemia    DVT PPX: SCDs      Thank you for allowing us to participate in the care of this patient.     Patient was seen for 113 minutes on unit/floor of which > 50% of time was spent on counseling and coordination of care regarding the above, including prognosis, risk reduction, benefits of treatment, and options for next stage of care.    Seferino Bear, DO   Physical Medicine and Rehabilitation     Please note that this dictation was created using voice recognition software. I have made every reasonable attempt to correct obvious errors, but there may be errors of grammar and possibly content that I did not discover before finalizing the note.

## 2022-03-16 NOTE — PROGRESS NOTES
Patient no longer requires critical care management therefore she will be transferred to the hospitalist service and transferred out of ICU.  I have discussed patient case with the hospitalist Dr. Arabella Talbert who has agreed to see her this am.    I did inform pt and pt family of incidental lung and liver nodule findings that will require further work up.  I  Disussed these findings with Dr. Arabella Talbert as well.  Neurology will be following patient on the floor.

## 2022-03-16 NOTE — THERAPY
"Occupational Therapy   Initial Evaluation     Patient Name: Ceci Mata  Age:  71 y.o., Sex:  female  Medical Record #: [id number]  Today's Date: 3/16/2022     Precautions: Fall Risk,Swallow Precautions ( See Comments)  Comments: L inattention    Assessment  Patient is 71 y.o. female admitted for stroke symptoms. PMhx: Previously independent living in [facility] w/her SO, dx w/previous DVT, PSVT, obesity, and GERD. This admission pt is dx w/Acute ischemic infarct of R temporal region w/mild to moderate hemorrhagic conversion, pt is s/p R MCA thrombectomy, and was also found to have incidental findings of lung and liver masses. At present pt is demonstrating L side weakness, impaired motor control of LUE, impaired visual attention to L impaired postural control and overall balance and mobility impacting her participate in ADL's. Pt is highly motivated for recovery.     Plan  Recommend Occupational Therapy 4 times per week until therapy goals are met for the following treatments:  Adaptive Equipment, Cognitive Skill Development, Neuro Re-Education / Balance, Self Care/Activities of Daily Living, Therapeutic Activities and Therapeutic Exercises.       Discharge Recommendations: Recommend post-acute placement for additional occupational therapy services prior to discharge home   Subjective    \"I want to get better\"      Objective     03/16/22 1020   Total Time Spent   Total Time Spent (Mins) 35   Charge Group   OT Evaluation OT Evaluation Mod  (20 min)   OT Self Care / ADL 1  (15min)   Initial Contact Note    Initial Contact Note Order Received and Verified, Occupational Therapy Evaluation in Progress with Full Report to Follow.   Prior Living Situation   Prior Services Home-Independent   Housing / Facility 1 Story House   Steps Into Home 0   Bathroom Set up Walk In Shower   Equipment Owned None   Lives with - Patient's Self Care Capacity Spouse   Comments Pt lives in [facility] SO is able to assist as needed "   Prior Level of ADL Function   Self Feeding Independent   Grooming / Hygiene Independent   Bathing Independent   Dressing Independent   Toileting Independent   Prior Level of IADL Function   Medication Management Independent   Laundry Independent   Kitchen Mobility Independent   Finances Independent   Home Management Independent   Shopping Independent   Prior Level Of Mobility Independent Without Device in Community   Driving / Transportation Driving Independent   Occupation (Pre-Hospital Vocational) Retired Due To Age   History of Falls   History of Falls No   Precautions   Precautions Fall Risk;Swallow Precautions ( See Comments)   Comments L inattention   Pain 0 - 10 Group   Location Head   Location Orientation Right   Therapist Pain Assessment During Activity;Nurse Notified;4   Cognition    Cognition / Consciousness X   Speech/ Communication Dysarthric   Level of Consciousness Alert   Attention Impaired   Comments Primarily L visual inattention   Passive ROM Upper Body   Passive ROM Upper Body WDL   Active ROM Upper Body   Active ROM Upper Body  X   Dominant Hand Right   Lt Shoulder Flexion Degrees 25   Comments limited by weakness proximally and decreased w/fatigue   Strength Upper Body   Upper Body Strength  X   Comments RUE WLF; LUE 3/5 distal grasp 2+/5 proximally shoulder bicep/tricep   Sensation Upper Body   Upper Extremity Sensation  WDL   Upper Body Muscle Tone   Upper Body Muscle Tone  X   Lt Upper Extremity Muscle Tone Hypotonic   Neurological Concerns   Neurological Concerns Yes   Sitting Posture During ADL's Lateral Lean Left   Lt Upper Extremity Gross Motor Control Impaired   Coordination Upper Body   Coordination X   Comments LUE fine and gross impaired by weakness and inattention   Balance Assessment   Sitting Balance (Static) Fair -   Sitting Balance (Dynamic) Poor +   Standing Balance (Static) Poor +   Standing Balance (Dynamic) Poor   Weight Shift Sitting Fair   Weight Shift Standing Poor    Comments w/HHA   Bed Mobility    Supine to Sit Moderate Assist   Rolling Moderate Assist to Rt.   Comments up to chair post session   ADL Assessment   Grooming Minimal Assist;Seated   Upper Body Dressing Moderate Assist   Lower Body Dressing Maximal Assist   Toileting Maximal Assist   Comments completed hair care and face washing w/RUE and cues to incooperative L side, increased assist for LB ADL's   How much help from another person does the patient currently need...   6 Clicks Daily Activity Score 13   Modified [place] (mRS)   Modified [place] Score 4   Functional Mobility   Sit to Stand Moderate Assist   Bed, Chair, Wheelchair Transfer Maximal Assist   Mobility EOB sit>stand x2 then to chair   ICU Target Mobility Level   ICU Mobility - Targeted Level Level 3B   Visual Perception   Visual Perception  X   Visual Scanning Impaired   Comments L inattention   Activity Tolerance   Comments c/o fatigue   Patient / Family Goals   Patient / Family Goal #1 to get stronger   Short Term Goals   Short Term Goal # 1 pt will complete grooming seated w/set up   Short Term Goal # 2 pt will complete txf to BSC w/min A   Short Term Goal # 3 pt will complete UB dressing w/spv   Education Group   Education Provided Transfers;Stroke;Role of Occupational Therapist;Activities of Daily Living;Pathology of bedrest;Upper Extremity Strengthening   Role of Occupational Therapist Patient Response Patient;Acceptance;Explanation   UE Strengthening Patient Response Patient;Acceptance;Explanation   Transfers Patient Response Patient;Acceptance;Explanation   Stroke Patient Response Patient;Acceptance;Explanation   ADL Patient Response Patient;Explanation;Acceptance   Pathology of Bedrest Patient Response Acceptance;Explanation;Patient   Additional Comments Reviewed need to engage L side body into ADL's reviewed UE exercises on table top, completed seated grooming and txf to chair for OOB activity   Problem List   Problem List Decreased Active  Daily Living Skills;Decreased Upper Extremity Strength Left;Decreased Upper Extremity AROM Left;Decreased Functional Mobility;Decreased Activity Tolerance;Safety Awareness Deficits / Cognition;Impaired Coordination Left Upper Extremity;Impaired Cognitive Function;Impaired Vision;Impaired Upper Extremity Tone Left;Impaired Postural Control / Balance

## 2022-03-16 NOTE — THERAPY
"Physical Therapy   Initial Evaluation     Patient Name: Ceci Mata  Age:  71 y.o., Sex:  female  Medical Record #: [id number]  Today's Date: 3/16/2022     Precautions  Precautions: Fall Risk;Swallow Precautions ( See Comments)  Comments: L inattention; SBP < 140    Assessment  Patient is 71 y.o. female that presented to acute with L sided weakness and facial droop. She is s/p thrombectomy. Incidental findings of PMHx significant for PSVT, DVT, obesity, GERD. She presented to PT with impaired balance and coordination, functional weakness, and decreased activity tolerance which are limiting her ability to perform functional mobility at [facility]. She mobilized as detailed below. Provided education regarding neurological recovery and importance of intentional use of L extremities to improve function. Will follow.    Plan    Recommend Physical Therapy 5 times per week until therapy goals are met for the following treatments:  Bed Mobility, Community Re-integration, Equipment, Gait Training, Manual Therapy, Neuro Re-Education / Balance, Self Care/Home Evaluation, Stair Training, Therapeutic Activities, and Therapeutic Exercises    DC Equipment Recommendations: Unable to determine at this time  Discharge Recommendations: Recommend post-acute placement for additional physical therapy services prior to discharge home (recommend PM&R consult)       Subjective    \"I have a bossy sister that will make me work hard.\"     Objective       03/16/22 1016   Total Time Spent   Total Time Spent (Mins) 40   Charge Group   PT Evaluation PT Evaluation Mod   Initial Contact Note    Initial Contact Note Order Received and Verified, Physical Therapy Evaluation in Progress with Full Report to Follow.   Precautions   Precautions Fall Risk;Swallow Precautions ( See Comments)   Comments L inattention   Vitals   O2 (LPM) 2   O2 Delivery Device Silicone Nasal Cannula   Pain 0 - 10 Group   Therapist Pain Assessment   (no pain complaint during " session)   Prior Living Situation   Prior Services None   Housing / Facility 1 Story House   Steps Into Home 4   Steps In Home 0   Equipment Owned None   Lives with - Patient's Self Care Capacity Spouse   Comments Patient lives in [place], reported spouse and supportive extended family including sister and daughter   Prior Level of Functional Mobility   Bed Mobility Independent   Transfer Status Independent   Ambulation Independent   Distance Ambulation (Feet)   (community)   Assistive Devices Used None   Stairs Independent   History of Falls   History of Falls No   Cognition    Cognition / Consciousness X   Speech/ Communication Dysarthric   Level of Consciousness Alert   Attention Impaired   Comments pleasant, cooperative, motivated   Passive ROM Lower Body   Passive ROM Lower Body WDL   Active ROM Lower Body    Active ROM Lower Body  WDL   Strength Lower Body   Lower Body Strength  X   Comments RLE 4/5, LLE 3 to 3+/5 grossly   Sensation Lower Body   Lower Extremity Sensation   WDL   Comments per patient report   Lower Body Muscle Tone   Lower Body Muscle Tone  WDL   Neurological Concerns   Neurological Concerns Yes   Comments given medical dx and presentation; L lateral lean   Coordination Lower Body    Coordination Lower Body  X   Comments L extremities affected, UE > LE   Balance Assessment   Sitting Balance (Static) Fair -   Sitting Balance (Dynamic) Poor +   Standing Balance (Static) Poor +   Standing Balance (Dynamic) Poor   Weight Shift Sitting Fair   Weight Shift Standing Poor   Comments L lateral lean in sitting but able to correct to midline with cueing. HHA x2 in standing   Gait Analysis   Gait Level Of Assist Unable to Participate   Weight Bearing Status no restrictions   Bed Mobility    Supine to Sit Moderate Assist   Sit to Supine   (NT, left in recliner)   Scooting Minimal Assist  (seated)   Functional Mobility   Sit to Stand Moderate Assist   Bed, Chair, Wheelchair Transfer Maximal Assist    Transfer Method Stand Step   Comments facilitation required for weight shifting and LLE limb advancement   ICU Target Mobility Level   ICU Mobility - Targeted Level Level 3B   How much difficulty does the patient currently have...   Turning over in bed (including adjusting bedclothes, sheets and blankets)? 1   Sitting down on and standing up from a chair with arms (e.g., wheelchair, bedside commode, etc.) 1   Moving from lying on back to sitting on the side of the bed? 1   How much help from another person does the patient currently need...   Moving to and from a bed to a chair (including a wheelchair)? 2   Need to walk in a hospital room? 2   Climbing 3-5 steps with a railing? 1   6 clicks Mobility Score 8   Activity Tolerance   Sitting in Chair left in recliner   Sitting Edge of Bed 15 min   Standing 2-3 min   Comments limited by fatigue   Edema / Skin Assessment   Edema / Skin  Not Assessed   Patient / Family Goals    Patient / Family Goal #1 return to PLOF   Short Term Goals    Short Term Goal # 1 Patient will move supine<>sitting EOB without bed features with supervision within 6tx in order to get in/out of bed   Short Term Goal # 2 Patient will move sitting<>standing with supervision within 6tx in order to initiate transfers and gait   Short Term Goal # 3 Patient will ambulate 50ft with supervision within 6tx in order to access environment   Short Term Goal # 4 Patient will ascend/descend 4 steps with min A within 6tx in order to enter/exit home   Education Group   Education Provided Role of Physical Therapist   Role of Physical Therapist Patient Response Patient;Acceptance;Explanation;Verbal Demonstration   Additional Comments edu re: neurological recovery   Problem List    Problems Impaired Bed Mobility;Impaired Transfers;Impaired Ambulation;Impaired Balance;Impaired Coordination;Decreased Activity Tolerance;Functional Strength Deficit   Anticipated Discharge Equipment and Recommendations   DC Equipment  Recommendations Unable to determine at this time   Discharge Recommendations Recommend post-acute placement for additional physical therapy services prior to discharge home  (recommend PM&R consult)   Interdisciplinary Plan of Care Collaboration   IDT Collaboration with  Nursing;Occupational Therapist   Patient Position at End of Therapy Seated;Chair Alarm On;Call Light within Reach;Tray Table within Reach;Phone within Reach   Collaboration Comments RN aware of visit, response   Session Information   Date / Session Number  3/16-1 (1/5, 3/22)  (CVA)

## 2022-03-16 NOTE — THERAPY
Speech Language Pathology  Daily Treatment     Patient Name: Ceci Mata  Age:  71 y.o., Sex:  female  Medical Record #: [id number]  Today's Date: 3/16/2022     Precautions  Precautions: Fall Risk,Swallow Precautions ( See Comments)  Comments: L inattention    Assessment  Patient seen this date for dysphagia tx session. Patient currently on MM5/TN0 diet and seen with breakfast tray. Patient awake, alert, and up in the chair, but sleepy. Patient consumed bites of minced/moist sausage, pureed [place] toast, pureed berries, and thins via straw. Patient had cough x1 on large straw sip of thins, but once this was reduced to small sip, no further s/sx of aspiration noted on thins or any other textures. However, mastication was prolonged on minced/moist texture and significant amount of residue was noted in the oral cavity on the lingual surface and in left lateral sulci post-swallow on minced/moist texture as well. This took 3-4 sips of thin liquid wash to clear. Minimal residue was noted post-swallow on purees, but this was much improved compared to minced/moist textures and easily cleared w/ 1-2 sips of thin liquid wash. Patient reported fatigue w/ minced/moist texture as well. Patient was too sleepy today to complete exercises or cognitive evaluation this date and requested to get back to bed w/ RN.     Recommend patient downgrade to PU4/TN0 diet with 1:1 feeding. Crush meds in puree. Small straw sips ok. SLP is following.     Plan  Continue current treatment plan.    Discharge Recommendations: Recommend post-acute placement for additional speech therapy services prior to discharge home     Objective     03/16/22 1025   Precautions   Precautions Fall Risk;Swallow Precautions ( See Comments)   Vitals   O2 (LPM) 2   O2 Delivery Device Silicone Nasal Cannula   Short Term Goals   Short Term Goal # 1 Pt will consume MM5/TN0 diet with no overt s/sx of aspiration.   Goal Outcome # 1 Progressing slower than expected    Anticipated Discharge Needs   Discharge Recommendations Recommend post-acute placement for additional speech therapy services prior to discharge home

## 2022-03-16 NOTE — CARE PLAN
The patient is Stable - Low risk of patient condition declining or worsening    Shift Goals  Clinical Goals: Increased mobility  Patient Goals: Change diet, eat  Family Goals: Unable to assess, family not present at this time    Progress made toward(s) clinical / shift goals:  Patient was re-evaluated by speech, patient was seen by PT/OT and mobilized to chair      Problem: Knowledge Deficit - Stroke Education  Goal: Patient's knowledge of stroke and risk factors will improve  Outcome: Progressing     Problem: Neuro Status  Goal: Neuro status will remain stable or improve  Outcome: Progressing     Patient is not progressing towards the following goals:    Problem: Psychosocial - Patient Condition  Goal: Patient's ability to verbalize feelings about condition will improve  Outcome: Not Met  Goal: Patient's ability to re-evaluate and adapt role responsibilities will improve  Outcome: Not Met

## 2022-03-16 NOTE — PROGRESS NOTES
Chief Complaint   Patient presents with   • Unilateral Weakness     Left sided   • Facial Droop       Problem List Items Addressed This Visit    None     Visit Diagnoses     Acute embolic stroke (HCC)        Relevant Medications    labetalol (NORMODYNE/TRANDATE) injection 10 mg    niCARdipine (CARDENE) 25 mg in  mL Infusion    apixaban (ELIQUIS) 5mg Tab    atorvastatin (LIPITOR) tablet 80 mg    hydrALAZINE (APRESOLINE) injection 10 mg    metoprolol tartrate (LOPRESSOR) tablet 25 mg    Other Relevant Orders    Referral to Physiatry (PMR)    Referral to Neurology        Neurology Stroke Progress Note    History of present illness:  This is a 71-year old female with PMhx significant for DVT (lower extremity; currently on reported 7-day course of Eliquis; unclear compliance), also with morbid obesity, recurrent UTIs, further details are limited who presented to [facility] on 3/14/22 for a chief complaint of Left sided weakness. The patient was reportedly in her usual state of health this afternoon; at 1350, the patient's  heard the patient drop her iPad; he went to help the patient, and found her with Left UE/LE flaccidity. EMS called; on scene SBP 110s; . Stroke Pre Alert called at 1459. On arrival here at 1514, patient notably with Right MCA syndrome, NIHSS 15. Stat CT head revealed no acute intracranial abnormality; CTA head/neck with Right MCA M1 thrombus. CT perfusion study with large (121 mL) mismatch/penumnbra with no core. Given patient's concurrent use of Eliquis and indeterminate compliance/unknown dose, the decision was made to forgo IV thrombolytics and pursue only IR thrombectomy.      LKW 1350  Door time 1514  To IR at 1548    Currently patient is sitting up in stretcher; awake and alert. Speech is dysarthric, however coherent. Right gaze present, unable to cross midline. Left pasquale-neglect also present. Further ROS is limited secondary to patient's critical condition. NIHSS  remains at 15.     Neurology has been consulted by Dr. Luis Rosado to further evaluate the findings noted above.     Interval, 3/15/22  Patient is sitting up in bed; awake and alert. Still with LUE>LLE weakness, improved today; very mild Right gaze preference, dysarthria; overall improved this morning. SBP 130s. Awaiting MRI Brain this morning.     Patient is s/p IR Right MCA M1 thrombectomy with TICI2C. Etiology thus far unclear. Awaiting TTE today as well, obtain bubble study for PFO eval.     Interval, 3/16/22:  Patient is sitting up in bed; drowsy however awake. States that she feels tired. Admits to persistent LUE/LLE weakness; perhaps mildly improved today. No events overnight per nursing. ?plan for floor transfer today.     No changes to HPI as was previously documented.     Past medical history:   No past medical history on file.    Past surgical history:   No past surgical history on file.    Family history:   No family history on file.    Social history:   Social History     Socioeconomic History   • Marital status:      Spouse name: Not on file   • Number of children: Not on file   • Years of education: Not on file   • Highest education level: Not on file   Occupational History   • Not on file   Tobacco Use   • Smoking status: Never Smoker   • Smokeless tobacco: Never Used   Substance and Sexual Activity   • Alcohol use: Not on file   • Drug use: Not on file   • Sexual activity: Not on file   Other Topics Concern   • Not on file   Social History Narrative   • Not on file     Social Determinants of Health     Financial Resource Strain: Not on file   Food Insecurity: Not on file   Transportation Needs: Not on file   Physical Activity: Not on file   Stress: Not on file   Social Connections: Not on file   Intimate Partner Violence: Not on file   Housing Stability: Not on file       Current medications:   Current Facility-Administered Medications   Medication Dose   • omeprazole (FIRST-OMEPRAZOLE) 2  mg/mL oral susp 40 mg  40 mg   • metoprolol tartrate (LOPRESSOR) tablet 25 mg  25 mg   • labetalol (NORMODYNE/TRANDATE) injection 10 mg  10 mg   • niCARdipine (CARDENE) 25 mg in  mL Infusion  0-15 mg/hr   • senna-docusate (PERICOLACE or SENOKOT S) 8.6-50 MG per tablet 2 Tablet  2 Tablet    And   • polyethylene glycol/lytes (MIRALAX) PACKET 1 Packet  1 Packet    And   • magnesium hydroxide (MILK OF MAGNESIA) suspension 30 mL  30 mL    And   • bisacodyl (DULCOLAX) suppository 10 mg  10 mg   • Respiratory Therapy Consult     • acetaminophen (Tylenol) tablet 650 mg  650 mg   • atorvastatin (LIPITOR) tablet 80 mg  80 mg   • hydrALAZINE (APRESOLINE) injection 10 mg  10 mg   • morphine 4 MG/ML injection 1 mg  1 mg       Medication Allergy:  Allergies   Allergen Reactions   • Sulfa Drugs      itchy mouth and eyes       Review of systems:   Constitutional: denies fever, night sweats, weight loss.   Eyes: denies acute vision change, eye pain or secretion.   Ears, Nose, Mouth, Throat: denies nasal secretion, nasal bleeding, difficulty swallowing, hearing loss, tinnitus, vertigo, ear pain, acute dental problems, oral ulcers or lesions.   Endocrine: denies recent weight changes, heat or cold intolerance, polyuria, polydypsia, polyphagia,abnormal hair growth.  Cardiovascular: denies new onset of chest pain, palpitations, syncope, or dyspnea of exertion.  Pulmonary: denies shortness of breath, new onset of cough, hemoptysis, wheezing, chest pain or flu-like symptoms.   GI: denies nausea, vomiting, diarrhea, GI bleeding, change in appetite, abdominal pain, and change in bowel habits.  : denies dysuria, urinary incontinence, hematuria.  Heme/oncology: denies history of easy bruising or bleeding. No history of cancer, DVTor PE.  Allergy/immunology: denies hives/urticaria, or itching.   Dermatologic: denies new rash, or new skin lesions.  Musculoskeletal:denies joint swelling or pain, muscle pain, neck and back pain.    Neurologic: As noted in detail above.   Psychiatric: denies symptoms of depression, anxiety, hallucinations, mood swings or changes, suicidal or homicidal thoughts.     Physical examination:   Vitals:    03/16/22 0300 03/16/22 0400 03/16/22 0500 03/16/22 0600   BP: 117/56 122/59 119/60 106/53   Pulse: 89 96 84 88   Resp: (!) 21 16 (!) 22 19   Temp:  36.7 °C (98 °F)  36.7 °C (98 °F)   TempSrc:  Temporal  Temporal   SpO2: 96% 95% 94% 92%   Weight:       Height:         General: Patient in no acute distress, cooperative.  HEENT: Normocephalic, no signs of acute trauma.   Neck: supple, no meningeal signs or carotid bruits. There is normal range of motion. No tenderness on exam.   Chest: clear to auscultation. No cough.   CV: RRR, no murmurs.   Skin: no signs of acute rashes or trauma.   Musculoskeletal: joints exhibit full range of motion, without any pain to palpation. There are no signs of joint or muscle swelling. There is no tenderness to deep palpation of muscles.   Psychiatric: No hallucinatory behavior. Denies symptoms of depression or suicidal ideation. Mood and affect appear normal on exam.     NEUROLOGICAL EXAM:   Mental status, orientation: Awake, alert and fully oriented.   Speech and language: speech is somewhat fluent. Mildly dysarthric, The patient is able to name, repeat and comprehend.   Cranial nerve exam: Pupils are 3-4 mm bilaterally and equally reactive to light. Visual fields are intact by confrontation. There is no nystagmus on primary or secondary gaze. Intact full EOM in all directions of gaze. Right upper/lower facial weakness present. Sensation in the face is intact to light touch. Uvula is midline. Palate elevates symmetrically. Tongue is midline and without any signs of tongue biting or fasciculations. Shoulder shrug is intact bilaterally.   Motor exam: Strength is 5/5 in RUE/RLE; 4/5 to LUE, 4+/5 LLE. Tone is otherwise normal. No abnormal movements were seen on exam.   Sensory exam  reveals normal sense of light touch and pinprick in all extremities. Tactile neglect/extinction present.   Deep tendon reflexes:  Plantar responses are flexor. There is no clonus.   Coordination: shows a normal finger-nose-finger on the Right, Left unable.     Gait: Not assessed at this time as patient is currently unable.     No significant changes to exam as was documented on 3/15/22.       NIH Stroke Scale    1a Level of Consciousness   1b Orientation Questions   1c Response to Commands   2 Gaze 1  3 Visual Fields   4 Facial Movement 1  5 Motor Function (arm)   a Left 2  b Right   6 Motor Function (leg)   a Left 1  b Right   7 Limb Ataxia   8 Sensory   9 Language   10 Articulation 1  11 Extinction/Inattention 1    Score: 7        ANCILLARY DATA REVIEWED:     Lab Data Review:  Recent Results (from the past 24 hour(s))   EC-ECHOCARDIOGRAM COMPLETE W/O CONT    Collection Time: 03/15/22  2:38 PM   Result Value Ref Range    Eject.Frac. MOD BP 64.16     Eject.Frac. MOD 4C 71.45     Eject.Frac. MOD 2C 59.83     Left Ventrical Ejection Fraction 60    CBC WITH DIFFERENTIAL    Collection Time: 03/16/22  3:41 AM   Result Value Ref Range    WBC 11.5 (H) 4.8 - 10.8 K/uL    RBC 2.54 (L) 4.20 - 5.40 M/uL    Hemoglobin 8.3 (L) 12.0 - 16.0 g/dL    Hematocrit 26.4 (L) 37.0 - 47.0 %    .9 (H) 81.4 - 97.8 fL    MCH 32.7 27.0 - 33.0 pg    MCHC 31.4 (L) 33.6 - 35.0 g/dL    RDW 51.2 (H) 35.9 - 50.0 fL    Platelet Count 179 164 - 446 K/uL    MPV 10.4 9.0 - 12.9 fL    Neutrophils-Polys 71.10 44.00 - 72.00 %    Lymphocytes 16.30 (L) 22.00 - 41.00 %    Monocytes 9.90 0.00 - 13.40 %    Eosinophils 1.60 0.00 - 6.90 %    Basophils 0.70 0.00 - 1.80 %    Immature Granulocytes 0.40 0.00 - 0.90 %    Nucleated RBC 0.00 /100 WBC    Neutrophils (Absolute) 8.17 (H) 2.00 - 7.15 K/uL    Lymphs (Absolute) 1.87 1.00 - 4.80 K/uL    Monos (Absolute) 1.14 (H) 0.00 - 0.85 K/uL    Eos (Absolute) 0.18 0.00 - 0.51 K/uL    Baso (Absolute) 0.08 0.00 - 0.12  K/uL    Immature Granulocytes (abs) 0.05 0.00 - 0.11 K/uL    NRBC (Absolute) 0.00 K/uL   Basic Metabolic Panel    Collection Time: 03/16/22  3:41 AM   Result Value Ref Range    Sodium 135 135 - 145 mmol/L    Potassium 4.3 3.6 - 5.5 mmol/L    Chloride 104 96 - 112 mmol/L    Co2 23 20 - 33 mmol/L    Glucose 114 (H) 65 - 99 mg/dL    Bun 17 8 - 22 mg/dL    Creatinine 0.78 0.50 - 1.40 mg/dL    Calcium 8.1 (L) 8.5 - 10.5 mg/dL    Anion Gap 8.0 7.0 - 16.0   RETICULOCYTES COUNT    Collection Time: 03/16/22  3:41 AM   Result Value Ref Range    Reticulocyte Count 4.4 (H) 0.8 - 2.1 %    Retic, Absolute 0.11 (H) 0.04 - 0.06 M/uL    Imm. Reticulocyte Fraction 22.3 (H) 9.3 - 17.4 %    Retic Hgb Equivalent 35.8 (H) 29.0 - 35.0 pg/cell   HEPATIC FUNCTION PANEL    Collection Time: 03/16/22  3:41 AM   Result Value Ref Range    Alkaline Phosphatase 171 (H) 30 - 99 U/L    AST(SGOT) 47 (H) 12 - 45 U/L    ALT(SGPT) 23 2 - 50 U/L    Total Bilirubin 0.8 0.1 - 1.5 mg/dL    Direct Bilirubin <0.2 0.1 - 0.5 mg/dL    Indirect Bilirubin see below 0.0 - 1.0 mg/dL    Albumin 2.9 (L) 3.2 - 4.9 g/dL    Total Protein 5.7 (L) 6.0 - 8.2 g/dL   MAGNESIUM    Collection Time: 03/16/22  3:41 AM   Result Value Ref Range    Magnesium 2.1 1.5 - 2.5 mg/dL   ESTIMATED GFR    Collection Time: 03/16/22  3:41 AM   Result Value Ref Range    GFR (CKD-EPI) 81 >60 mL/min/1.73 m 2       Labs reviewed by me.       Imaging reviewed by me:     EC-ECHOCARDIOGRAM COMPLETE W/O CONT   Final Result      MR-BRAIN-W/O   Final Result         Acute infarct in the right insula/basal ganglia/right caudate region.      Punctate foci of acute infarction involving the right frontoparietal cortex and subcortical region.      Multiple foci of acute infarction involving the bilateral inferior cerebellum.      Focal area of parenchymal hemorrhage in the right insular region with minimal surrounding edema and slight mass effect upon the right lateral ventricle.      Minimal subarachnoid  hemorrhage in the sulci in the right temporoparietal region.      DX-CHEST-PORTABLE (1 VIEW)   Final Result      1.  No acute cardiac or pulmonary abnormalities are identified.      IR-THROMBO MECHANICAL ARTERY,INIT   Final Result         71-year-old patient who presented with acute onset of left-sided weakness and facial droop with an NIH stroke scale of 15 underwent emergent mechanical thrombectomy for an ICA terminus occlusion on the right side. The final angiographic images    demonstrate complete recanalization of the ICA, MCA on the right side with minimal distal embolization to a cortical M4 branch each in the superior and in the inferior division.      Final recanalization score: TICI 2C      I, Abdirahman Zhu was physically present and participated during the entire procedure of the IR-THROMBO MECHANICAL ARTERY,INIT.                  CT-CEREBRAL PERFUSION ANALYSIS   Final Result      1.  Cerebral blood flow less than 30% likely representing completed infarct = 0 mL.      2.  T Max more than 6 seconds likely representing combination of completed infarct and ischemia = 121 mL.      3.  Mismatched volume likely representing ischemic brain/penumbra = infinite      4.  Please note that the cerebral perfusion was performed on the limited brain tissue around the basal ganglia region. Infarct/ischemia outside the CT perfusion sections can be missed in this study.      CT-CTA NECK WITH & W/O-POST PROCESSING   Final Result      1.  Mild bilateral atherosclerosis with less than 50% ICA stenosis.   2.  There is a nonspecific 4 mm posterior right upper lobe lung nodule.      Fleischner Society pulmonary nodule recommendations:   Low Risk: No routine follow-up      High Risk: Optional CT at 12 months      Comments: Nodules less than 6 mm do not require routine follow-up, but certain patients at high risk with suspicious nodule morphology, upper lobe location, or both may warrant 12-month follow-up.      Low Risk -  Minimal or absent history of smoking and of other known risk factors.      High Risk - History of smoking or of other known risk factors.      Note: These recommendations do not apply to lung cancer screening, patients with immunosuppression, or patients with known primary cancer.      Fleischner Society 2017 Guidelines for Management of Incidentally Detected Pulmonary Nodules in Adults         CT-CTA HEAD WITH & W/O-POST PROCESS   Final Result      1.  There is a right M1 occlusion. Findings were discussed with Narcisa Chapman by Dr. Zhu the neurointerventional radiologist who has already seen the exam on 3/14/2022 at 3:35 PM.      CT-HEAD W/O   Final Result      1.  There are periventricular and subcortical white matter changes present.  This finding is nonspecific and could be from indeterminate age small vessel ischemia, demyelination, or gliosis. MRI would be more sensitive for further evaluation.   2.  Mild paranasal sinus disease.                Presumed mechanism by TOAST:  __Large Artery Atherosclerosis  __Small Vessel (Lacunar)  __Cardioembolic  __Other (Sickle Cell, Vasculitis, Hypercoagulable)  _X_Unknown    Modified Blaze Scale (MRS): 0 = No symptoms      ASSESSMENT AND PLAN:  71-year old female with PMhx significant for DVT (lower extremity; currently on reported 7-day course of Eliquis; unclear compliance), also with morbid obesity, recurrent UTIs, further details are limited who presented to [facility] on 3/14/22 for a chief complaint of Left sided weakness, onset 1350 om 3/14/22. On arrival here, stat CT head revealed no acute intracranial abnormality; CTA head/neck with Right MCA M1 thrombus. CT perfusion study with large (121 mL) mismatch/penumbra with no core. Given patient's concurrent use of Eliquis and indeterminate compliance/unknown dose, the decision was made to forgo IV thrombolytics and pursue only IR thrombectomy. Patient now s/p IR Right MCA thrombectomy per Dr. Hawley with  TICI 2; NIHSS 15-->7; MRI Brain wo contrast has revealed acute ischemic infarct involving Right temporal region/R MCA territory with mild/moderate hemorrhagic conversion. Plan to hold antithrombotics/anticoagulation x 5-7 days.     Regarding etiology, could consider embolic/cardioembolic process; will also noted findings per TTE-- multiple vascularized nodules involving the liver; also note elevated LFTs. Would thus recommend CT chest/abdomen/pelvis and thus consideration of hypercoagulable state in setting of possible malignancy.     Recommendations/Plan:     -q4h and PRN neuro assessment. VS per nursing/unit protocol. BP goal is normotension,100-130/60-80. Antihypertensives per ICU team.  OK for floor transfer today.   -Telemetry; currently SR. Screen for Afib/arrhythmia. Note TTE with EF 60%; no gross structural abnormalities noted involving cardiovascular system; did note multiple liver nodules as noted above. If work up is unrevealing, recommend Zio patch for outpatient cardiac monitoring at time of discharge.   -Hold antithrombotics/anticoagulation until 3/21/22; may plan to restart following stable repeat CT head.   -Atorvastatin 80 mg PO q HS. Note LDL is 100, goal < 70.   -Recommend aggressive BG management per primary team. Avoid IVF with Dextrose. BG goal 140-180. hemoglobin A1c is 4.3.   -PT/OT/SLP eval and treat. Physiatry consult.   -Will order CT chest/abdomen/pelvis, to be followed by medicine/hospitalist team.   -Will follow up with results of the above and make additional recommendations accordingly. All other medical management per primary/ICU team.   -DVT PPX: SCDs.      The plan of care above has been discussed with Dr. Drew Monteiro. Other than the above, no further recommendations from a neurological perspective; neurology will follow peripherally. Please call with questions.     JAMES Butcher.P.R.N.  [facility]    **Addendeum 3/17/22 0745: Note CT chest ab pelvis with  likely primary pancreatic malignancy with metastases about the abdomen. Recommend restarting Eliquis, 5 mg PO BID for suspected hypercoagulable state in setting of malignancy on/after 3/21/22 following stable repeat CT head. No further recommendations from neurology; please call with questions.

## 2022-03-16 NOTE — DOCUMENTATION QUERY
"                                                                         [facility]                                                                       Query Response Note      PATIENT:               MARILYN GREEN  ACCT #:                  [id number]  MRN:                     [id number]  :                      1950  ADMIT DATE:       3/14/2022 3:14 PM  DISCH DATE:          RESPONDING  PROVIDER #:        [id number]           QUERY TEXT:    Please provide additional clinical indicators supportive of your documented diagnosis of Acute hypoxic respiratory failure.    If you have any questions please contact:  Nereida Herrera RN [facility]  [email or web address]  Nereida Herrera [address]              The patient's Clinical Indicators include:  71 year old female admitted for CVA    The diagnosis of acute respiratory failure is documented in the medical record. The patient has received a maximum of 2-3L via NC since arrival. The ED notes say normal breath sounds and nor respiratory distress l. Is acute respiratory failure an accurate diagnosis for this admission?    3/14 ED Rosado \"Thorax & Lungs: Normal breath sounds, No respiratory distress\"  3/14 Thon \"Pulmonary: denies shortness of breath, new onset of cough, hemoptysis, wheezing, chest pain or flu-like symptoms.\"  3/14 [place]/Kayla \" Pulmonary:    Effort: Pulmonary effort is normal. No respiratory distress.    Breath sounds: Normal breath sounds.\"  \"Acute hypoxic respiratory failure -On 3L NC\"    RN flowsheets pt is on 2L with sats in high 90's    Risk factors: Stroke  Treatment: O2  Options provided:   -- No acute respiratory failure is not a valid diagnosis during this admission   -- Yes acute respiratory failure is a valid diagnosis during this admission   -- Other   -- Unable to determine      Query created by: Nereida Herrera on 3/15/2022 8:58 AM    RESPONSE TEXT:    No acute respiratory failure is not a valid diagnosis during this " admission          Electronically signed by:  MARLEN RODRIGUEZ MD 3/16/2022 8:43 AM

## 2022-03-17 NOTE — CARE PLAN
The patient is Stable - Low risk of patient condition declining or worsening    Shift Goals  Clinical Goals: increased mobility and paticipation  Patient Goals: comfort  Family Goals: KIMBERLY    Progress made toward(s) clinical / shift goals:  pt up to chair, 3x personal assistance, attempting to assist     Patient is not progressing towards the following goals:no improvement in strength on Left side.    Problem: Knowledge Deficit - Stroke Education  Goal: Patient's knowledge of stroke and risk factors will improve  Outcome: Met     Problem: Psychosocial - Patient Condition  Goal: Patient's ability to verbalize feelings about condition will improve  Outcome: Met  Goal: Patient's ability to re-evaluate and adapt role responsibilities will improve  Outcome: Met     Problem: Neuro Status  Goal: Neuro status will remain stable or improve  Outcome: Progressing  Note: Lethargic but increased arousal with family present. Up to chair

## 2022-03-17 NOTE — DISCHARGE PLANNING
Anticipated Discharge Disposition: SNF    Action: RN CM attended IDT rounds and reviewed patient chart.  Patient has orders to transfer to floor when bed available, hospitalist service now attending.  Order for SNF placed in [facility] today.  RN CM met with family at bedside to discuss discharge planning options and assessment.  Patient lives in [place], [place] with spouse in single story home.  Patient has walk-in shower, no DME, independent with ADL's/IADL's prior to admission.    Family provided with choice form for [facility] hospital and for [facility].  Family would like to research and discuss SNF choices before making decision.  Choice form left with family to decide which facilities they would like referrals sent to, family reports likely in the [facility] area so patient can be closer to hospital.  Family also had questions regarding billing, provided patient financial assistance phone number to family.      Barriers to Discharge: SNF choice, acceptance, and bed availability     Plan: HCM to remain available to assist with discharge planning needs and barriers     Care Transition Team Assessment  Emergency Contacts  Todd Mata (spouse) [phone]  Jaylon Mata (son) [phone]    Information Source  Orientation Level: Oriented X4  Information Given By: Patient,Relative  Informant's Name: Ceci Mata  Who is responsible for making decisions for patient? : Patient    Readmission Evaluation  Is this a readmission?: No    Elopement Risk  Legal Hold: No  Ambulatory or Self Mobile in Wheelchair: No-Not an Elopement Risk  Elopement Risk: Not at Risk for Elopement    Interdisciplinary Discharge Planning  Lives with - Patient's Self Care Capacity: Spouse  Housing / Facility: [address] [facility]  Prior Services: Home-Independent    Discharge Preparedness  What is your plan after discharge?: Skilled nursing facility  What are your discharge supports?: Child,Spouse  Prior Functional Level: Ambulatory,Independent with Activities  of Daily Living  Difficulity with ADLs: None  Difficulity with IADLs: None    Functional Assesment  Prior Functional Level: Ambulatory,Independent with Activities of Daily Living    Finances  Financial Barriers to Discharge: No  Prescription Coverage: Yes    Vision / Hearing Impairment  Right Eye Vision: Wears Glasses  Left Eye Vision: Wears Glasses    Advance Directive  Advance Directive?: None  Advance Directive offered?: AD Booklet refused    Domestic Abuse  Have you ever been the victim of abuse or violence?: No    Psychological Assessment  History of Substance Abuse: None  History of Psychiatric Problems: No  Non-compliant with Treatment: No  Newly Diagnosed Illness: Yes    Discharge Risks or Barriers  Discharge risks or barriers?: No PCP,Post-acute placement / services,Complex medical needs  Patient risk factors: Cognitive / sensory / physical deficit,Complex medical needs,No PCP,Vulnerable adult    Anticipated Discharge Information  Discharge Disposition: D/T to SNF with Medicare cert in anticipation of skilled care (03)

## 2022-03-17 NOTE — PREADMISSION SCREENING NOTE
Pre-Admission Screening Form    Patient Information:   Name: Ceci Mata     MRN: [id number]       : 1950      Age: 71 y.o.   Gender: female      Race: White [7]       Marital Status:  [2]  Family Contact: Todd Mata, Jaylon Rosas Mckayla        Relationship: Spouse [17]  Son [15]  Sister [14]  Home Phone:                Cell Phone: [phone]  Advanced Directives: None  Code Status:  FULL  Current Attending Provider: Giovanni Talbert M.D.  Referring Physician: Dr. Pearce      Physiatrist Consult: Dr. Bear       Referral Date: 03/15/22  Primary Payor Source:  MEDICARE  Secondary Payor Source:  [facility]    Medical Information:   Date of Admission to Acute Care Setting:3/14/2022  Room Number: R114/00  Rehabilitation Diagnosis: 0001.1 - Stroke: Left Body Involvement (Right Brain)    There is no immunization history on file for this patient.  Allergies   Allergen Reactions   • Sulfa Drugs      itchy mouth and eyes     No past medical history on file.  No past surgical history on file.    History Leading to Admission, Conditions that Caused the Need for Rehab (CMS):     Hospital Medicine Consultation     Date of Service  3/16/2022     Referring Physician  Dewayne Giordano M.D.     Consulting Physician  Giovanni Talbert M.D.     Reason for Consultation  Take over medical care     History of Presenting Illness  71 y.o. female who presented 3/14/2022 with recent history of DVT who has been on Eliquis PSVT and GERD presented to the emergency department for evaluation of left-sided weakness and facial droop.  She was evaluated by neurology and her initial work-up revealed a right M1 occlusion.  She was not a candidate for lytic therapy due to anticoagulation and she underwent thrombectomy by interventional radiology TICI 2C and was admitted to the intensive care unit.  Follow-up MRI revealed acute ischemic infarct in the right temporal region right MCA territory  with mild to moderate hemorrhagic conversion.  She was also noted to have a pulmonary nodule on her CT of the neck and concern for possible liver mass incidentally noted on echocardiogram.  Patient does not have any history of malignancy.  On my examination she is lethargic but arousable and answer questions appropriately.  He is complaining of a mild dull headache.  She has left-sided weakness.  She denies any chest pain or dyspnea.  She is afebrile.     Review of Systems  Review of Systems   All other systems reviewed and are negative.        Past Medical History  Hypertension DVT GERD PSVT  Surgical History  Reviewed and not pertinent to the presenting problem  Family History  Father had pancreatic cancer  Social History   reports that she has never smoked. She has never used smokeless tobacco.  No alcohol or drug use     Assessment/Plan  * Stroke (cerebrum) (HCC)  Assessment & Plan  Status post right M1 thrombectomy  With mild to moderate ICH on MRI  Blood pressure control goal -130  PT/OT/SLP  Neurology recommending to hold antiplatelet/antithrombotics through 3/21/2022  Physiatry referral  Continue atorvastatin     Obesity  Assessment & Plan  Body mass index is 31.95 kg/m².      DVT (deep venous thrombosis) (HCC)  Assessment & Plan  Recent DVT was maintained on Eliquis  Given ICH neurology recommending holding antiplatelet/anticoagulant for 5-7 days     Liver mass  Assessment & Plan  Follow-up on ordered CT chest abdomen and pelvis     Anemia- (present on admission)  Assessment & Plan  No clinical signs of acute bleeding monitor CBC     Paroxysmal SVT (supraventricular tachycardia) (HCC)  Assessment & Plan  Continue metoprolol     GERD (gastroesophageal reflux disease)- (present on admission)  Assessment & Plan  Continue Prilosec     Lung nodule < 6cm on CT- (present on admission)  Assessment & Plan  Reviewed findings with patient and discussed need for outpatient follow-up        Critical  Care/Pulmonary Consultation     Date of Service: 3/14/2022     Date of Admission:  3/14/2022  3:14 PM     Consulting Physician: Kip Garza M.D.     Chief Complaint:  Unilateral Weakness (Left sided) and Facial Droop     History of Present Illness: Ceci Mata is a 71 y.o. female with a past medical history of gastroesophageal reflux disease (on omeprazole 20 mg OD), paroxysmal SVT (metoprolol 25 mg BID), left leg DVT for which recently started on apixaban on 3/8/22, BMI 40,  Who presented to the ED for left sided weakness and facial droop. Last known well:1350     Code stroke called in the ED, evaluated by neurology, NIHSS 15 on arrival.  CT head showed no evidence of hemorrhage, mild sinus disease. CTA head & neck showed Right M1 occlusion. Not a candidate for TPA given patient on anti-coagulation.  Underwent IR guided thrombectomy with TICI 2C and is admitted to neuro-ICU for serial neurological monitoring and BP control. She received a dose of labetolol 10 mg IV at arrival, Il IVF NS bolus for low BP after procedure.    Assessment and Plan:  Acute ischemic stroke (HCC)  -Right MCA syndrome with M1 occlusion s/p thrombectomy with TICI 2C by IR  -SBP goal 120 -140  -Avoid hypotension 120 systolic.  Use IV 0.9% normal saline bolus if necessary  -Serial neurologic exams per protocol  -Lipid panel pending, started on atorvastatin 80 mg OD, goal for LDL <70  -HbA1C pending, goal for normoglycemia  -Pending TTE  -Pending MRI brain WO  -Euthermia/eunatremia  -Hold Aspirin until repeat imaging  -Hold apixaban until repeat imaging  -SCDs for now, will wait on aspirin or anti-coagulation until cleared by neurology  -PT/OT/SLP consult     Acute hypoxic respiratory failure  -On 3L NC  -Likely due to stroke  -Encourage IS and mobility as appropriate     GERD (gastroesophageal reflux disease)  -On omeprazole at home  -IV pantprazole daily while NPO, can change to home med when cleared by speech     Paroxysmal SVT  (supraventricular tachycardia) (HCC)  -H/o SVT on metoprolol 25 mg BID at home  -Restart home medication when cleared by SLP  -Monitor on telemetry  -IV labetalol or metoprolol PRN      Anemia  -Macrocytic anemia  -Pending B12/folate/TSH with reflex T4  -CTM     Lung nodule < 6cm on CT  Incidental finding of 4 mm posterior right upper lobe lung nodule  Follow up with PCP        DVT prophylaxis: SCDs (no pharmacological in setting of thrombectomy)  GI prophylaxis: PPI  Code Status: Full Code               Cosigned by: Kip Garza M.D. at 3/14/2022  9:32 PM     Attestation signed by Kip Garza M.D. at 3/14/2022  9:32 PM     I have seen and examined the patient today.  I have reviewed the medical record, laboratory data, imaging and all relevant studies.  I have discussed the plan of care with the Internal Medicine Resident and agree with the note and plan as documented with the following additions and clarifications:       HPI  : 71 y.o. F, PMH PSVT, recent DVT on apixaban, obesity, presented to ED 3/14/2022 with acute left-sided weakness and facial droop, LK W 13: 50, NIHSS 15.  Not given alteplase due to chronic anticoagulation.  She was taken directly to IR for thrombectomy and underwent successful mechanical thrombectomy of a right M1 occlusion with resultant TICI 2C flow.  She has had some clinical improvement post procedure now moving both upper extremities with improving  strength and following commands.       On exam: Right gaze preference, left facial droop, 3-4/5 left-sided weakness, improving, awake, A/O x3           A/P  :   Acute right MCA CVA   - Successful thrombectomy for right M1 occlusion, TICI 2C flow   - Discussed with IR, goal  - 140,  antihypertensive medication as needed   - Avoid hypotension, use IV 0.9% normal saline bolus if necessary.   - Neurochecks per post-thrombectomy protocol.   - MRI brain w/o contrast ordered to be done 24 hr from tPA. If it is delayed for >6 hrs,  instructed the RN to obtain CT head w/o contrast instead.   - Echocardiogram and tele, arrhythmia w/u   - Initiate daily aspirin 24 hours after thrombectomy and review of 24 MRI/CT   - Hold full dose anticoagulation pending repeat brain imaging and discussion of risk/benefit with neurology guarding timing   - High-intnsity statin therapy   - Maintain euglycemia <180mg/dL   - Consult PT/OT/Speech/Nutrition       The patient remains critically ill.  Critical care time = 40 minutes in directly providing and coordinating critical care and extensive data review.  No time overlap and excludes procedures.             Neurology Consult 3/14:  Attestation signed by Kip Garza M.D. at 3/14/2022  9:32 PM     I have seen and examined the patient today.  I have reviewed the medical record, laboratory data, imaging and all relevant studies.  I have discussed the plan of care with the Internal Medicine Resident and agree with the note and plan as documented with the following additions and clarifications:       HPI  : 71 y.o. F, PMH PSVT, recent DVT on apixaban, obesity, presented to ED 3/14/2022 with acute left-sided weakness and facial droop, LK W 13: 50, NIHSS 15.  Not given alteplase due to chronic anticoagulation.  She was taken directly to IR for thrombectomy and underwent successful mechanical thrombectomy of a right M1 occlusion with resultant TICI 2C flow.  She has had some clinical improvement post procedure now moving both upper extremities with improving  strength and following commands.       On exam: Right gaze preference, left facial droop, 3-4/5 left-sided weakness, improving, awake, A/O x3           A/P  :   Acute right MCA CVA   - Successful thrombectomy for right M1 occlusion, TICI 2C flow   - Discussed with IR, goal  - 140,  antihypertensive medication as needed   - Avoid hypotension, use IV 0.9% normal saline bolus if necessary.   - Neurochecks per post-thrombectomy protocol.   - MRI brain w/o  contrast ordered to be done 24 hr from tPA. If it is delayed for >6 hrs, instructed the RN to obtain CT head w/o contrast instead.   - Echocardiogram and tele, arrhythmia w/u   - Initiate daily aspirin 24 hours after thrombectomy and review of 24 MRI/CT   - Hold full dose anticoagulation pending repeat brain imaging and discussion of risk/benefit with neurology guarding timing   - High-intnsity statin therapy   - Maintain euglycemia <180mg/dL   - Consult PT/OT/Speech/Nutrition       The patient remains critically ill.  Critical care time = 40 minutes in directly providing and coordinating critical care and extensive data review.  No time overlap and excludes procedures.       ASSESSMENT AND PLAN:  71-year old female with PMhx significant for DVT (lower extremity; currently on reported 7-day course of Eliquis; unclear compliance), also with morbid obesity, recurrent UTIs, further details are limited who presented to [facility] on [facility]14[facility]22 for a chief complaint of Left sided weakness, onset 1350 om 3/14/22. On arrival here, stat CT head revealed no acute intracranial abnormality; CTA head/neck with Right MCA M1 thrombus. CT perfusion study with large (121 mL) mismatch/penumbra with no core. Given patient's concurrent use of Eliquis and indeterminate compliance/unknown dose, the decision was made to forgo IV thrombolytics and pursue only IR thrombectomy. Patient to IR now for Right MCA thrombectomy per Dr. Hawley; NIHSS remains at 15.      Recommendations/Plan:      -Admit to ICU per post thrombectomy protocol.   -Neuro assessment and VS per post thrombectomy protocol. **BP goal to be determined by TICI score (See below). Antihypertensives per ICU team.   -Obtain MRI Brain wo contrast.   -Plan to re-initiate Eliquis (versus ASA 81 mg PO q day) in coming 48 hours based on results of MRI (unless otherwise dictated per Neuro IR Dr. Hawley).   -Telemetry; currently SR. Screen for Afib/arrhythmia. Obtain TTE  (with bubble study, given recent DVTs of unclear etiology).   -Atorvastatin 80 mg PO q HS. Check lipid panel.   -Recommend aggressive BG management per primary team. Avoid IVF with Dextrose. BG goal 140-180. Check hemoglobin A1c.   -PT/OT/SLP eval and treat.   -Will follow up with results of the above and make additional recommendations accordingly. All other medical management per primary/ICU team.   -DVT PPX: SCDs.      After the endovascular intervention, please follow the following recommendations regarding blood pressure parameters:     If TICI 3: maintain systolic BP < 140  If TICI 2b: maintain systolic BP < 160  If TICI 2a or less, maintain systolic BP < 180 per tPA protocol     This is in an effort to minimize reperfusion injury and/or hemorrhagic conversion. Please keep SBP > 100 so as to not hypoperfuse.      The plan of care above has been discussed with Dr. Drew Monteiro. Please call with questions.      SACHI Butcher.  [facility]     Physical Medicine and Rehabilitation Consultation                                                                            Date of initial consultation: 3/16/2022  Consulting provider: Vilma Pearce MD  Reason for consultation: assess for acute inpatient rehab appropriateness  LOS: 2 Day(s)     Chief complaint: Right MCA stroke     HPI: The patient is a 71 y.o. right hand dominant female with a past medical history of lower extremity DVT on Eliquis, morbid obesity, recurrent UTIs;  who presented on 3/14/2022  3:14 PM with acute onset left-sided weakness.  Has been activated EMS.  Patient presented to [facility] with right MCA syndrome, NIH score 15.  CT head showed no acute intracranial abnormality, CTA head/neck found right MCA M1 thrombus, CT perfusion found large 121 mL mismatch.  Since patient was on Eliquis TPA was contraindicated.  Patient did receive IR thrombectomy with TICI 2C reperfusion achieved.      The patient currently reports  lethargy, some shortness of breath currently on 2 L nasal cannula in house but not at home, headache.  Patient is joined in the room by her spouse who is very supportive.  He states he is willing to build a ramp at their house.  Spouse states that their son is also home for a couple of weeks to help out, he is very handy as well, builds sets for movies in [employer]. NIH score improved to 7 today.      ROS  Pertinent positives are mentioned in the HPI, all others reviewed and are negative.     Social Hx:  1 SH  0 DIONNA  With: spouse. They live in [place], near [place]     Employment: Retired 3 months ago     THERAPY:  Restrictions: Swallow precautions, fall risks  PT: Functional mobility   Pending     OT: ADLs  3/16: Max assist LBD and toileting     SLP:   3/15: Minced and moist textures with thin liquids  3/16: Downgrade diet to PU4/TN0     ASSESSMENT:  Patient is a 71 y.o. female admitted with right MCA syndrome, status post thrombectomy with TICI 2C reperfusion     [facility] Code / Diagnosis to Support: 0001.1 - Stroke: Left Body Involvement (Right Brain)     Rehabilitation: Impaired ADLs and mobility  Patient is a good candidate for inpatient rehab based on needs for OT, and speech therapy.  Patient will also benefit from family training.  Patient has a good discharge situation which will be home with spouse.      Barriers to transfer include: Insurance authorization, TCCs to verify disposition, medical clearance and bed availability      All cases are subject to administrative review and recommendations may change     Additional Recommendations:  -Awaiting PT evaluation.  Patient is obese, with weakness in the bilateral lower extremities and left upper extremity.   is willing to build a ramp at home which would increase her chances of a successful discharge home, and was recommended to  while I was in the room today.  I suspect patient will do well with inpatient rehab.  She has good support from her   and son.  -TCC to confirm support   -Awaiting PT evaluation, continue OT and SLP while in-house  -Patient will be a good candidate for IPR if she is able to stand at mod assist or better.  She is currently requiring max assist for dressing, which has been can help with.  I prepared the family to prepare for a wheelchair discharge, however it is reasonable to assume that with hard work she could discharge home with a walker  -Antithrombotic/anticoagulation on hold until 3/21/2022.  Patient will need repeat CT head, then neurology may restart anticoagulation.  -Secondary stroke prevention with atorvastatin 80 mg nightly, aggressive blood glucose management, and blood pressure management  -Patient may benefit from low-dose amantadine for neuro stimulation for her lethargy if it persists.  -Awaiting CT chest abdomen pelvis, ordered to rule out underlying malignancy due to findings of multiple vascularized nodules involving the liver with elevated LFTs  -PMR to follow in the periphery for rehab appropriateness, please reach out with questions or request for medical management     Medical Complexity:  Stroke  Leukocytosis  Anemia     DVT PPX: SCDs        Thank you for allowing us to participate in the care of this patient.      Patient was seen for 113 minutes on unit/floor of which > 50% of time was spent on counseling and coordination of care regarding the above, including prognosis, risk reduction, benefits of treatment, and options for next stage of care.     Seferino Bear, DO   Physical Medicine and Rehabilitation         IMAGING:  MR brain 3/15/2022    Acute infarct in the right insula/basal ganglia/right caudate region.     Punctate foci of acute infarction involving the right frontoparietal cortex and subcortical region.     Multiple foci of acute infarction involving the bilateral inferior cerebellum.     Focal area of parenchymal hemorrhage in the right insular region with minimal surrounding edema and  "slight mass effect upon the right lateral ventricle.     Minimal subarachnoid hemorrhage in the sulci in the right temporoparietal region.         PROCEDURES:  3/14/2022  Abdirahman Zhu M.D.  IR thrombectomy.  Cerebral Angiogram showed a right M1 occlusion. TICI 2C recanalization achieved         Co-morbidities: See PMH  Potential Risk - Complications: Cognitive Impairment, Deep Vein Thrombosis, Dysphagia, Malnutrition, Pain, Perceptual Impairment, Pneumonia, Pressure Ulcer and Urinary Tract Infection  Level of Risk: High    Ongoing Medical Management Needed (Medical/Nursing Needs):   Patient Active Problem List    Diagnosis Date Noted   • Liver mass 03/16/2022   • DVT (deep venous thrombosis) ([facility]) 03/16/2022   • Obesity 03/16/2022   • Stroke (cerebrum) ([facility]) 03/14/2022   • Lung nodule < 6cm on CT 03/14/2022   • GERD (gastroesophageal reflux disease) 03/14/2022   • Paroxysmal SVT (supraventricular tachycardia) ([facility]) 03/14/2022   • Anemia 03/14/2022     A/O  Current Vital Signs:   Temperature: 37 °C (98.6 °F) Pulse: 84 Respiration: (!) 24 Blood Pressure : 122/55  Weight: 91 kg (200 lb 9.9 oz) Height: 167.6 cm (5' 5.98\")  Pulse Oximetry: 94 % O2 (LPM): 1      Completed Laboratory Reports:  Recent Labs     03/14/22  1516 03/15/22  0435 03/16/22  0341 03/17/22  0535   WBC 10.0 12.0* 11.5* 14.7*   HEMOGLOBIN 11.7* 9.0* 8.3* 9.2*   HEMATOCRIT 35.8* 28.6* 26.4* 28.5*   PLATELETCT 251 223 179 202   SODIUM 137 138 135 137   POTASSIUM 4.6 4.5 4.3 4.5   BUN 13 12 17 22   CREATININE 0.88 0.69 0.78 0.78   ALBUMIN 3.7 3.0* 2.9*  --    GLUCOSE 118* 119* 114* 110*   INR 1.41*  --   --   --      Additional Labs: Not Applicable    Prior Living Situation:   Housing / Facility: 1 Story House  Steps Into Home: 0  Steps In Home: 0  Lives with - Patient's Self Care Capacity: Spouse  Equipment Owned: None    Prior Level of Function / Living Situation:   Physical Therapy: Prior Services: Home-Independent  Housing / Facility: 1 Story " House  Steps Into Home: 0  Steps In Home: 0  Bathroom Set up: Walk In Shower  Equipment Owned: None  Lives with - Patient's Self Care Capacity: Spouse  Bed Mobility: Independent  Transfer Status: Independent  Ambulation: Independent  Distance Ambulation (Feet):  (community)  Assistive Devices Used: None  Stairs: Independent  Current Level of Function:   Gait Level Of Assist: Unable to Participate  Weight Bearing Status: no restrictions  Supine to Sit: Moderate Assist  Sit to Supine:  (NT, left in recliner)  Scooting: Minimal Assist (seated)  Rolling: Moderate Assist to Rt.  Comments: up to chair post session  Sit to Stand: Moderate Assist  Bed, Chair, Wheelchair Transfer: Maximal Assist  Transfer Method: Stand Step  Sitting in Chair: left in recliner  Sitting Edge of Bed: 15 min  Standin-3 min  Occupational Therapy:   Self Feeding: Independent  Grooming / Hygiene: Independent  Bathing: Independent  Dressing: Independent  Toileting: Independent  Medication Management: Independent  Laundry: Independent  Kitchen Mobility: Independent  Finances: Independent  Home Management: Independent  Shopping: Independent  Prior Level Of Mobility: Independent Without Device in Community  Driving / Transportation: Driving Independent  Prior Services: Home-Independent  Housing / Facility: [address] [place] [address]  Occupation (Pre-Hospital Vocational): Retired Due To Age  Current Level of Function:   Upper Body Dressing: Moderate Assist  Lower Body Dressing: Maximal Assist  Toileting: Maximal Assist  Speech Language Pathology:   Problem List: Dysphagia  Rehabilitation Prognosis/Potential: Good  Estimated Length of Stay: 14-21 days    Nursing:      [place] in Place    Scope/Intensity of Services Recommended:  Physical Therapy: 1 hr / day  5 days / week. Therapeutic Interventions Required: Maximize Endurance, Mobility, Strength and Safety  Occupational Therapy: 1 hr / day 5 days / week. Therapeutic Interventions Required: Maximize Self Care,  ADLs, IADLs and Energy Conservation  Speech & Language Pathology: 1 hr / day 5 days / week. Therapeutic Interventions Required: Maximize Cognition, Swallowing and Safety  Rehabilitation Nursin. Therapeutic Interventions Required: Monitor Pain, Skin, Vital Signs, Intake and Output, Labs, Safety, Aspiration Risk and Family Training  Rehabilitation Physician: 3 - 5 days / week. Therapeutic Interventions Required: Medical Management    She requires 24-hour rehabilitation nursing to manage bowel and bladder function, skin care, nutrition and fluid intake, pulmonary hygiene, pain control, safety, medication management and patient/family goals. In addition, rehabilitation nursing will reiterate and reinforce therapy skills and equipment use, including ADLs, as well as provide education to the patient and family. Ceci Mata is willing to participate in and is able to tolerate the proposed plan of care.    Rehabilitation Goals and Plan (Expected frequency & duration of treatment in the IRF):   Return to the Community, Modified Independent Level of Care, Minimal Assist Level of Care and Family Able to Provide  Assistance  Anticipated Date of Rehabilitation Admission: 22  Patient/Family oriented IRF level of care/facility/plan: Yes  Patient/Family willing to participate in IRF care/facility/plan: Yes  Patient able to tolerate IRF level of care proposed: Yes  Patient has potential to benefit IRF level of care proposed: Yes  Comments: Not Applicable    Special Needs or Precautions - Medical Necessity:  Safety Concerns/Precautions:  Fall Risk / High Risk for Falls, Balance and Bed / Chair Alarm  Pain Management  IV Site: Peripheral  Requires Oxygen  Current Medications:    Current Facility-Administered Medications Ordered in Epic   Medication Dose Route Frequency Provider Last Rate Last Admin   • omeprazole (FIRST-OMEPRAZOLE) 2 mg/mL oral susp 40 mg  40 mg Oral DAILY JAMES Latif.P.R.N.   40 mg at 22  0523   • metoprolol tartrate (LOPRESSOR) tablet 25 mg  25 mg Oral TWICE DAILY Rosi Pham A.P.R.N.   25 mg at 03/17/22 0523   • labetalol (NORMODYNE/TRANDATE) injection 10 mg  10 mg Intravenous Q10 MIN PRN Rosi Pham A.P.R.N.   10 mg at 03/16/22 1425   • senna-docusate (PERICOLACE or SENOKOT S) 8.6-50 MG per tablet 2 Tablet  2 Tablet Oral BID Vilma Pearce M.D.   2 Tablet at 03/17/22 0523    And   • polyethylene glycol/lytes (MIRALAX) PACKET 1 Packet  1 Packet Oral QDAY PRN Vilma Pearce M.D.        And   • magnesium hydroxide (MILK OF MAGNESIA) suspension 30 mL  30 mL Oral QDAY PRN Vilma Pearce M.D.        And   • bisacodyl (DULCOLAX) suppository 10 mg  10 mg Rectal QDAY PRN Vilma Pearce M.D.       • Respiratory Therapy Consult   Nebulization Continuous RT Vilma Pearce M.D.       • acetaminophen (Tylenol) tablet 650 mg  650 mg Oral Q6HRS PRN Vilma Pearce M.D.   650 mg at 03/16/22 2053   • atorvastatin (LIPITOR) tablet 80 mg  80 mg Oral Q EVENING Vilma Pearce M.D.   80 mg at 03/16/22 1824   • hydrALAZINE (APRESOLINE) injection 10 mg  10 mg Intravenous Q6HRS PRN Rosi Pham A.P.R.N.       • morphine 4 MG/ML injection 1 mg  1 mg Intravenous Q4HRS PRN Andressa Clement M.D.   1 mg at 03/16/22 0826     No current [facility]-ordered outpatient medications on file.     Diet:   DIET ORDERS (From admission to next 24h)     Start     Ordered    03/17/22 1006  Diet Order Diet: Level 4 - Pureed (Crush meds in puree); Liquid level: Level 0 - Thin; Tray Modifications (optional): SLP - 1:1 Supervision by Nursing, SLP - Deliver to Nursing Station  START AT BREAKFAST        Question Answer Comment   Diet: Level 4 - Pureed Crush meds in puree   Liquid level Level 0 - Thin    Tray Modifications (optional) SLP - 1:1 Supervision by Nursing    Tray Modifications (optional) SLP - Deliver to Nursing Station        03/17/22 1006                Anticipated Discharge Destination / Patient/Family Goal:  Destination: Home  with Assistance Support System: Spouse and Family   Anticipated home health services: OT, PT, SLP, Nursing, Social Work and Aide  Previously used HH service/ provider: Not Applicable  Anticipated DME Needs: Walker, Wheelchair, Ramp, Commode and Life Line  Outpatient Services: OT and PT  Alternative resources to address additional identified needs:   No future appointments.  Pre-Screen Completed: 3/17/2022 10:38 AM Radha Munguia

## 2022-03-17 NOTE — PROGRESS NOTES
Hospital Medicine Daily Progress Note    Date of Service  3/17/2022    Chief Complaint  Ceci Mata is a 71 y.o. female admitted 3/14/2022 with left-sided weakness    Hospital Course  71 y.o. female who presented 3/14/2022 with recent history of DVT who has been on Eliquis PSVT and GERD presented to the emergency department for evaluation of left-sided weakness and facial droop.  She was evaluated by neurology and her initial work-up revealed a right M1 occlusion.  She was not a candidate for lytic therapy due to anticoagulation and she underwent thrombectomy by interventional radiology TICI 2C and was admitted to the intensive care unit.  Follow-up MRI revealed acute ischemic infarct in the right temporal region right MCA territory with mild to moderate hemorrhagic conversion    Interval Problem Update    Patient is more alert today  CT chest abdomen pelvis reviewed with pancreatic mass and likely liver mets  Complains of mild headache      I have personally seen and examined the patient at bedside. I discussed the plan of care with patient, family and bedside RN.    Consultants/Specialty  critical care and neurology    Code Status  Full Code    Disposition  Patient is medically cleared for discharge.   Anticipate discharge to to skilled nursing facility.  I have placed the appropriate orders for post-discharge needs.    Review of Systems  Review of Systems   Constitutional: Negative for fever.   Neurological: Positive for focal weakness, weakness and headaches.   All other systems reviewed and are negative.       Physical Exam  Temp:  [36.5 °C (97.7 °F)-37.5 °C (99.5 °F)] 36.7 °C (98 °F)  Pulse:  [78-96] 96  Resp:  [18-29] 24  BP: (113-150)/(53-64) 134/59  SpO2:  [91 %-98 %] 97 %    Physical Exam  Vitals and nursing note reviewed.   Constitutional:       General: She is not in acute distress.  HENT:      Head: Normocephalic and atraumatic.      Nose: Nose normal. No rhinorrhea.      Mouth/Throat:       Pharynx: No oropharyngeal exudate or posterior oropharyngeal erythema.   Eyes:      General: No scleral icterus.        Right eye: No discharge.         Left eye: No discharge.   Cardiovascular:      Rate and Rhythm: Normal rate and regular rhythm.      Heart sounds: Normal heart sounds. No murmur heard.    No friction rub. No gallop.   Pulmonary:      Effort: Pulmonary effort is normal. No respiratory distress.      Breath sounds: Normal breath sounds. No stridor. No wheezing, rhonchi or rales.   Chest:      Chest wall: No tenderness.   Abdominal:      General: Bowel sounds are normal. There is no distension.      Palpations: Abdomen is soft. There is no mass.      Tenderness: There is no abdominal tenderness. There is no rebound.   Musculoskeletal:         General: No swelling or tenderness.      Cervical back: Neck supple. No rigidity.   Skin:     General: Skin is warm and dry.      Coloration: Skin is not cyanotic or jaundiced.      Nails: There is no clubbing.   Neurological:      General: No focal deficit present.      Mental Status: She is alert and oriented to person, place, and time.      Cranial Nerves: Cranial nerve deficit present.      Motor: Weakness present.      Comments: Left facial droop  Left hemiparesis 4/5   Psychiatric:         Mood and Affect: Mood normal.         Behavior: Behavior normal.         Fluids    Intake/Output Summary (Last 24 hours) at 3/17/2022 1421  Last data filed at 3/17/2022 1139  Gross per 24 hour   Intake 180 ml   Output 150 ml   Net 30 ml       Laboratory  Recent Labs     03/15/22  0435 03/16/22  0341 03/17/22  0535   WBC 12.0* 11.5* 14.7*   RBC 2.72* 2.54* 2.79*   HEMOGLOBIN 9.0* 8.3* 9.2*   HEMATOCRIT 28.6* 26.4* 28.5*   .1* 103.9* 102.2*   MCH 33.1* 32.7 33.0   MCHC 31.5* 31.4* 32.3*   RDW 51.5* 51.2* 49.8   PLATELETCT 223 179 202   MPV 10.0 10.4 10.3     Recent Labs     03/15/22  0435 03/16/22  0341 03/17/22  0535   SODIUM 138 135 137   POTASSIUM 4.5 4.3 4.5    CHLORIDE 105 104 102   CO2 21 23 23   GLUCOSE 119* 114* 110*   BUN 12 17 22   CREATININE 0.69 0.78 0.78   CALCIUM 8.3* 8.1* 8.3*     Recent Labs     03/14/22  1516   APTT 30.2   INR 1.41*         Recent Labs     03/15/22  0435   TRIGLYCERIDE 125   HDL 31*   *       Imaging  CT-CHEST,ABDOMEN,PELVIS WITH   Final Result      1.  Hypodense pancreatic tail mass likely represents primary malignancy.      2.  Multiple hepatic masses are consistent with metastasis.      3.  Splenic hypodense masses likely represent metastasis.      4.  Diverticulosis.      5.  Atherosclerosis         EC-ECHOCARDIOGRAM COMPLETE W/O CONT   Final Result      MR-BRAIN-W/O   Final Result         Acute infarct in the right insula/basal ganglia/right caudate region.      Punctate foci of acute infarction involving the right frontoparietal cortex and subcortical region.      Multiple foci of acute infarction involving the bilateral inferior cerebellum.      Focal area of parenchymal hemorrhage in the right insular region with minimal surrounding edema and slight mass effect upon the right lateral ventricle.      Minimal subarachnoid hemorrhage in the sulci in the right temporoparietal region.      DX-CHEST-PORTABLE (1 VIEW)   Final Result      1.  No acute cardiac or pulmonary abnormalities are identified.      IR-THROMBO MECHANICAL ARTERY,INIT   Final Result         71-year-old patient who presented with acute onset of left-sided weakness and facial droop with an NIH stroke scale of 15 underwent emergent mechanical thrombectomy for an ICA terminus occlusion on the right side. The final angiographic images    demonstrate complete recanalization of the ICA, MCA on the right side with minimal distal embolization to a cortical M4 branch each in the superior and in the inferior division.      Final recanalization score: TICI 2C      I, Abdirahman Zhu was physically present and participated during the entire procedure of the IR-THROMBO  MECHANICAL ARTERY,INIT.                  CT-CEREBRAL PERFUSION ANALYSIS   Final Result      1.  Cerebral blood flow less than 30% likely representing completed infarct = 0 mL.      2.  T Max more than 6 seconds likely representing combination of completed infarct and ischemia = 121 mL.      3.  Mismatched volume likely representing ischemic brain/penumbra = infinite      4.  Please note that the cerebral perfusion was performed on the limited brain tissue around the basal ganglia region. Infarct/ischemia outside the CT perfusion sections can be missed in this study.      CT-CTA NECK WITH & W/O-POST PROCESSING   Final Result      1.  Mild bilateral atherosclerosis with less than 50% ICA stenosis.   2.  There is a nonspecific 4 mm posterior right upper lobe lung nodule.      Fleischner Society pulmonary nodule recommendations:   Low Risk: No routine follow-up      High Risk: Optional CT at 12 months      Comments: Nodules less than 6 mm do not require routine follow-up, but certain patients at high risk with suspicious nodule morphology, upper lobe location, or both may warrant 12-month follow-up.      Low Risk - Minimal or absent history of smoking and of other known risk factors.      High Risk - History of smoking or of other known risk factors.      Note: These recommendations do not apply to lung cancer screening, patients with immunosuppression, or patients with known primary cancer.      Fleischner Society 2017 Guidelines for Management of Incidentally Detected Pulmonary Nodules in Adults         CT-CTA HEAD WITH & W/O-POST PROCESS   Final Result      1.  There is a right M1 occlusion. Findings were discussed with Narcisa Chapman by Dr. Zhu the neurointerventional radiologist who has already seen the exam on 3/14/2022 at 3:35 PM.      CT-HEAD W/O   Final Result      1.  There are periventricular and subcortical white matter changes present.  This finding is nonspecific and could be from indeterminate age  small vessel ischemia, demyelination, or gliosis. MRI would be more sensitive for further evaluation.   2.  Mild paranasal sinus disease.                 Assessment/Plan  * Stroke (cerebrum) (HCC)  Assessment & Plan  Status post right M1 thrombectomy  With mild to moderate ICH on MRI  Blood pressure control goal -130  PT/OT/SLP  Neurology recommending to hold antiplatelet/antithrombotics through 3/21/2022    Continue atorvastatin  Declined by rehab SNF referral placed and discussed with case management    Obesity  Assessment & Plan  Body mass index is 31.95 kg/m².     DVT (deep venous thrombosis) (HCC)  Assessment & Plan  Recent DVT was maintained on Eliquis  Given ICH neurology recommending holding antiplatelet/anticoagulant for 5-7 days    Liver mass  Assessment & Plan  CT findings concerning for metastatic pancreatic cancer    Reviewed CT results with patient and son at bedside  Discussed need for further work-up with biopsy and oncology referral once recovered from this acute hospitalization patient and son understand and agree with outlined plan  Place referral for intake oncology coordinator    Anemia- (present on admission)  Assessment & Plan  Hemoglobin 9.2 stable      Paroxysmal SVT (supraventricular tachycardia) (HCC)  Assessment & Plan  Continue metoprolol    GERD (gastroesophageal reflux disease)- (present on admission)  Assessment & Plan  Continue Prilosec       VTE prophylaxis: SCDs/TEDs    I have performed a physical exam and reviewed and updated ROS and Plan today (3/17/2022). In review of yesterday's note (3/16/2022), there are no changes except as documented above.

## 2022-03-17 NOTE — DISCHARGE PLANNING
1038: R MCA stroke - Per physiatry patient is a good candidate for IPR, patient transferred with PT modA sit to stand. Spouse to build a ramp, good support from family. Per neurology plan to restart antithrombotics/anticoagulation on Monday 3/21 following stable repeat head CT.     Per attending patient is medically cleared for rehab, Dr Howe at [facility] to review.     1121: Patient has been declined by Dr Howe, patient demonstrating poor tolerance for IPR level of therapy and not expected to make a significant improvement given CT findings. Discussed with attending and cm, plan for SNF and OP work up for pancreatic mass. TCC will no longer follow, please call with any questions [phone].

## 2022-03-18 NOTE — PROGRESS NOTES
Pt c/o nausea when having BM, small emesis present. Notified Dr. Arabella Talbert and zofran ordered on MAR. Once medication pulled pt stated she no longer was nauseous. Zofran wasted.

## 2022-03-18 NOTE — THERAPY
Physical Therapy   Daily Treatment     Patient Name: Ceci Mata  Age:  71 y.o., Sex:  female  Medical Record #: [id number]  Today's Date: 3/18/2022     Precautions  Precautions: Fall Risk;Swallow Precautions ( See Comments)  Comments: L inattention    Assessment    Pt seen for PT treatment session, lethargic upon PT arrival but more awake with time EOB and motivated to work with PT. Pt demonstrated improved activity tolerance as noted below. Overall very receptive to cues.   Plan    Continue current treatment plan.    DC Equipment Recommendations: Unable to determine at this time  Discharge Recommendations: Recommend post-acute placement for additional physical therapy services prior to discharge home       03/18/22 1204   Precautions   Precautions Fall Risk;Swallow Precautions ( See Comments)   Comments L inattention   Vitals   O2 Delivery Device Silicone Nasal Cannula   Pain 0 - 10 Group   Therapist Pain Assessment During Activity;Nurse Notified  (no pain reported)   Cognition    Speech/ Communication Dysarthric   Level of Consciousness Alert  (initally lethargic but more alert with time EOB)   Attention Impaired   Sitting Lower Body Exercises   Sitting Lower Body Exercises Yes   Long Arc Quad 1 set of 10;Bilateral   Comments extra time and cues to complete full range LAQ with L LE   Balance   Sitting Balance (Static) Fair   Sitting Balance (Dynamic) Fair -   Standing Balance (Static) Poor   Standing Balance (Dynamic) Poor -   Weight Shift Sitting Fair   Weight Shift Standing Poor   Skilled Intervention Verbal Cuing;Sequencing   Comments with HHA   Gait Analysis   Gait Level Of Assist Unable to Participate   Comments steps for pivot transfer only   Bed Mobility    Supine to Sit Maximal Assist  (to R)   Sit to Supine   (seated in recliner at end of session)   Scooting Minimal Assist   Rolling Moderate Assist to Rt.   Skilled Intervention Verbal Cuing;Sequencing;Compensatory Strategies   Functional Mobility   Sit  to Stand Moderate Assist  (x3 trials)   Bed, Chair, Wheelchair Transfer Moderate Assist   Transfer Method Stand Step   Skilled Intervention Verbal Cuing;Tactile Cuing;Postural Facilitation;Compensatory Strategies   Comments facilitation to weight shift and complete advancement of L LE.   ICU Target Mobility Level   ICU Mobility - Targeted Level Level 3B   How much difficulty does the patient currently have...   Turning over in bed (including adjusting bedclothes, sheets and blankets)? 1   Sitting down on and standing up from a chair with arms (e.g., wheelchair, bedside commode, etc.) 1   Moving from lying on back to sitting on the side of the bed? 1   How much help from another person does the patient currently need...   Moving to and from a bed to a chair (including a wheelchair)? 2   Need to walk in a hospital room? 2   Climbing 3-5 steps with a railing? 1   6 clicks Mobility Score 8   Activity Tolerance   Sitting in Chair seated in recliner at end of session   Sitting Edge of Bed 10 min   Standing 3-4 min   Short Term Goals    Short Term Goal # 1 Patient will move supine<>sitting EOB without bed features with supervision within 6tx in order to get in/out of bed   Goal Outcome # 1 goal not met   Short Term Goal # 2 Patient will move sitting<>standing with supervision within 6tx in order to initiate transfers and gait   Goal Outcome # 2 Goal not met   Short Term Goal # 3 Patient will ambulate 50ft with supervision within 6tx in order to access environment   Goal Outcome # 3 Goal not met   Short Term Goal # 4 Patient will ascend/descend 4 steps with min A within 6tx in order to enter/exit home   Goal Outcome # 4 Goal not met   Anticipated Discharge Equipment and Recommendations   DC Equipment Recommendations Unable to determine at this time   Discharge Recommendations Recommend post-acute placement for additional physical therapy services prior to discharge home   Interdisciplinary Plan of Care Collaboration   IDT  Collaboration with  Nursing   Patient Position at End of Therapy Seated;Chair Alarm On;Call Light within Reach;Tray Table within Reach;Phone within Reach;Family / Friend in Room   Collaboration Comments RN aware of PT session, sister present

## 2022-03-18 NOTE — THERAPY
"Speech Language Pathology  Daily Treatment     Patient Name: Ceci Mata  Age:  71 y.o., Sex:  female  Medical Record #: [id number]  Today's Date: 3/18/2022     Precautions  Precautions: Fall Risk,Swallow Precautions ( See Comments)  Comments: L inattention    Assessment  Patient seen this date for dysphagia tx session. Patient currently on PU4/TN0 diet and seen with end of lunch meal. Patient awake, alert, and up in the chair. Patient consumed bites of purees and several sips of thins via cup sip. Patient consumed all PO trials with no s/sx of aspiration. No oral residue noted post-swallow w/ purees today. Patient was given handout on dysphagia exercises and instructed on proper technique of each. Patient completed 2-3 reps of each with \"fair\" accuracy w/ significant left-sided oral motor weakness still noted and left-sided visual neglect noted as well.     Recommend patient continue PU4/TN0 diet with assistance as needed. Crush meds in puree. No straws. Please encourage patient to complete several rounds of dysphagia exercises per day.     Plan  Continue current treatment plan.    Discharge Recommendations: Recommend post-acute placement for additional speech therapy services prior to discharge home     Objective     03/18/22 1503   Precautions   Precautions Fall Risk;Swallow Precautions ( See Comments)   Vitals   O2 (LPM) 2   O2 Delivery Device Silicone Nasal Cannula   Patient / Family Goals   Patient / Family Goal #1 \"I am so thirsty\"   Goal #1 Outcome Goal met   Short Term Goals   Short Term Goal # 1 Pt will consume MM5/TN0 diet with no overt s/sx of aspiration.   Goal Outcome # 1 Progressing slower than expected   Short Term Goal # 2 Patient will consume PU4/TN0 diet with assistance as needed with no overt s/sx of aspiration.   Anticipated Discharge Needs   Discharge Recommendations Recommend post-acute placement for additional speech therapy services prior to discharge home     "

## 2022-03-18 NOTE — CARE PLAN
The patient is Stable - Low risk of patient condition declining or worsening    Shift Goals  Clinical Goals: increased mobility and participation  Patient Goals: Comfort  Family Goals: Updates from Doctor    Progress made toward(s) clinical / shift goals:    Problem: Optimal Care of the Stroke Patient  Goal: Optimal emergency care for the stroke patient  Outcome: Progressing     Problem: Discharge Planning - Stroke  Goal: Ensure Stroke Core Measures are met prior to discharge  Outcome: Progressing     Problem: Neuro Status  Goal: Neuro status will remain stable or improve  Outcome: Progressing     Problem: Pain - Standard  Goal: Alleviation of pain or a reduction in pain to the patient’s comfort goal  Outcome: Progressing     Problem: Skin Integrity  Goal: Skin integrity is maintained or improved  Outcome: Progressing

## 2022-03-18 NOTE — THERAPY
Occupational Therapy  Daily Treatment     Patient Name: Ceci Mata  Age:  71 y.o., Sex:  female  Medical Record #: [id number]  Today's Date: 3/18/2022     Precautions  Precautions: Fall Risk,Swallow Precautions ( See Comments)  Comments: L inattention    Assessment    Pt seen for OT session. Progressing with activity tolerance and sitting balance. Educated pt and family on joint protection/support in sitting for L shoulder d/t weakness to prevent subluxation. Pt lethargic during session. Continues to be limited by decreased functional mobility, activity tolerance, cognition, sensation, strength, AROM, coordination, balance, and pain which are currently affecting pt's ability to complete ADLs/IADLs at baseline. Will continue to follow.     Plan    Continue current treatment plan.    DC Equipment Recommendations: Unable to determine at this time  Discharge Recommendations: Recommend post-acute placement for additional occupational therapy services prior to discharge home     Objective     03/18/22 1120   Precautions   Precautions Fall Risk;Swallow Precautions ( See Comments)   Comments L inattention   Vitals   O2 (LPM) 2   O2 Delivery Device Silicone Nasal Cannula   Pain 0 - 10 Group   Therapist Pain Assessment During Activity;Post Activity Pain Same as Prior to Activity;Nurse Notified  (no c/o pain)   Cognition    Cognition / Consciousness X   Speech/ Communication Dysarthric   Level of Consciousness   (lethargic during session)   Safety Awareness Impaired   Attention Impaired   Sequencing Impaired   Passive ROM Upper Body   Passive ROM Upper Body WDL   Active ROM Upper Body   Active ROM Upper Body  X   Comments Full AROM in hand and wrist; elbow and shoulder limited by weakness   Strength Upper Body   Upper Body Strength  X   Comments RUE WLF; LUE 4-/5 distal grasp, elbow 2+/5, shoulder flex/abduction 2/5   Upper Body Muscle Tone   Upper Body Muscle Tone  X   Lt Upper Extremity Muscle Tone Hypotonic   Sitting  Upper Body Exercises   Sitting Upper Body Exercises Yes   Comments L AAROM bicep curls x3, shoulder abduction x3, flexion x3   Other Treatments   Other Treatments Provided Educated pt and family on joint protection/support in sitting for L shoulder d/t weakness   Balance   Sitting Balance (Static) Fair   Sitting Balance (Dynamic) Fair -   Standing Balance (Static) Poor   Standing Balance (Dynamic) Poor -   Weight Shift Sitting Fair   Weight Shift Standing Poor   Skilled Intervention Verbal Cuing;Tactile Cuing;Compensatory Strategies;Facilitation;Sequencing   Comments w/HHA   Bed Mobility    Supine to Sit Maximal Assist   Sit to Supine   (left in recliner at end of session)   Scooting Moderate Assist   Rolling Moderate Assist to Rt.   Skilled Intervention Verbal Cuing;Tactile Cuing;Sequencing;Postural Facilitation;Facilitation;Compensatory Strategies   Activities of Daily Living   Eating Supervision  (drinking)   Grooming Minimal Assist;Seated;Moderate Assist  (oral care min A; brushing hair mod A. use of RUE only)   Lower Body Dressing Maximal Assist   Toileting Moderate Assist  (purewick; but likely could get to BSC with x2 ppl)   Skilled Intervention Verbal Cuing;Tactile Cuing;Sequencing;Postural Facilitation;Facilitation;Compensatory Strategies   Comments Didn't attempt to use LUE for any ADL task   Functional Mobility   Sit to Stand Moderate Assist  (x3)   Bed, Chair, Wheelchair Transfer Moderate Assist   Toilet Transfers   (declined need)   Transfer Method Stand Step   Mobility req max v/cs for sequencing steps to chair and assist with weight shifting   Skilled Intervention Verbal Cuing;Tactile Cuing;Sequencing;Postural Facilitation;Facilitation;Compensatory Strategies   ICU Target Mobility Level   ICU Mobility - Targeted Level Level 3B   Visual Perception   Visual Perception  X   Visual Scanning Impaired   Comments L inattention and minimal ability for eyes to cross midline; compensates by turning head    Activity Tolerance   Comments limited by fatigue and cognition; appears lethargic. Left in chair   Patient / Family Goals   Patient / Family Goal #1 to get stronger   Short Term Goals   Short Term Goal # 1 pt will complete grooming seated w/set up   Goal Outcome # 1 Progressing as expected   Short Term Goal # 2 pt will complete txf to BSC w/min A   Goal Outcome # 2 Progressing as expected   Short Term Goal # 3 pt will complete UB dressing w/spv   Goal Outcome # 3 Progressing slower than expected   Short Term Goal # 4 LB dressing with min A   Education Group   Education Provided Upper Extremity Strengthening;Joint Protection;Transfers;Pathology of bedrest   Joint Protection Patient Response Patient;Family;Acceptance;Explanation;Verbal Demonstration;Reinforcement Needed   UE Strengthening Patient Response Patient;Acceptance;Explanation;Action Demonstration;Reinforcement Needed   Transfers Patient Response Patient;Acceptance;Explanation;Reinforcement Needed;No Learning Evidence   Pathology of Bedrest Patient Response Patient;Acceptance;Explanation;No Learning Evidence;Reinforcement Needed   Anticipated Discharge Equipment and Recommendations   DC Equipment Recommendations Unable to determine at this time   Discharge Recommendations Recommend post-acute placement for additional occupational therapy services prior to discharge home   Interdisciplinary Plan of Care Collaboration   IDT Collaboration with  Nursing   Patient Position at End of Therapy Seated;Chair Alarm On;Call Light within Reach;Tray Table within Reach;Phone within Reach;Family / Friend in Room   Collaboration Comments OT report and recs   Session Information   Date / Session Number  3/18 #2 (2/4, 3/22)   Priority 3  (CVA)

## 2022-03-18 NOTE — PROGRESS NOTES
Hospital Medicine Daily Progress Note    Date of Service  3/18/2022    Chief Complaint  Ceci Mata is a 71 y.o. female admitted 3/14/2022 with left-sided weakness    Hospital Course  71 y.o. female who presented 3/14/2022 with recent history of DVT who has been on Eliquis PSVT and GERD presented to the emergency department for evaluation of left-sided weakness and facial droop.  She was evaluated by neurology and her initial work-up revealed a right M1 occlusion.  She was not a candidate for lytic therapy due to anticoagulation and she underwent thrombectomy by interventional radiology TICI 2C and was admitted to the intensive care unit.  Follow-up MRI revealed acute ischemic infarct in the right temporal region right MCA territory with mild to moderate hemorrhagic conversion    Interval Problem Update    Patient is afebrile  On 1 L nasal cannula  Mild headache  Worked with PT/OT  Tolerating p.o. intake with poor appetite      I have personally seen and examined the patient at bedside. I discussed the plan of care with patient, family and bedside RN.    Consultants/Specialty  critical care and neurology    Code Status  Full Code    Disposition  Patient is medically cleared for discharge.   Anticipate discharge to to skilled nursing facility.  I have placed the appropriate orders for post-discharge needs.    Review of Systems  Review of Systems   Constitutional: Negative for chills and fever.   Respiratory: Negative for cough and shortness of breath.    Cardiovascular: Negative for chest pain.   Gastrointestinal: Negative for abdominal pain, nausea and vomiting.   Neurological: Positive for focal weakness.   All other systems reviewed and are negative.       Physical Exam  Temp:  [36.4 °C (97.5 °F)-36.8 °C (98.3 °F)] 36.4 °C (97.6 °F)  Pulse:  [] 87  Resp:  [18-30] 21  BP: ()/(48-67) 123/57  SpO2:  [92 %-98 %] 98 %    Physical Exam  Vitals and nursing note reviewed.   Constitutional:       General:  She is not in acute distress.  HENT:      Head: Normocephalic and atraumatic.      Nose: Nose normal. No rhinorrhea.      Mouth/Throat:      Pharynx: No oropharyngeal exudate or posterior oropharyngeal erythema.   Eyes:      General: No scleral icterus.        Right eye: No discharge.         Left eye: No discharge.   Cardiovascular:      Rate and Rhythm: Normal rate and regular rhythm.      Heart sounds: Normal heart sounds. No murmur heard.    No friction rub. No gallop.   Pulmonary:      Effort: Pulmonary effort is normal. No respiratory distress.      Breath sounds: Normal breath sounds. No stridor. No wheezing, rhonchi or rales.   Chest:      Chest wall: No tenderness.   Abdominal:      General: Bowel sounds are normal. There is no distension.      Palpations: Abdomen is soft. There is no mass.      Tenderness: There is no abdominal tenderness. There is no rebound.   Musculoskeletal:         General: No swelling or tenderness.      Cervical back: Neck supple. No rigidity.   Skin:     General: Skin is warm and dry.      Coloration: Skin is not cyanotic or jaundiced.      Nails: There is no clubbing.   Neurological:      General: No focal deficit present.      Mental Status: She is alert and oriented to person, place, and time.      Cranial Nerves: Cranial nerve deficit present.      Motor: Weakness present.      Comments: Left hemiparesis 4/5   Psychiatric:         Mood and Affect: Mood normal.         Behavior: Behavior normal.         Fluids    Intake/Output Summary (Last 24 hours) at 3/18/2022 1444  Last data filed at 3/18/2022 1200  Gross per 24 hour   Intake 800 ml   Output 550 ml   Net 250 ml       Laboratory  Recent Labs     03/16/22  0341 03/17/22  0535   WBC 11.5* 14.7*   RBC 2.54* 2.79*   HEMOGLOBIN 8.3* 9.2*   HEMATOCRIT 26.4* 28.5*   .9* 102.2*   MCH 32.7 33.0   MCHC 31.4* 32.3*   RDW 51.2* 49.8   PLATELETCT 179 202   MPV 10.4 10.3     Recent Labs     03/16/22  0341 03/17/22  0535   SODIUM 135  137   POTASSIUM 4.3 4.5   CHLORIDE 104 102   CO2 23 23   GLUCOSE 114* 110*   BUN 17 22   CREATININE 0.78 0.78   CALCIUM 8.1* 8.3*                   Imaging  CT-CHEST,ABDOMEN,PELVIS WITH   Final Result      1.  Hypodense pancreatic tail mass likely represents primary malignancy.      2.  Multiple hepatic masses are consistent with metastasis.      3.  Splenic hypodense masses likely represent metastasis.      4.  Diverticulosis.      5.  Atherosclerosis         EC-ECHOCARDIOGRAM COMPLETE W/O CONT   Final Result      MR-BRAIN-W/O   Final Result         Acute infarct in the right insula/basal ganglia/right caudate region.      Punctate foci of acute infarction involving the right frontoparietal cortex and subcortical region.      Multiple foci of acute infarction involving the bilateral inferior cerebellum.      Focal area of parenchymal hemorrhage in the right insular region with minimal surrounding edema and slight mass effect upon the right lateral ventricle.      Minimal subarachnoid hemorrhage in the sulci in the right temporoparietal region.      DX-CHEST-PORTABLE (1 VIEW)   Final Result      1.  No acute cardiac or pulmonary abnormalities are identified.      IR-THROMBO MECHANICAL ARTERY,INIT   Final Result         71-year-old patient who presented with acute onset of left-sided weakness and facial droop with an NIH stroke scale of 15 underwent emergent mechanical thrombectomy for an ICA terminus occlusion on the right side. The final angiographic images    demonstrate complete recanalization of the ICA, MCA on the right side with minimal distal embolization to a cortical M4 branch each in the superior and in the inferior division.      Final recanalization score: TICI 2C      I, Abdirahman Lejosesam was physically present and participated during the entire procedure of the IR-THROMBO MECHANICAL ARTERY,INIT.                  CT-CEREBRAL PERFUSION ANALYSIS   Final Result      1.  Cerebral blood flow less than 30%  likely representing completed infarct = 0 mL.      2.  T Max more than 6 seconds likely representing combination of completed infarct and ischemia = 121 mL.      3.  Mismatched volume likely representing ischemic brain/penumbra = infinite      4.  Please note that the cerebral perfusion was performed on the limited brain tissue around the basal ganglia region. Infarct/ischemia outside the CT perfusion sections can be missed in this study.      CT-CTA NECK WITH & W/O-POST PROCESSING   Final Result      1.  Mild bilateral atherosclerosis with less than 50% ICA stenosis.   2.  There is a nonspecific 4 mm posterior right upper lobe lung nodule.      Fleischner Society pulmonary nodule recommendations:   Low Risk: No routine follow-up      High Risk: Optional CT at 12 months      Comments: Nodules less than 6 mm do not require routine follow-up, but certain patients at high risk with suspicious nodule morphology, upper lobe location, or both may warrant 12-month follow-up.      Low Risk - Minimal or absent history of smoking and of other known risk factors.      High Risk - History of smoking or of other known risk factors.      Note: These recommendations do not apply to lung cancer screening, patients with immunosuppression, or patients with known primary cancer.      Fleischner Society 2017 Guidelines for Management of Incidentally Detected Pulmonary Nodules in Adults         CT-CTA HEAD WITH & W/O-POST PROCESS   Final Result      1.  There is a right M1 occlusion. Findings were discussed with Narcisa Chapman by Dr. Zhu the neurointerventional radiologist who has already seen the exam on 3/14/2022 at 3:35 PM.      CT-HEAD W/O   Final Result      1.  There are periventricular and subcortical white matter changes present.  This finding is nonspecific and could be from indeterminate age small vessel ischemia, demyelination, or gliosis. MRI would be more sensitive for further evaluation.   2.  Mild paranasal sinus  disease.                 Assessment/Plan  * Stroke (cerebrum) (HCC)  Assessment & Plan  Status post right M1 thrombectomy  With mild to moderate ICH on MRI  Blood pressure control goal -130  PT/OT/SLP  Neurology recommending to hold antiplatelet/antithrombotics through 3/21/2022    Continue atorvastatin  Evaluated by physiatry declined by rehab  SNF referral discussed with patient and family importance to proceed with chores to initiate referrals  Neurology recommended Zio patch on discharge    Obesity  Assessment & Plan  Body mass index is 31.95 kg/m².     DVT (deep venous thrombosis) (HCC)  Assessment & Plan  Recent DVT was maintained on Eliquis  Given ICH neurology recommending holding antiplatelet/anticoagulant and consider resuming if repeat head CT is negative on 3/21    Liver mass  Assessment & Plan  CT findings concerning for metastatic pancreatic cancer      Discussed need for further work-up with biopsy and oncology referral once recovered from this acute hospitalization   referral for intake oncology coordinator    Reviewed findings and plan of care with patient sister at the bedside and also hold son at patient request and discussed again with him plan of care    Anemia- (present on admission)  Assessment & Plan  No clinical signs of bleeding monitor CBC    Paroxysmal SVT (supraventricular tachycardia) (HCC)  Assessment & Plan  Continue metoprolol    GERD (gastroesophageal reflux disease)- (present on admission)  Assessment & Plan  Continue Prilosec       VTE prophylaxis: SCDs/TEDs    I have performed a physical exam and reviewed and updated ROS and Plan today (3/18/2022). In review of yesterday's note (3/17/2022), there are no changes except as documented above.

## 2022-03-19 PROBLEM — D72.829 LEUKOCYTOSIS: Status: ACTIVE | Noted: 2022-01-01

## 2022-03-19 NOTE — PROGRESS NOTES
Hospital Medicine Daily Progress Note    Date of Service  3/19/2022    Chief Complaint  Ceci Mata is a 71 y.o. female admitted 3/14/2022 with left-sided weakness    Hospital Course  71 y.o. female who presented 3/14/2022 with recent history of DVT who has been on Eliquis PSVT and GERD presented to the emergency department for evaluation of left-sided weakness and facial droop.  She was evaluated by neurology and her initial work-up revealed a right M1 occlusion.  She was not a candidate for lytic therapy due to anticoagulation and she underwent thrombectomy by interventional radiology TICI 2C and was admitted to the intensive care unit.  Follow-up MRI revealed acute ischemic infarct in the right temporal region right MCA territory with mild to moderate hemorrhagic conversion    Interval Problem Update    Patient is afebrile  On 2 L nasal cannula  WBC elevated  Denies abdominal pain nausea  No cough or dyspnea   and daughter at bedside      I have personally seen and examined the patient at bedside. I discussed the plan of care with patient, family and bedside RN.    Consultants/Specialty  critical care and neurology    Code Status  Full Code    Disposition  Patient is medically cleared for discharge.   Anticipate discharge to to skilled nursing facility.  I have placed the appropriate orders for post-discharge needs.    Review of Systems  Review of Systems   Constitutional: Negative for chills and fever.   Respiratory: Negative for cough and shortness of breath.    Cardiovascular: Negative for chest pain.   Gastrointestinal: Negative for abdominal pain and nausea.   Neurological: Positive for focal weakness.   All other systems reviewed and are negative.       Physical Exam  Temp:  [36.4 °C (97.5 °F)-36.6 °C (97.8 °F)] 36.5 °C (97.7 °F)  Pulse:  [] 93  Resp:  [18-24] 18  BP: ()/(44-63) 122/55  SpO2:  [89 %-100 %] 96 %    Physical Exam  Vitals and nursing note reviewed.   Constitutional:        General: She is not in acute distress.  HENT:      Head: Normocephalic and atraumatic.      Nose: Nose normal. No rhinorrhea.      Mouth/Throat:      Pharynx: No oropharyngeal exudate or posterior oropharyngeal erythema.   Eyes:      General: No scleral icterus.        Right eye: No discharge.         Left eye: No discharge.   Cardiovascular:      Rate and Rhythm: Normal rate and regular rhythm.      Heart sounds: Normal heart sounds. No murmur heard.    No friction rub. No gallop.   Pulmonary:      Effort: Pulmonary effort is normal. No respiratory distress.      Breath sounds: No stridor. Decreased breath sounds present. No wheezing or rhonchi.   Chest:      Chest wall: No tenderness.   Abdominal:      General: Bowel sounds are normal. There is no distension.      Palpations: Abdomen is soft. There is no mass.      Tenderness: There is no abdominal tenderness. There is no rebound.   Musculoskeletal:         General: No swelling or tenderness.      Cervical back: Neck supple. No rigidity.   Skin:     General: Skin is warm and dry.      Coloration: Skin is not cyanotic or jaundiced.      Nails: There is no clubbing.   Neurological:      General: No focal deficit present.      Mental Status: She is alert and oriented to person, place, and time.      Cranial Nerves: Cranial nerve deficit present.      Motor: Weakness present.      Comments: Left upper extremity 3 -/5  Left lower extremity 4/5   Psychiatric:         Mood and Affect: Mood normal.         Behavior: Behavior normal.         Fluids    Intake/Output Summary (Last 24 hours) at 3/19/2022 1404  Last data filed at 3/19/2022 0800  Gross per 24 hour   Intake 650 ml   Output 700 ml   Net -50 ml       Laboratory  Recent Labs     03/17/22  0535 03/19/22  0600   WBC 14.7* 16.4*   RBC 2.79* 2.88*   HEMOGLOBIN 9.2* 9.4*   HEMATOCRIT 28.5* 29.5*   .2* 102.4*   MCH 33.0 32.6   MCHC 32.3* 31.9*   RDW 49.8 50.4*   PLATELETCT 202 211   MPV 10.3 10.6     Recent Labs      03/17/22  0535 03/19/22  0600   SODIUM 137 136   POTASSIUM 4.5 4.3   CHLORIDE 102 102   CO2 23 23   GLUCOSE 110* 110*   BUN 22 29*   CREATININE 0.78 0.82   CALCIUM 8.3* 8.2*                   Imaging  CT-CHEST,ABDOMEN,PELVIS WITH   Final Result      1.  Hypodense pancreatic tail mass likely represents primary malignancy.      2.  Multiple hepatic masses are consistent with metastasis.      3.  Splenic hypodense masses likely represent metastasis.      4.  Diverticulosis.      5.  Atherosclerosis         EC-ECHOCARDIOGRAM COMPLETE W/O CONT   Final Result      MR-BRAIN-W/O   Final Result         Acute infarct in the right insula/basal ganglia/right caudate region.      Punctate foci of acute infarction involving the right frontoparietal cortex and subcortical region.      Multiple foci of acute infarction involving the bilateral inferior cerebellum.      Focal area of parenchymal hemorrhage in the right insular region with minimal surrounding edema and slight mass effect upon the right lateral ventricle.      Minimal subarachnoid hemorrhage in the sulci in the right temporoparietal region.      DX-CHEST-PORTABLE (1 VIEW)   Final Result      1.  No acute cardiac or pulmonary abnormalities are identified.      IR-THROMBO MECHANICAL ARTERY,INIT   Final Result         71-year-old patient who presented with acute onset of left-sided weakness and facial droop with an NIH stroke scale of 15 underwent emergent mechanical thrombectomy for an ICA terminus occlusion on the right side. The final angiographic images    demonstrate complete recanalization of the ICA, MCA on the right side with minimal distal embolization to a cortical M4 branch each in the superior and in the inferior division.      Final recanalization score: TICI 2C      I, Abdirahman Zhu was physically present and participated during the entire procedure of the IR-THROMBO MECHANICAL ARTERY,INIT.                  CT-CEREBRAL PERFUSION ANALYSIS   Final  Result      1.  Cerebral blood flow less than 30% likely representing completed infarct = 0 mL.      2.  T Max more than 6 seconds likely representing combination of completed infarct and ischemia = 121 mL.      3.  Mismatched volume likely representing ischemic brain/penumbra = infinite      4.  Please note that the cerebral perfusion was performed on the limited brain tissue around the basal ganglia region. Infarct/ischemia outside the CT perfusion sections can be missed in this study.      CT-CTA NECK WITH & W/O-POST PROCESSING   Final Result      1.  Mild bilateral atherosclerosis with less than 50% ICA stenosis.   2.  There is a nonspecific 4 mm posterior right upper lobe lung nodule.      Fleischner Society pulmonary nodule recommendations:   Low Risk: No routine follow-up      High Risk: Optional CT at 12 months      Comments: Nodules less than 6 mm do not require routine follow-up, but certain patients at high risk with suspicious nodule morphology, upper lobe location, or both may warrant 12-month follow-up.      Low Risk - Minimal or absent history of smoking and of other known risk factors.      High Risk - History of smoking or of other known risk factors.      Note: These recommendations do not apply to lung cancer screening, patients with immunosuppression, or patients with known primary cancer.      Fleischner Society 2017 Guidelines for Management of Incidentally Detected Pulmonary Nodules in Adults         CT-CTA HEAD WITH & W/O-POST PROCESS   Final Result      1.  There is a right M1 occlusion. Findings were discussed with Narcisa Chapman by Dr. Zhu the neurointerventional radiologist who has already seen the exam on 3/14/2022 at 3:35 PM.      CT-HEAD W/O   Final Result      1.  There are periventricular and subcortical white matter changes present.  This finding is nonspecific and could be from indeterminate age small vessel ischemia, demyelination, or gliosis. MRI would be more sensitive for  further evaluation.   2.  Mild paranasal sinus disease.                 Assessment/Plan  * Stroke (cerebrum) (HCC)  Assessment & Plan  Status post right M1 thrombectomy  With mild to moderate ICH on MRI  Blood pressure control goal -130  PT/OT/SLP  Neurology recommending to hold antiplatelet/antithrombotics through 3/21/2022    Continue atorvastatin  Evaluated by physiatry declined by rehab discussed with patient  and daughter  SNF referral pending  Neurology recommended Zio patch on discharge    Leukocytosis  Assessment & Plan  No clear source of infection clinically  We will check urinalysis  Patient is afebrile  Recheck CBC in a.m.    Obesity  Assessment & Plan  Body mass index is 31.95 kg/m².     DVT (deep venous thrombosis) (HCC)  Assessment & Plan  Recent DVT was maintained on Eliquis  Given ICH neurology recommending holding antiplatelet/anticoagulant and consider resuming if repeat head CT is negative on 3/21    Liver mass  Assessment & Plan  CT findings concerning for metastatic pancreatic cancer      Discussed need for further work-up with biopsy and oncology referral once recovered from this acute hospitalization   referral for intake oncology coordinator ordered    Discussed again CT findings and plan of care with outpatient further work-up with patient and  and daughter at the bedside    Anemia- (present on admission)  Assessment & Plan  Hemoglobin 9.4 stable    Paroxysmal SVT (supraventricular tachycardia) (HCC)  Assessment & Plan  Stable on metoprolol    GERD (gastroesophageal reflux disease)- (present on admission)  Assessment & Plan  Stable on Prilosec       VTE prophylaxis: SCDs/TEDs    I have performed a physical exam and reviewed and updated ROS and Plan today (3/19/2022). In review of yesterday's note (3/18/2022), there are no changes except as documented above.

## 2022-03-19 NOTE — DISCHARGE PLANNING
Received Choice form at 2614  Agency/Facility Name: [facility]   Referral sent per Choice form @ [id number]

## 2022-03-19 NOTE — CARE PLAN
The patient is Stable - Low risk of patient condition declining or worsening    Shift Goals  Clinical Goals: increased mobility and participation  Patient Goals: comfort  Family Goals: KIMBERLY    Progress made toward(s) clinical / shift goals:    Problem: Neuro Status  Goal: Neuro status will remain stable or improve  Outcome: Progressing     Problem: Hemodynamic Monitoring  Goal: Patient's hemodynamics, fluid balance and neurologic status will be stable or improve  Outcome: Progressing     Problem: Dysphagia  Goal: Dysphagia will improve  Outcome: Progressing     Problem: Risk for Aspiration  Goal: Patient's risk for aspiration will be absent or decrease  Outcome: Progressing     Problem: Mobility - Stroke  Goal: Patient's capacity to carry out activities will improve  Outcome: Progressing, Up to chair for meals.     Problem: Self Care  Goal: Patient will have the ability to perform ADLs independently or with assistance (bathe, groom, dress, toilet and feed)  Outcome: Progressing, working on PT/OT frequently

## 2022-03-19 NOTE — PROGRESS NOTES
4 Eyes Skin Assessment Completed by SUZANNE Castellon and SUZANNE Zepeda.    Head WDL  Ears Redness and Blanching  Nose Redness and Blanching  Mouth WDL  Neck Redness, Blanching and Bruising  Breast/Chest Redness, Bruising and Blanching  Shoulder Blades WDL  Spine Redness, Blanching and Bruising  (R) Arm/Elbow/Hand Redness, Blanching and Bruising  (L) Arm/Elbow/Hand Redness, Blanching and Bruising  Abdomen Redness, Blanching and Bruising  Groin Redness, Blanching, Bruising and Incision Right thrombectomy   Scrotum/Coccyx/Buttocks Redness and Blanching  (R) Leg Redness, Blanching and Bruising  (L) Leg Redness, Blanching and Bruising  (R) Heel/Foot/Toe Redness and Blanching  (L) Heel/Foot/Toe Redness and Blanching          Devices In Places Tele Box, Pulse Ox and SCD's      Interventions In Place Gray Ear Foams, Waffle Overlay, TAP System, Pillows, Q2 Turns, Barrier Cream, Heels Loaded W/Pillows and Pressure Redistribution Mattress    Possible Skin Injury Yes    Pictures Uploaded Into Epic N/A  Wound Consult Placed N/A  RN Wound Prevention Protocol Ordered Yes

## 2022-03-19 NOTE — CARE PLAN
The patient is Stable - Low risk of patient condition declining or worsening    Shift Goals  Clinical Goals: mobility  Patient Goals: comfort  Family Goals: KIMBERLY    Progress made toward(s) clinical / shift goals: Pt up to chair during shift. Pt encouraged to help turn during q2 turns. Helped pt perform ROM while in bed. Waffle mattress, SCD's, and grey foams applied for pt comfort.    Patient is not progressing towards the following goals: N/A

## 2022-03-19 NOTE — DISCHARGE PLANNING
Anticipated Discharge Disposition: SNF    Action: Spoke with the pt and her dtr and  at bedside. Went over the SNF choice form. They provided only one choice for SNF - [facility]. Faxed it to [facility]. Also, left a SNF list with the family and asked them to provide 1-2 more choices in case [facility] is not able to accept the pt.     CM on Sunday to f/u with family re alternative choices for SNF.     Barriers to Discharge: pending medical stability, SNF acceptance    Plan: cont to f/u for DC needs.

## 2022-03-20 PROBLEM — N39.0 UTI (URINARY TRACT INFECTION): Status: ACTIVE | Noted: 2022-01-01

## 2022-03-20 NOTE — CARE PLAN
The patient is Stable - Low risk of patient condition declining or worsening    Shift Goals  Clinical Goals: increase  mobility, rest  Patient Goals: sleep  Family Goals: KIMBERLY    Progress made toward(s) clinical / shift goals:    Problem: Neuro Status  Goal: Neuro status will remain stable or improve  Outcome: Progressing     Problem: Fall Risk  Goal: Patient will remain free from falls  Outcome: Progressing     Problem: Skin Integrity  Goal: Skin integrity is maintained or improved  Outcome: Progressing   Turns q 2 hrs   Problem: Pain - Standard  Goal: Alleviation of pain or a reduction in pain to the patient’s comfort goal  Outcome: Progressing   PRN medication administered patient able to sleep without a headache.    Patient is not progressing towards the following goals:

## 2022-03-20 NOTE — HOSPITAL COURSE
A 71-year-old woman with h/o DVT (AC with Eliquis), PSVT, GERD presented with left-sided weakness and facial droop. Work-up revealed a right M1 occlusion. She was not a candidate for lytic therapy due to anticoagulation and she underwent thrombectomy. Was admitted to the intensive care unit. Follow-up MRI revealed acute ischemic infarct in the right temporal region right MCA territory with mild to moderate hemorrhagic conversion.

## 2022-03-20 NOTE — PROGRESS NOTES
[facility] Medicine Daily Progress Note    Date of Service  3/20/2022    Chief Complaint  Ceci Mata is a 71 y.o. female admitted 3/14/2022 with left-sided weakness    Hospital Course  A 71-year-old woman with h/o DVT (AC with Eliquis), PSVT, GERD presented with left-sided weakness and facial droop. Work-up revealed a right M1 occlusion. She was not a candidate for lytic therapy due to anticoagulation and she underwent thrombectomy. Was admitted to the intensive care unit. Follow-up MRI revealed acute ischemic infarct in the right temporal region right MCA territory with mild to moderate hemorrhagic conversion.      Interval Problem Update  3/20: Patient reports left-sided weakness, recurrent UTIs.  Afebrile, hemodynamically stable. On 1 L NC oxygen.  WBC 12.3 improving.  Hb 8.0 down from 9.4. Stool for occult blood.  UA positive for pyuria. Urine culture. Started ceftriaxone  PT, OT recommended post-acute placement.  Pending SNF placement.    I have personally seen and examined the patient at bedside. I discussed the plan of care with patient, bedside RN and charge RN.    Consultants/Specialty  critical care, neurology, physiatry and hospital medicine    Code Status  Full Code    Disposition  Patient is medically cleared pending placement for discharge.   Anticipate discharge to to skilled nursing facility.  I have placed the appropriate orders for post-discharge needs.    Review of Systems  Review of Systems   Constitutional: Positive for malaise/fatigue. Negative for chills and fever.   HENT: Negative.    Eyes: Negative.    Respiratory: Negative for cough and shortness of breath.    Cardiovascular: Negative for chest pain and leg swelling.   Gastrointestinal: Negative for abdominal pain, nausea and vomiting.   Genitourinary: Negative for dysuria.   Musculoskeletal: Negative for myalgias.   Skin: Negative for rash.   Neurological: Positive for focal weakness (left-sided).        Physical Exam  Temp:  [36 °C  (96.8 °F)-36.5 °C (97.7 °F)] 36.1 °C (96.9 °F)  Pulse:  [] 83  Resp:  [18-20] 18  BP: (111-146)/(52-62) 137/61  SpO2:  [95 %-97 %] 95 %    Physical Exam  Vitals and nursing note reviewed.   Constitutional:       Appearance: She is obese. She is ill-appearing.   HENT:      Head: Normocephalic and atraumatic.      Nose: Nose normal.      Mouth/Throat:      Mouth: Mucous membranes are moist.      Pharynx: Oropharynx is clear.   Eyes:      Conjunctiva/sclera: Conjunctivae normal.      Pupils: Pupils are equal, round, and reactive to light.   Cardiovascular:      Rate and Rhythm: Normal rate and regular rhythm.   Pulmonary:      Effort: Pulmonary effort is normal. No respiratory distress.      Breath sounds: No wheezing or rales.   Abdominal:      General: Abdomen is flat. Bowel sounds are normal. There is no distension.      Tenderness: There is no abdominal tenderness.   Musculoskeletal:         General: Normal range of motion.      Cervical back: Normal range of motion.   Skin:     General: Skin is warm.   Neurological:      Mental Status: She is alert and oriented to person, place, and time.      Motor: Weakness (LUE 3/5. LLE 4/5) present.         Fluids    Intake/Output Summary (Last 24 hours) at 3/20/2022 0725  Last data filed at 3/20/2022 0400  Gross per 24 hour   Intake --   Output 150 ml   Net -150 ml       Laboratory  Recent Labs     03/19/22  0600 03/20/22  0028   WBC 16.4* 12.3*   RBC 2.88* 2.44*   HEMOGLOBIN 9.4* 8.0*   HEMATOCRIT 29.5* 25.3*   .4* 103.7*   MCH 32.6 32.8   MCHC 31.9* 31.6*   RDW 50.4* 51.0*   PLATELETCT 211 198   MPV 10.6 11.2     Recent Labs     03/19/22  0600 03/20/22  0028   SODIUM 136 137   POTASSIUM 4.3 4.4   CHLORIDE 102 101   CO2 23 26   GLUCOSE 110* 106*   BUN 29* 34*   CREATININE 0.82 0.94   CALCIUM 8.2* 8.3*                   Imaging  CT-CHEST,ABDOMEN,PELVIS WITH   Final Result      1.  Hypodense pancreatic tail mass likely represents primary malignancy.      2.   Multiple hepatic masses are consistent with metastasis.      3.  Splenic hypodense masses likely represent metastasis.      4.  Diverticulosis.      5.  Atherosclerosis         EC-ECHOCARDIOGRAM COMPLETE W/O CONT   Final Result      MR-BRAIN-W/O   Final Result         Acute infarct in the right insula/basal ganglia/right caudate region.      Punctate foci of acute infarction involving the right frontoparietal cortex and subcortical region.      Multiple foci of acute infarction involving the bilateral inferior cerebellum.      Focal area of parenchymal hemorrhage in the right insular region with minimal surrounding edema and slight mass effect upon the right lateral ventricle.      Minimal subarachnoid hemorrhage in the sulci in the right temporoparietal region.      DX-CHEST-PORTABLE (1 VIEW)   Final Result      1.  No acute cardiac or pulmonary abnormalities are identified.      IR-THROMBO MECHANICAL ARTERY,INIT   Final Result         71-year-old patient who presented with acute onset of left-sided weakness and facial droop with an NIH stroke scale of 15 underwent emergent mechanical thrombectomy for an ICA terminus occlusion on the right side. The final angiographic images    demonstrate complete recanalization of the ICA, MCA on the right side with minimal distal embolization to a cortical M4 branch each in the superior and in the inferior division.      Final recanalization score: TICI 2C      I, Abdirahman Lejosesam was physically present and participated during the entire procedure of the IR-THROMBO MECHANICAL ARTERY,INIT.                  CT-CEREBRAL PERFUSION ANALYSIS   Final Result      1.  Cerebral blood flow less than 30% likely representing completed infarct = 0 mL.      2.  T Max more than 6 seconds likely representing combination of completed infarct and ischemia = 121 mL.      3.  Mismatched volume likely representing ischemic brain/penumbra = infinite      4.  Please note that the cerebral perfusion  was performed on the limited brain tissue around the basal ganglia region. Infarct/ischemia outside the CT perfusion sections can be missed in this study.      CT-CTA NECK WITH & W/O-POST PROCESSING   Final Result      1.  Mild bilateral atherosclerosis with less than 50% ICA stenosis.   2.  There is a nonspecific 4 mm posterior right upper lobe lung nodule.      Fleischner Society pulmonary nodule recommendations:   Low Risk: No routine follow-up      High Risk: Optional CT at 12 months      Comments: Nodules less than 6 mm do not require routine follow-up, but certain patients at high risk with suspicious nodule morphology, upper lobe location, or both may warrant 12-month follow-up.      Low Risk - Minimal or absent history of smoking and of other known risk factors.      High Risk - History of smoking or of other known risk factors.      Note: These recommendations do not apply to lung cancer screening, patients with immunosuppression, or patients with known primary cancer.      Fleischner Society 2017 Guidelines for Management of Incidentally Detected Pulmonary Nodules in Adults         CT-CTA HEAD WITH & W/O-POST PROCESS   Final Result      1.  There is a right M1 occlusion. Findings were discussed with Narcisa Chapman by Dr. Zhu the neurointerventional radiologist who has already seen the exam on 3/14/2022 at 3:35 PM.      CT-HEAD W/O   Final Result      1.  There are periventricular and subcortical white matter changes present.  This finding is nonspecific and could be from indeterminate age small vessel ischemia, demyelination, or gliosis. MRI would be more sensitive for further evaluation.   2.  Mild paranasal sinus disease.                 Assessment/Plan  * Stroke (cerebrum) (HCC)  Assessment & Plan  Status post right M1 thrombectomy  With mild to moderate ICH on MRI  Blood pressure control goal -130  PT/OT/SLP  Neurology recommending to hold antiplatelet/antithrombotics through  3/21/2022  Continue atorvastatin  Evaluated by physiatry declined by rehab discussed with patient  and daughter  SNF pending  Neurology recommended Zio patch on discharge    UTI (urinary tract infection)  Assessment & Plan  UA positive of pyuria  Has leukocytosis  Urine culture  Started ceftriaxone    Leukocytosis  Assessment & Plan  UA positive for pyuria  Started ceftriaxone  Monitor    Obesity  Assessment & Plan  Body mass index is 31.95 kg/m².     DVT (deep venous thrombosis) (HCC)  Assessment & Plan  Recent DVT was maintained on Eliquis  Given ICH neurology recommending holding antiplatelet/anticoagulant and consider resuming if repeat head CT is negative on 3/21    Liver mass  Assessment & Plan  CT findings concerning for metastatic pancreatic cancer  Discussed need for further work-up with biopsy and oncology referral once recovered from this acute hospitalization  Referral for intake oncology coordinator ordered  Discussed again CT findings and plan of care with outpatient further work-up with patient and  and daughter at the bedside    Anemia- (present on admission)  Assessment & Plan  Hemoglobin 9.4 stable    Paroxysmal SVT (supraventricular tachycardia) (HCC)  Assessment & Plan  Stable on metoprolol    GERD (gastroesophageal reflux disease)- (present on admission)  Assessment & Plan  Stable on Prilosec       VTE prophylaxis: SCDs/TEDs    I have performed a physical exam and reviewed and updated ROS and Plan today (3/20/2022). In review of yesterday's note (3/19/2022), there are no changes except as documented above.

## 2022-03-20 NOTE — CARE PLAN
The patient is Stable - Low risk of patient condition declining or worsening    Shift Goals  Clinical Goals: mobilize  Patient Goals: Sit up in the chair  Family Goals: Work with PT    Progress made toward(s) clinical / shift goals:  Pt up to chair with staff assist. Also assisted up to bathroom via sit to stand with x2 assist. Encouraging pt to complete SLP exercises and ROM exercises.    Patient is not progressing towards the following goals:      Problem: Urinary Elimination  Goal: Establish and maintain regular urinary output  Outcome: Not Progressing  Note: Pt incontinent of urine - purewick in place.

## 2022-03-21 NOTE — PROGRESS NOTES
Monitor Summary: SR 86-96, IN .13, QRS .09, QT .33 with occasional PVC per strip from monitor room

## 2022-03-21 NOTE — PROGRESS NOTES
[facility] Medicine Daily Progress Note    Date of Service  3/21/2022    Chief Complaint  eCci Mata is a 71 y.o. female admitted 3/14/2022 with left-sided weakness.    Hospital Course  A 71-year-old woman with h/o DVT (AC with Eliquis), PSVT, GERD presented with left-sided weakness and facial droop. Work-up revealed a right M1 occlusion. She was not a candidate for lytic therapy due to anticoagulation and she underwent thrombectomy. Was admitted to the intensive care unit. Follow-up MRI revealed acute ischemic infarct in the right temporal region right MCA territory with mild to moderate hemorrhagic conversion.      Interval Problem Update  3/20: Patient reports left-sided weakness, recurrent UTIs.  Afebrile, hemodynamically stable. On 1 L NC oxygen.  WBC 12.3 improving.  Hb 8.0 down from 9.4. Stool for occult blood.  UA positive for pyuria. Urine culture. Started ceftriaxone  PT, OT recommended post-acute placement.  Pending SNF placement.    3/21: Afebrile, hemodynamically stable. On room air.  WBC 13.2  Na 134.  Stool occult blood negative.  Repeat CT head today  Resume AC?  Urine culture form 3/19 positive for Proteus, sensitivity in process, continue IV ceftriaxone.    I have personally seen and examined the patient at bedside. I discussed the plan of care with patient, bedside RN, charge RN,  and pharmacy.    Consultants/Specialty  critical care, neurology, physiatry and hospital medicine    Code Status  Full Code    Disposition  Patient is medically cleared pending placement for discharge.   Anticipate discharge to to skilled nursing facility.  I have placed the appropriate orders for post-discharge needs.    Review of Systems  Constitutional: Positive for malaise/fatigue. Negative for chills and fever.   HENT: Negative.    Eyes: Negative.    Respiratory: Negative for cough and shortness of breath.    Cardiovascular: Negative for chest pain and leg swelling.   Gastrointestinal: Negative for  abdominal pain, nausea and vomiting.   Genitourinary: Negative for dysuria.   Musculoskeletal: Negative for myalgias.   Skin: Negative for rash.   Neurological: Positive for focal weakness (left-sided).     Physical Exam  Temp:  [36.1 °C (97 °F)-36.7 °C (98 °F)] 36.1 °C (97 °F)  Pulse:  [86-99] 99  Resp:  [17-18] 18  BP: ()/(43-86) 135/86  SpO2:  [95 %-98 %] 95 %    Physical Exam  Vitals and nursing note reviewed.   Constitutional:       Appearance: She is obese. She is ill-appearing.   HENT:      Head: Normocephalic and atraumatic.      Nose: Nose normal.      Mouth/Throat:      Mouth: Mucous membranes are moist.      Pharynx: Oropharynx is clear.   Eyes:      Conjunctiva/sclera: Conjunctivae normal.      Pupils: Pupils are equal, round, and reactive to light.   Cardiovascular:      Rate and Rhythm: Normal rate and regular rhythm.   Pulmonary:      Effort: Pulmonary effort is normal. No respiratory distress.      Breath sounds: No wheezing or rales.   Abdominal:      General: Abdomen is flat. Bowel sounds are normal. There is no distension.      Tenderness: There is no abdominal tenderness.   Musculoskeletal:         General: Normal range of motion.      Cervical back: Normal range of motion.   Skin:     General: Skin is warm.   Neurological:      Mental Status: She is alert and oriented to person, place, and time.      Motor: Weakness (LUE 3/5. LLE 4/5) present.     Fluids    Intake/Output Summary (Last 24 hours) at 3/21/2022 1224  Last data filed at 3/21/2022 0800  Gross per 24 hour   Intake --   Output 200 ml   Net -200 ml       Laboratory  Recent Labs     03/19/22  0600 03/20/22  0028 03/21/22  0151   WBC 16.4* 12.3* 13.2*   RBC 2.88* 2.44* 2.43*   HEMOGLOBIN 9.4* 8.0* 8.0*   HEMATOCRIT 29.5* 25.3* 25.5*   .4* 103.7* 104.9*   MCH 32.6 32.8 32.9   MCHC 31.9* 31.6* 31.4*   RDW 50.4* 51.0* 50.1*   PLATELETCT 211 198 204   MPV 10.6 11.2 10.8     Recent Labs     03/19/22  0600 03/20/22  0028  03/21/22  0151   SODIUM 136 137 134*   POTASSIUM 4.3 4.4 4.4   CHLORIDE 102 101 100   CO2 23 26 25   GLUCOSE 110* 106* 105*   BUN 29* 34* 33*   CREATININE 0.82 0.94 0.81   CALCIUM 8.2* 8.3* 7.9*                   Imaging  CT-CHEST,ABDOMEN,PELVIS WITH   Final Result      1.  Hypodense pancreatic tail mass likely represents primary malignancy.      2.  Multiple hepatic masses are consistent with metastasis.      3.  Splenic hypodense masses likely represent metastasis.      4.  Diverticulosis.      5.  Atherosclerosis         EC-ECHOCARDIOGRAM COMPLETE W/O CONT   Final Result      MR-BRAIN-W/O   Final Result         Acute infarct in the right insula/basal ganglia/right caudate region.      Punctate foci of acute infarction involving the right frontoparietal cortex and subcortical region.      Multiple foci of acute infarction involving the bilateral inferior cerebellum.      Focal area of parenchymal hemorrhage in the right insular region with minimal surrounding edema and slight mass effect upon the right lateral ventricle.      Minimal subarachnoid hemorrhage in the sulci in the right temporoparietal region.      DX-CHEST-PORTABLE (1 VIEW)   Final Result      1.  No acute cardiac or pulmonary abnormalities are identified.      IR-THROMBO MECHANICAL ARTERY,INIT   Final Result         71-year-old patient who presented with acute onset of left-sided weakness and facial droop with an NIH stroke scale of 15 underwent emergent mechanical thrombectomy for an ICA terminus occlusion on the right side. The final angiographic images    demonstrate complete recanalization of the ICA, MCA on the right side with minimal distal embolization to a cortical M4 branch each in the superior and in the inferior division.      Final recanalization score: TICI 2C      I, Abdirahman Zhu was physically present and participated during the entire procedure of the IR-THROMBO MECHANICAL ARTERY,INIT.                  CT-CEREBRAL PERFUSION  ANALYSIS   Final Result      1.  Cerebral blood flow less than 30% likely representing completed infarct = 0 mL.      2.  T Max more than 6 seconds likely representing combination of completed infarct and ischemia = 121 mL.      3.  Mismatched volume likely representing ischemic brain/penumbra = infinite      4.  Please note that the cerebral perfusion was performed on the limited brain tissue around the basal ganglia region. Infarct/ischemia outside the CT perfusion sections can be missed in this study.      CT-CTA NECK WITH & W/O-POST PROCESSING   Final Result      1.  Mild bilateral atherosclerosis with less than 50% ICA stenosis.   2.  There is a nonspecific 4 mm posterior right upper lobe lung nodule.      Fleischner Society pulmonary nodule recommendations:   Low Risk: No routine follow-up      High Risk: Optional CT at 12 months      Comments: Nodules less than 6 mm do not require routine follow-up, but certain patients at high risk with suspicious nodule morphology, upper lobe location, or both may warrant 12-month follow-up.      Low Risk - Minimal or absent history of smoking and of other known risk factors.      High Risk - History of smoking or of other known risk factors.      Note: These recommendations do not apply to lung cancer screening, patients with immunosuppression, or patients with known primary cancer.      Fleischner Society 2017 Guidelines for Management of Incidentally Detected Pulmonary Nodules in Adults         CT-CTA HEAD WITH & W/O-POST PROCESS   Final Result      1.  There is a right M1 occlusion. Findings were discussed with Narcisa Chapman by Dr. Zhu the neurointerventional radiologist who has already seen the exam on 3/14/2022 at 3:35 PM.      CT-HEAD W/O   Final Result      1.  There are periventricular and subcortical white matter changes present.  This finding is nonspecific and could be from indeterminate age small vessel ischemia, demyelination, or gliosis. MRI would be more  sensitive for further evaluation.   2.  Mild paranasal sinus disease.            CT-HEAD W/O    (Results Pending)        Assessment/Plan  * Stroke (cerebrum) (HCC)  Assessment & Plan  Status post right M1 thrombectomy  With mild to moderate ICH on MRI  Blood pressure control goal -130  PT/OT/SLP  Neurology recommending to hold antiplatelet/antithrombotics through 3/21/2022  Continue atorvastatin  Evaluated by physiatry declined by rehab discussed with patient  and daughter  SNF pending  Neurology recommended Zio patch on discharge    UTI (urinary tract infection)  Assessment & Plan  UA positive of pyuria  Has leukocytosis  Urine culture  Started ceftriaxone    Leukocytosis  Assessment & Plan  UA positive for pyuria  Started ceftriaxone  Monitor    Obesity  Assessment & Plan  Body mass index is 33.25 kg/m².   Outpatient weight loss counseling     DVT (deep venous thrombosis) (HCC)  Assessment & Plan  Recent DVT was maintained on Eliquis  Given ICH neurology recommending holding antiplatelet/anticoagulant and consider resuming if repeat head CT is negative on 3/21    Liver mass  Assessment & Plan  CT findings concerning for metastatic pancreatic cancer  Discussed need for further work-up with biopsy and oncology referral once recovered from this acute hospitalization  Referral for intake oncology coordinator ordered  Discussed again CT findings and plan of care with outpatient further work-up with patient and  and daughter at the bedside    Anemia- (present on admission)  Assessment & Plan  Hemoglobin 9.4 stable    Paroxysmal SVT (supraventricular tachycardia) (HCC)  Assessment & Plan  Stable on metoprolol    GERD (gastroesophageal reflux disease)- (present on admission)  Assessment & Plan  Stable on Prilosec       VTE prophylaxis: SCDs/TEDs    I have performed a physical exam and reviewed and updated ROS and Plan today (3/21/2022). In review of yesterday's note (3/20/2022), there are no changes  except as documented above.

## 2022-03-21 NOTE — DISCHARGE PLANNING
Agency[facility]Facility Name: [facility]   Spoke To: Amita   Outcome: Per Amita pt is accepted and bed is open today if pt is medically cleared. DPA to follow up with Amita.    Agency[facility]Facility Name: [facility]   Outcome: DPA left a voicemail for Amita that pt has a CT scan set for 1230 today. Once resulted if pt can go to facility after possibly around [phone]. DPA requested a call back.

## 2022-03-21 NOTE — PROGRESS NOTES
Monitor Summary: SR/ST , ID 0.12, QRS 0.10, QT 0.33 with rare and occasional PVS and rare couplets per strip from monitor room.

## 2022-03-21 NOTE — DISCHARGE INSTRUCTIONS
Discharge InstructionsDischarge Instructions    Discharged to other by medical transportation with escort. Discharged via ambulance, hospital escort: Yes.  Special equipment needed: Not Applicable    Be sure to schedule a follow-up appointment with your primary care doctor or any specialists as instructed.     Discharge Plan:   Diet Plan: Discussed  Activity Level: Discussed  Confirmed Follow up Appointment: No (Comments) (Discharge to SNF)  Confirmed Symptoms Management: Discussed  Medication Reconciliation Updated: Yes  Influenza Vaccine Indication: Patient Refuses    I understand that a diet low in cholesterol, fat, and sodium is recommended for good health. Unless I have been given specific instructions below for another diet, I accept this instruction as my diet prescription.   Other diet: Level 4 pureed with thin liquids and medications crushed in puree.    Special Instructions:     Stroke/CVA/TIA/Hemorrhagic Ischemia Discharge Instructions  You have had a stroke. Your risk factors have been identified as follows:  Age - Over 55  High Cholesterol and lipids  Overweight  It is important that you reduce your risk factors to avoid another stroke in the future. Here are some general guidelines to follow:  · Eat healthy - avoid food high in fat.  · Get regular exercise.  · Maintain a healthy weight.  · Avoid smoking.  · Avoid alcohol and illegal drug use.  · Take your medications as directed.  For more information regarding risk factors, refer to pages 17-19 in your Stroke Patient Education Guide. Stroke Education Guide was given to patient.    Warning signs of a stroke include (which can also be found on page 3 of your Stroke Patient Education Guide):  · Sudden numbness of weakness of the face, arm or leg (especially on one side of the body).  · Sudden confusion, trouble speaking or understanding.  · Sudden trouble seeing in one or both eyes.  · Sudden trouble walking, dizziness, loss of balance or  coordination.  · Sudden severe headache with no known cause.  It is very important to get treatment quickly when a stroke occurs. If you experience any of the above warning signs, call [phone] immediately.     Some patients who have had a stroke will be going home on a blood thinner medication called Warfarin (Coumadin).  This medication requires very close monitoring and follow up.  This follow up can be provided by either your Primary Care Physician or by [facility] Outpatient Anticoagulation Service.  The Outpatient Anticoagulation Service is located at the [facility] at [facility] ([facility]  If you do not know when your follow up appointment is scheduled, call [phone] to verify your appointment time.      · Is patient discharged on Warfarin / Coumadin?   No     Depression / Suicide Risk    As you are discharged from this [facility], it is important to learn how to keep safe from harming yourself.    Recognize the warning signs:  · Abrupt changes in personality, positive or negative- including increase in energy   · Giving away possessions  · Change in eating patterns- significant weight changes-  positive or negative  · Change in sleeping patterns- unable to sleep or sleeping all the time   · Unwillingness or inability to communicate  · Depression  · Unusual sadness, discouragement and loneliness  · Talk of wanting to die  · Neglect of personal appearance   · Rebelliousness- reckless behavior  · Withdrawal from people/activities they love  · Confusion- inability to concentrate     If you or a loved one observes any of these behaviors or has concerns about self-harm, here's what you can do:  · Talk about it- your feelings and reasons for harming yourself  · Remove any means that you might use to hurt yourself (examples: pills, rope, extension cords, firearm)  · Get professional help from the community (Mental Health, Substance Abuse,  psychological counseling)  · Do not be alone:Call your Safe Contact- someone whom you trust who will be there for you.  · Call your local CRISIS HOTLINE 274-8920 or 416-182-4397  · Call your local Children's Mobile Crisis Response Team [place] (020) 271-4471 or www.PromoteSocial  · Call the toll free National Suicide Prevention Hotlines   · National Suicide Prevention Lifeline 754-887-UTVX (2384)  · National Hope Line Network 800-SUICIDE (348-6622)

## 2022-03-21 NOTE — CARE PLAN
The patient is Stable - Low risk of patient condition declining or worsening    Shift Goals  Clinical Goals: increase mobility, rest  Patient Goals: rest  Family Goals: KIMBERLY    Progress made toward(s) clinical / shift goals:    Problem: Neuro Status  Goal: Neuro status will remain stable or improve  Outcome: Progressing     Problem: Hemodynamic Monitoring  Goal: Patient's hemodynamics, fluid balance and neurologic status will be stable or improve  Outcome: Progressing     Problem: Pain - Standard  Goal: Alleviation of pain or a reduction in pain to the patient’s comfort goal  Outcome: Progressing     Problem: Skin Integrity  Goal: Skin integrity is maintained or improved  Outcome: Progressing     Problem: Fall Risk  Goal: Patient will remain free from falls  Outcome: Progressing       Patient is not progressing towards the following goals:  Patient very fatigued

## 2022-03-21 NOTE — THERAPY
Occupational Therapy  Daily Treatment     Patient Name: Ceci Mata  Age:  71 y.o., Sex:  female  Medical Record #: [id number]  Today's Date: 3/21/2022       Precautions: Fall Risk,Swallow Precautions ( See Comments)  Comments: L inattention    Assessment    Pt seen for OT tx. Continues to be limited by decreased activity tolerance, balance deficits and LUE weakness impacting ability to complete ADLs and ADL transfers independently. LUE continues to be weaker but improving. Encouraged pt to continue to mobilize w/ nrs[facility] staff as tolerated. Educated about keeping pillow under LUE for support for joint protection. Will continue to benefit from OT services while in house.     Plan    Continue current treatment plan.    DC Equipment Recommendations: Unable to determine at this time  Discharge Recommendations: Recommend post-acute placement for additional occupational therapy services prior to discharge home       03/21/22 1422   Cognition    Cognition / Consciousness X   Speech/ Communication Delayed Responses;Dysarthric   Safety Awareness Impaired   Attention Impaired   Active ROM Upper Body   Active ROM Upper Body  X   Comments proximal ROM limited by weakness LUE   Strength Upper Body   Upper Body Strength  X   Comments LUE grossly limited by weakness proximally > distally   Activities of Daily Living   Grooming Minimal Assist;Seated   Upper Body Dressing Moderate Assist   Lower Body Dressing Maximal Assist   Toileting Maximal Assist   Short Term Goals   Short Term Goal # 1 pt will complete grooming seated w/set up   Goal Outcome # 1 Progressing as expected   Short Term Goal # 2 pt will complete txf to BSC w/min A   Goal Outcome # 2 Progressing as expected   Short Term Goal # 3 pt will complete UB dressing w/spv   Goal Outcome # 3 Progressing slower than expected   Short Term Goal # 4 LB dressing with min A   Goal Outcome # 4 Goal not met   Anticipated Discharge Equipment and Recommendations   DC Equipment  Recommendations Unable to determine at this time   Discharge Recommendations Recommend post-acute placement for additional occupational therapy services prior to discharge home

## 2022-03-21 NOTE — THERAPY
Physical Therapy   Daily Treatment     Patient Name: Ceci Mata  Age:  71 y.o., Sex:  female  Medical Record #: [id number]  Today's Date: 3/21/2022     Precautions  Precautions: Fall Risk;Swallow Precautions ( See Comments)  Comments: L inattention    Assessment    Pt seen for PT treatment session. Agreeable to work with PT, but had a more flat affect today. Required Mod A to complete mobility out the L side of the bed and stand with FWW. Requires hand over hand cues to maintain L  on FWW and weight shifting facilitation to prompt stand pivot transfer from EOB to chair. Pt with more automatic advancement of LEs to weight shift vs verbal cue sequencing. Mild improvement noted globally. PT will continue to follow.     Plan    Continue current treatment plan.    DC Equipment Recommendations: Unable to determine at this time  Discharge Recommendations: Recommend post-acute placement for additional physical therapy services prior to discharge home     03/21/22 1011   Precautions   Precautions Fall Risk;Swallow Precautions ( See Comments)   Comments L inattention   Pain 0 - 10 Group   Therapist Pain Assessment During Activity;Nurse Notified  (no pain reported)   Cognition    Cognition / Consciousness X   Speech/ Communication Delayed Responses;Dysarthric  (dysarthria appears to be slightly improved)   Level of Consciousness Alert   Sequencing Impaired   Comments appeared to have a more flat affect today. L visual inattention   Strength Lower Body   Comments weakness L UE > L LE   Balance   Sitting Balance (Static) Fair   Sitting Balance (Dynamic) Fair   Standing Balance (Static) Poor +   Standing Balance (Dynamic) Poor   Weight Shift Sitting Fair   Weight Shift Standing Poor   Skilled Intervention Verbal Cuing;Sequencing;Compensatory Strategies   Comments with FWW   Gait Analysis   Gait Level Of Assist Unable to Participate   Comments steps for pivot transfer   Bed Mobility    Supine to Sit Moderate Assist  (to  left)   Sit to Supine   (seated in chair at end of session)   Scooting Moderate Assist   Rolling Moderate Assist to Lt.   Skilled Intervention Verbal Cuing;Sequencing;Compensatory Strategies   Functional Mobility   Sit to Stand Moderate Assist   Bed, Chair, Wheelchair Transfer Moderate Assist   Transfer Method Stand Step   Skilled Intervention Tactile Cuing;Facilitation   Comments facilitation for lateral weight shift to prompt and advance L LE   How much difficulty does the patient currently have...   Turning over in bed (including adjusting bedclothes, sheets and blankets)? 1   Sitting down on and standing up from a chair with arms (e.g., wheelchair, bedside commode, etc.) 1   Moving from lying on back to sitting on the side of the bed? 1   How much help from another person does the patient currently need...   Moving to and from a bed to a chair (including a wheelchair)? 2   Need to walk in a hospital room? 2   Climbing 3-5 steps with a railing? 1   6 clicks Mobility Score 8   Patient / Family Goals    Patient / Family Goal #1 return to PLOF   Goal #1 Outcome Goal not met   Short Term Goals    Short Term Goal # 1 Patient will move supine<>sitting EOB without bed features with supervision within 6tx in order to get in/out of bed   Goal Outcome # 1 goal not met   Short Term Goal # 2 Patient will move sitting<>standing with supervision within 6tx in order to initiate transfers and gait   Goal Outcome # 2 Goal not met   Short Term Goal # 3 Patient will ambulate 50ft with supervision within 6tx in order to access environment   Goal Outcome # 3 Goal not met   Short Term Goal # 4 Patient will ascend/descend 4 steps with min A within 6tx in order to enter/exit home   Goal Outcome # 4 Goal not met   Education Group   Role of Physical Therapist Patient Response Patient;Acceptance;Explanation;Verbal Demonstration   Anticipated Discharge Equipment and Recommendations   DC Equipment Recommendations Unable to determine at this  time   Discharge Recommendations Recommend post-acute placement for additional physical therapy services prior to discharge home   Interdisciplinary Plan of Care Collaboration   IDT Collaboration with  Nursing   Patient Position at End of Therapy Seated;Call Light within Reach;Tray Table within Reach;Chair Alarm On;Phone within Reach   Collaboration Comments aware of PT eval

## 2022-03-22 NOTE — PROGRESS NOTES
[facility] Medicine Daily Progress Note    Date of Service  3/22/2022    Chief Complaint  Ceci Mata is a 71 y.o. female admitted 3/14/2022 with left-sided weakness.    Hospital Course  A 71-year-old woman with h/o DVT (AC with Eliquis), PSVT, GERD presented with left-sided weakness and facial droop. Work-up revealed a right M1 occlusion. She was not a candidate for lytic therapy due to anticoagulation and she underwent thrombectomy. Was admitted to the intensive care unit. Follow-up MRI revealed acute ischemic infarct in the right temporal region right MCA territory with mild to moderate hemorrhagic conversion.      Interval Problem Update    Urine culture shows Proteus Mirabella's sensitive to cephalosporins      Repeat CT shows a stable bleed    Patient denies any worsening of any headache    Patient afebrile    As per neuro MD recommendations they recommend starting Eliquis which has been started today    I have personally seen and examined the patient at bedside. I discussed the plan of care with patient, bedside RN, charge RN,  and pharmacy.    Consultants/Specialty  critical care, neurology, physiatry and hospital medicine    Code Status  Full Code    Disposition  Patient is medically cleared pending placement for discharge.   Anticipate discharge to to skilled nursing facility.  I have placed the appropriate orders for post-discharge needs.    Review of Systems  Constitutional: Positive for malaise/fatigue. Negative for chills and fever.   HENT: Negative.    Eyes: Negative.    Respiratory: Negative for cough and shortness of breath.    Cardiovascular: Negative for chest pain and leg swelling.   Gastrointestinal: Negative for abdominal pain, nausea and vomiting.   Genitourinary: Negative for dysuria.   Musculoskeletal: Negative for myalgias.   Skin: Negative for rash.   Neurological: Positive for focal weakness (left-sided).     Physical Exam  Temp:  [36.1 °C (96.9 °F)-37.2 °C (99 °F)] 36.4 °C  (97.6 °F)  Pulse:  [] 96  Resp:  [17-18] 17  BP: (107-150)/(49-66) 107/49  SpO2:  [90 %-94 %] 90 %    Physical Exam  Vitals and nursing note reviewed.   Constitutional:       Appearance: She is obese. She is ill-appearing.   HENT:      Head: Normocephalic and atraumatic.      Nose: Nose normal.      Mouth/Throat:      Mouth: Mucous membranes are moist.      Pharynx: Oropharynx is clear.   Eyes:      Conjunctiva/sclera: Conjunctivae normal.      Pupils: Pupils are equal, round, and reactive to light.   Cardiovascular:      Rate and Rhythm: Normal rate and regular rhythm.   Pulmonary:      Effort: Pulmonary effort is normal. No respiratory distress.      Breath sounds: No wheezing or rales.   Abdominal:      General: Abdomen is flat. Bowel sounds are normal. There is no distension.      Tenderness: There is no abdominal tenderness.   Musculoskeletal:         General: Normal range of motion.      Cervical back: Normal range of motion.   Skin:     General: Skin is warm.   Neurological:      Mental Status: She is alert and oriented to person, place, and time.      Motor: Weakness (LUE 4/5. LLE 4+/5) present.     Fluids    Intake/Output Summary (Last 24 hours) at 3/22/2022 1451  Last data filed at 3/22/2022 1200  Gross per 24 hour   Intake 630 ml   Output 700 ml   Net -70 ml       Laboratory  Recent Labs     03/20/22  0028 03/21/22  0151 03/22/22  0320   WBC 12.3* 13.2* 15.2*   RBC 2.44* 2.43* 2.73*   HEMOGLOBIN 8.0* 8.0* 8.9*   HEMATOCRIT 25.3* 25.5* 27.7*   .7* 104.9* 101.5*   MCH 32.8 32.9 32.6   MCHC 31.6* 31.4* 32.1*   RDW 51.0* 50.1* 49.1   PLATELETCT 198 204 250   MPV 11.2 10.8 10.6     Recent Labs     03/20/22  0028 03/21/22  0151 03/22/22  0320   SODIUM 137 134* 136   POTASSIUM 4.4 4.4 4.5   CHLORIDE 101 100 102   CO2 26 25 24   GLUCOSE 106* 105* 104*   BUN 34* 33* 29*   CREATININE 0.94 0.81 0.76   CALCIUM 8.3* 7.9* 8.0*                   Imaging  CT-HEAD W/O   Final Result      1.  Again seen  intraparenchymal hemorrhage involving the right insular region with surrounding vasogenic edema and mass effect on the right lateral ventricle. The intracranial hemorrhage measures 3 x 2.2 cm in size and is not significantly changed from    prior comparison examination.      2.  Again seen low attenuation involving the right basal ganglia and periventricular white matter consistent with ischemic change.         CT-CHEST,ABDOMEN,PELVIS WITH   Final Result      1.  Hypodense pancreatic tail mass likely represents primary malignancy.      2.  Multiple hepatic masses are consistent with metastasis.      3.  Splenic hypodense masses likely represent metastasis.      4.  Diverticulosis.      5.  Atherosclerosis         EC-ECHOCARDIOGRAM COMPLETE W/O CONT   Final Result      MR-BRAIN-W/O   Final Result         Acute infarct in the right insula/basal ganglia/right caudate region.      Punctate foci of acute infarction involving the right frontoparietal cortex and subcortical region.      Multiple foci of acute infarction involving the bilateral inferior cerebellum.      Focal area of parenchymal hemorrhage in the right insular region with minimal surrounding edema and slight mass effect upon the right lateral ventricle.      Minimal subarachnoid hemorrhage in the sulci in the right temporoparietal region.      DX-CHEST-PORTABLE (1 VIEW)   Final Result      1.  No acute cardiac or pulmonary abnormalities are identified.      IR-THROMBO MECHANICAL ARTERY,INIT   Final Result         71-year-old patient who presented with acute onset of left-sided weakness and facial droop with an NIH stroke scale of 15 underwent emergent mechanical thrombectomy for an ICA terminus occlusion on the right side. The final angiographic images    demonstrate complete recanalization of the ICA, MCA on the right side with minimal distal embolization to a cortical M4 branch each in the superior and in the inferior division.      Final recanalization  score: TICI 2C      I, Abdirahman Zhu was physically present and participated during the entire procedure of the IR-THROMBO MECHANICAL ARTERY,INIT.                  CT-CEREBRAL PERFUSION ANALYSIS   Final Result      1.  Cerebral blood flow less than 30% likely representing completed infarct = 0 mL.      2.  T Max more than 6 seconds likely representing combination of completed infarct and ischemia = 121 mL.      3.  Mismatched volume likely representing ischemic brain/penumbra = infinite      4.  Please note that the cerebral perfusion was performed on the limited brain tissue around the basal ganglia region. Infarct/ischemia outside the CT perfusion sections can be missed in this study.      CT-CTA NECK WITH & W/O-POST PROCESSING   Final Result      1.  Mild bilateral atherosclerosis with less than 50% ICA stenosis.   2.  There is a nonspecific 4 mm posterior right upper lobe lung nodule.      Fleischner Society pulmonary nodule recommendations:   Low Risk: No routine follow-up      High Risk: Optional CT at 12 months      Comments: Nodules less than 6 mm do not require routine follow-up, but certain patients at high risk with suspicious nodule morphology, upper lobe location, or both may warrant 12-month follow-up.      Low Risk - Minimal or absent history of smoking and of other known risk factors.      High Risk - History of smoking or of other known risk factors.      Note: These recommendations do not apply to lung cancer screening, patients with immunosuppression, or patients with known primary cancer.      Fleischner Society 2017 Guidelines for Management of Incidentally Detected Pulmonary Nodules in Adults         CT-CTA HEAD WITH & W/O-POST PROCESS   Final Result      1.  There is a right M1 occlusion. Findings were discussed with Narcisa Chapman by Dr. Zhu the neurointerventional radiologist who has already seen the exam on 3/14/2022 at 3:35 PM.      CT-HEAD W/O   Final Result      1.  There are  periventricular and subcortical white matter changes present.  This finding is nonspecific and could be from indeterminate age small vessel ischemia, demyelination, or gliosis. MRI would be more sensitive for further evaluation.   2.  Mild paranasal sinus disease.                 Assessment/Plan  * Stroke (cerebrum) (HCC)- (present on admission)  Assessment & Plan  Status post right M1 thrombectomy  With mild to moderate ICH on MRI  Blood pressure control goal -130  PT/OT/SLP  Neurology recommending tostart eliquis on 3/22  Continue atorvastatin    SNF pending tolerance of eliquis  Neurology recommended Zio patch on discharge    UTI (urinary tract infection)  Assessment & Plan  UA positive of pyuria  Has leukocytosis  Urine culture shows Proteus Marabilis  ceftriaxone    Leukocytosis  Assessment & Plan  UA positive for pyuria   ceftriaxone  Monitor    Obesity  Assessment & Plan  Body mass index is 33.25 kg/m².   Outpatient weight loss counseling     DVT (deep venous thrombosis) (HCC)- (present on admission)  Assessment & Plan  Recent DVT was maintained on Eliquis- resumed on 3/22      Liver mass- (present on admission)  Assessment & Plan  CT findings concerning for metastatic pancreatic cancer  Discussed need for further work-up with biopsy and oncology referral once recovered from this acute hospitalization  Referral for intake oncology coordinator ordered  Discussed again CT findings and plan of care with outpatient further work-up with patient and  and daughter at the bedside    Anemia- (present on admission)  Assessment & Plan  Hemoglobin 9.4 stable    Paroxysmal SVT (supraventricular tachycardia) (HCC)  Assessment & Plan  Stable on metoprolol    GERD (gastroesophageal reflux disease)- (present on admission)  Assessment & Plan  Stable on Prilosec       VTE prophylaxis: SCDs/TEDs    I have performed a physical exam and reviewed and updated ROS and Plan today (3/22/2022). In review of yesterday's  note (3/21/2022), there are no changes except as documented above.

## 2022-03-22 NOTE — THERAPY
"Speech Language Pathology   Initial Assessment     Patient Name: Ceci Mata  AGE:  71 y.o., SEX:  female  Medical Record #: [id number]  Today's Date: 3/22/2022     Precautions  Precautions: Fall Risk,Swallow Precautions ( See Comments)  Comments: L inattention    Assessment    Patient is 71 y.o. female with a diagnosis of R CVA. See EMR H and P for complete hist.    Cognitive linguistic eval-Parts of NCSE and nonstand cognitive linguistic eval revealed mild to moderate cognitive linguistic deficits with severe STM deficit. Per NCSE scoring: orientation- 10/12=average; attention-8/8=average; memory-3/12=below severe; similarities-8/8=average; and judgement-6/6=average. Pt with flat affect and monotone speech. Pt with decreased awareness of the left during oral reading and writing tasks. Pt's clock drawing disorganized with decrease awareness of the left. Pt required min to mod cues to orally read 9 of her oral motor exercises. Pt able to complete 9 repetitions of the 9 exercises with min cues. Fair to good movement of the pt's weaker left side during the OMEX. These exercises were targeted to assess oral reading and reading compreh. Pt with mild oral residue post bfast with oral swab used. Pt with pocketing at the left lateral sulci.       Additional factors influencing patient status/progress: pt demonstrating awareness stating her biggest deficit is \"not being able to move.\"    Plan    Recommend Speech Therapy 5 times per week until therapy goals are met for the following treatments:  Dysphagia Training, Comprehension Training, Expression Training, Reading Training, Writing Training, and Cognitive-Linguistic Training.    Discharge Recommendations: Recommend post-acute placement for additional speech therapy services prior to discharge home       03/22/22 1101   Verbal Expression   Verbal Expression / Aphasia Eval (WDL) X   Verbal Output Conversation Supervision (5)   Auditory Comprehension   Auditory " "Comprehension (WDL) X   Understands Simple, Structured Conversation  Supervision (5)   Reading Comprehension   Reading Comprehension (WDL) X   Skilled Intervention Verbal Cueing   Comments severe reading compreh at the 3-4 sent level   Written Expression   Written Expression (WDL) X   Functional Writing: Name Within Functional Limits (6-7)   Formulates: Sentence Supervision (5)   Overall Legibility Within Functional Limits (6-7)   Dominant Hand Right   Skilled Intervention Verbal Cueing   Cognitive-Linguistic   Cognitive-Linguistic (WDL) X   Level of Consciousness Alert   Orientation Level Not Oriented to Day   Short Term Memory Severe (2)   Clock Drawing Disorganization;Poor Planning   Skilled Intervention Verbal Cueing   Patient / Family Goals   Patient / Family Goal #1 \"I am so thirsty\"   Goal #1 Outcome Goal met   Short Term Goals   Short Term Goal # 3 Pt will orally read and compreh at the sent level with 70% accuracy and min cues.   Goal Outcome  # 3 Goal not met   Session Information   Priority 3  (5x,RMCA,PU4/MT2, 1-1, cog done, has OMEX, pocketing on left.)     "

## 2022-03-22 NOTE — PROGRESS NOTES
Monitor Summary: SR 82-98, IL -0.16, QRS -0.09, QT -0.32, with rare/occasional PAC, rare PVC, bigeminy and PSVT up to 181 per strip from the monitor room.

## 2022-03-22 NOTE — PROGRESS NOTES
Monitor summary: SR 90-99, NV .20, QRS .12, QT .34 with occasional PVCs per strip from monitor room.

## 2022-03-22 NOTE — PROGRESS NOTES
Monitor Summary: SR 82-98, MS -0.12, QRS -0.08, QT -0.32, with rare PAC/PVC per strip from the monitor room.

## 2022-03-23 PROBLEM — R13.12 OROPHARYNGEAL DYSPHAGIA: Status: ACTIVE | Noted: 2022-01-01

## 2022-03-23 PROBLEM — I10 ESSENTIAL HYPERTENSION: Status: ACTIVE | Noted: 2022-01-01

## 2022-03-23 NOTE — DISCHARGE PLANNING
Anticipated Discharge Disposition:   [facility]    Action:    [facility] and [facility] today.  [facility] scheduled for 1300 today.  Pt and daughter agreeable.  Verbal for Cobra obtained.    Cobra signed by Dr. Feliciano.    DC packet given to bedside RN Ruth Ann.    Barriers to Discharge:    None    Plan:    DC

## 2022-03-23 NOTE — DISCHARGE SUMMARY
Discharge Summary    CHIEF COMPLAINT ON ADMISSION  Chief Complaint   Patient presents with   • Unilateral Weakness     Left sided   • Facial Droop       Reason for Admission  EMS     Admission Date  3/14/2022    CODE STATUS  Full Code    HPI & HOSPITAL COURSE    A 71-year-old woman with h/o DVT (AC with Eliquis), PSVT, GERD presented with left-sided weakness and facial droop. Work-up revealed a right M1 occlusion. She was not a candidate for lytic therapy due to anticoagulation and she underwent thrombectomy. Was admitted to the intensive care unit. Follow-up MRI revealed acute ischemic infarct in the right temporal region right MCA territory with mild to moderate hemorrhagic conversion.      Patient was given a statin due to her new diagnosis of stroke.  Patient was given GI prophylaxis with Prilosec due to her history of GERD.  Eliquis was held until 3/22/2022 at the recommendation of the neurologist.  Again at the recommendation of the neurologist,  Eliquis was resumed to reduce her overall stroke risk due to the concern that we believe that this patient is hypercoagulable.      Patient did have a urinary tract infection that was treated during her hospital stay with IV Rocephin      Patient was found to have incidental finding of pancreatic mass with mets and has been referred to the [facility] oncology coordinator who will contact the patient once discharged from rehab    Patient received PT and OT during her hospital stay.  Patient referred to a skilled facility for ongoing PT OT    Therefore, she is discharged in good and stable condition to skilled nursing facility.    The patient met 2-midnight criteria for an inpatient stay at the time of discharge.    Discharge Date  3/23    FOLLOW UP ITEMS POST DISCHARGE      DISCHARGE DIAGNOSES  Principal Problem:    Stroke (cerebrum) (HCC) POA: Yes  Active Problems:    Lung nodule < 6cm on CT POA: Yes    GERD (gastroesophageal reflux disease) POA: Yes    Paroxysmal SVT  (supraventricular tachycardia) (HCC) POA: No    Anemia POA: Yes    Liver mass POA: Yes    DVT (deep venous thrombosis) (HCC) POA: Yes    Obesity POA: Unknown    Leukocytosis POA: Yes    UTI (urinary tract infection) POA: Yes  Resolved Problems:    * No resolved hospital problems. *      FOLLOW UP  No future appointments.  [facility]  [address] [place] [address] [phone]  Call  Please call [facility] to establish with a primary care provider. Thank you.     [facility] [address] [id number] [phone]          MEDICATIONS ON DISCHARGE     Medication List      START taking these medications      Instructions   atorvastatin 80 MG tablet  Commonly known as: LIPITOR   Take 1 Tablet by mouth every evening.  Dose: 80 mg        CHANGE how you take these medications      Instructions   apixaban 5mg Tabs  What changed:   · how much to take  · when to take this  · additional instructions  Commonly known as: ELIQUIS   Take 1 Tablet by mouth 2 times a day. Indications: Thromboembolism secondary to Atrial Fibrillation  Dose: 5 mg     metoprolol tartrate 25 MG Tabs  What changed:   · when to take this  · additional instructions  Commonly known as: LOPRESSOR   Take 1 Tablet by mouth 2 times a day.  Dose: 25 mg     omeprazole 20 MG delayed-release capsule  What changed: See the new instructions.  Commonly known as: PRILOSEC   Take 1 Capsule by mouth 2 times a day.  Dose: 20 mg        STOP taking these medications    cephALEXin 500 MG Caps  Commonly known as: KEFLEX            Allergies  Allergies   Allergen Reactions   • Sulfa Drugs      itchy mouth and eyes       DIET  Orders Placed This Encounter   Procedures   • Diet Order Diet: Level 4 - Pureed (Crush meds in puree); Liquid level: Level 0 - Thin; Tray Modifications (optional): SLP - 1:1 Supervision by Nursing, SLP - Deliver to Nursing Station     Standing Status:   Standing     Number of  Occurrences:   1     Order Specific Question:   Diet:     Answer:   Level 4 - Pureed [25]     Comments:   Crush meds in puree     Order Specific Question:   Liquid level     Answer:   Level 0 - Thin     Order Specific Question:   Tray Modifications (optional)     Answer:   SLP - 1:1 Supervision by Nursing     Order Specific Question:   Tray Modifications (optional)     Answer:   SLP - Deliver to Nursing Station       ACTIVITY  As tolerated.  Weight bearing as tolerated    CONSULTATIONS  Dr lewis neuro    PROCEDURES  CT-HEAD W/O  Order: [id number]   Status: Final result     Visible to patient: No (inaccessible in MyChart)     Next appt: None     0 Result Notes    Details    Reading Physician Reading Date Result Priority   John Ortiz M.D.  [phone] 3/21/2022 Routine     Narrative & Impression     3/21/2022 1:19 PM     HISTORY/REASON FOR EXAM:  Follow-up intraparenchymal hemorrhage.     TECHNIQUE/EXAM DESCRIPTION AND NUMBER OF VIEWS:  CT of the head without contrast.     Up to date radiation dose reduction adjustments have been utilized to meet ALARA standards for radiation dose reduction.     COMPARISON: Brain MRI 3/15/2022     FINDINGS: There is again seen a right frontotemporal intraparenchymal hemorrhage which measures 3 x 2.2 cm in size. There is surrounding vasogenic edema. No significant change in size of the hemorrhage from comparison. There is some mass effect on the   right lateral ventricle. Low-attenuation extends into the right periventricular white matter likely representing ischemic change. Ischemic change also involves the right basal ganglia. The calvarium is intact. There is no scalp injury. There is no   evidence of sinus disease.        IMPRESSION:     1.  Again seen intraparenchymal hemorrhage involving the right insular region with surrounding vasogenic edema and mass effect on the right lateral ventricle. The intracranial hemorrhage measures 3 x 2.2 cm in size and is not significantly  changed from   prior comparison examination.     2.  Again seen low attenuation involving the right basal ganglia and periventricular white matter consistent with ischemic change.                Exam Ended: 03/21/22  4:13 PM Last Resulted: 03/21/22  1:45 PM        Order Details      View Encounter      Lab and Collection Details      Routing      Result History             Result Care Coordination        Patient Communication     Add Comments   Not seen Back to Top             CT-HEAD W/O [[id number]]        ======================================================    CT-CHEST,ABDOMEN,PELVIS WITH  Order: [id number]   Status: Final result     Visible to patient: No (inaccessible in [employer])     Next appt: None     0 Result Notes    Details    Reading Physician Reading Date Result Priority   Dalila Monteiro M.D.  [phone] 3/16/2022 Routine     Narrative & Impression     3/16/2022 5:12 PM     HISTORY/REASON FOR EXAM:  Lymphadenopathy. Liver nodules and chest mass.        TECHNIQUE/EXAM DESCRIPTION:  CT scan of the chest, abdomen and pelvis with contrast.     Thin-section helical scanning was obtained with intravenous contrast from the lung apices through the pubic symphysis to include the chest, abdomen and pelvis. 100 mL of Omnipaque 350 nonionic contrast was administered intravenously without complication.     Low dose optimization technique was utilized for this CT exam including automated exposure control and adjustment of the mA and/or kV according to patient size.     COMPARISON: None.     FINDINGS:  CT Chest:  Lungs: There is bilateral lower lobe atelectasis and axial.     Pleura: No pleural effusion.     Mediastinum/Stephanie: No significant adenopathy.     Cardiac: Normal cardiac size. No significant pericardial effusion.     Vascular: Moderate scattered arterial calcifications.     Soft tissues: Unremarkable.     Bones: Degenerative changes are in the spine.           CT Abdomen and Pelvis:  Liver: Multiple  hypodense masses are scattered throughout the liver. Index lesions are as follows:     Right hepatic lobe on image 136, series 2 measures 2.3 x 3.7 cm.  Left hepatic lobe on image 141, series 2 measures 3.3 x 3.4 cm.  Hepatic dome image 22, series 2 measures 1.6 x 1.4 cm.     Spleen: Multiple hypodense masses are scattered throughout the spleen.     Adrenal glands: Normal.     Biliary: Nondilated.     Gallbladder: Layering contrast or sludge in the gallbladder.     Pancreas: Hypodense mass in the pancreatic tail measures approximately 4.6 x 2.8 cm. The mass extends towards the splenic hilum and abuts the left anterior pararenal fascia.     Kidneys: Symmetric nephrograms. Small hypodense lesions in the right kidney are too small to characterize, but likely represent cysts. No additional follow-up is recommended. 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     Bowel: There are scattered colonic diverticula without associated inflammation. No pneumoperitoneum.     Lymph nodes: Prominent periportal lymph nodes measure up to approximately 11 mm short axis.     Vasculature: Moderately severe scattered arterial calcifications. Nonaneurysmal aorta.     Ascites: None present..     Pelvis: The bladder is unremarkable. The uterus has been removed.     Musculoskeletal: Degenerative changes are in the spine.           IMPRESSION:     1.  Hypodense pancreatic tail mass likely represents primary malignancy.     2.  Multiple hepatic masses are consistent with metastasis.     3.  Splenic hypodense masses likely represent metastasis.     4.  Diverticulosis.     5.  Atherosclerosis            LABORATORY  Lab Results   Component Value Date    SODIUM 137 03/23/2022    POTASSIUM 4.2 03/23/2022    CHLORIDE 102 03/23/2022    CO2 26 03/23/2022    GLUCOSE 104 (H) 03/23/2022    BUN 29 (H) 03/23/2022    CREATININE 0.71 03/23/2022        Lab Results   Component Value Date    WBC 15.8 (H) 03/23/2022    HEMOGLOBIN 8.8 (L)  03/23/2022    HEMATOCRIT 27.5 (L) 03/23/2022    PLATELETCT 246 03/23/2022        Total time of the discharge process exceeds 38  minutes.

## 2022-03-23 NOTE — DISCHARGE PLANNING
Agency[facility] Name: [facility]   Outcome: DPA left a voicemail for Amita regarding bed availability. DPA requested a call back.     1011  Agency/Facility Name: [facility]   Spoke To: [facility]   Outcome: Per [facility] everything is good to go. Transportation I set for 1300 today.   RN BERNARD Notified

## 2022-03-23 NOTE — PROGRESS NOTES
Monitor summary: SR 88-98, IA .23, QRS 10, QT .35, with occasional PVCs per strip from monitor room.

## 2022-03-24 NOTE — PROGRESS NOTES
[facility]. Report given to Mami. All belongings accounted for at time of discharge, sent home with daughter. PIV removed, dressing applied. Zio patch applied prior to discharge, registered in Zio Suite. Voicemail left with [facility] scheduling to schedule pt a stroke bridge clinic appointment as well as a cardiology appointment.

## 2022-03-26 PROBLEM — K59.00 CONSTIPATION: Status: ACTIVE | Noted: 2022-01-01

## 2022-03-26 PROBLEM — Z74.09 IMPAIRED MOBILITY: Status: ACTIVE | Noted: 2022-01-01

## 2022-03-26 PROBLEM — K86.89 PANCREATIC MASS: Status: ACTIVE | Noted: 2022-01-01

## 2022-03-31 PROBLEM — G47.19 EXCESSIVE DAYTIME SLEEPINESS: Status: ACTIVE | Noted: 2022-01-01

## 2022-04-05 PROBLEM — R09.02 HYPOXIA: Status: ACTIVE | Noted: 2022-01-01

## 2022-04-06 PROBLEM — R11.0 NAUSEA: Status: ACTIVE | Noted: 2022-01-01

## 2022-04-15 PROBLEM — I46.9 CARDIAC ARREST (HCC): Status: ACTIVE | Noted: 2022-01-01

## 2022-04-16 PROBLEM — I74.5 ILIAC ARTERY OCCLUSION (HCC): Status: ACTIVE | Noted: 2022-01-01

## 2022-04-16 PROBLEM — I62.9 INTRACRANIAL HEMORRHAGE (HCC): Status: ACTIVE | Noted: 2022-01-01

## 2022-04-16 PROBLEM — E87.20 LACTIC ACIDOSIS: Status: ACTIVE | Noted: 2022-01-01

## 2022-04-16 PROBLEM — J96.00 ACUTE RESPIRATORY FAILURE (HCC): Status: ACTIVE | Noted: 2022-01-01

## 2022-04-16 NOTE — ED TRIAGE NOTES
Pt was BIBA from [facility] for   Chief Complaint   Patient presents with   • ALOC   • Failure to Thrive     Pt noted to have decreased mentation [facility]. Pt found to be more difficult to arouse by EMS while in route. Pt arrived with EMS utilizing BVM to assist with breathing. ERP bedside. Pt given 0.5mg of atropine IV for HR consistently in the 30s upon arrival.

## 2022-04-16 NOTE — ED NOTES
2129 code initiated, multiple staff and ERP at bedside, compressions started  2132 pulse check, no pulse, compressions restarted  2133 code stopped, family at bedside

## 2022-04-16 NOTE — DISCHARGE PLANNING
Medical Social Work     SW met with the pt  Todd and provided him with emotional support. SW also went over next steps with him and provided a mortuary list. The pt  Todd does not have a mortuary picked out at this time.     SW will remain available for pt support.

## 2022-04-16 NOTE — ASSESSMENT & PLAN NOTE
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

## 2022-04-16 NOTE — ASSESSMENT & PLAN NOTE
CTH showing right caudate acute hemorrhage   NSGY was consulted - Dr. Esteves who recommended no acute neurosurgical intervention.   Patient was ultimately made comfort care

## 2022-04-16 NOTE — ED PROVIDER NOTES
"ED Provider Note    CHIEF COMPLAINT  Chief Complaint   Patient presents with   • ALOC   • Failure to Thrive       HPI  Ceci Mata is a 71 y.o. female who presents to the emergency department with acute altered mental status. Past medical history as documented below but significant for stroke last month. Currently at rehab. Significant clinical decline this week with profound change in mental status tonight. In route from rehab to this facility this evening while under paramedic care she had an acute loss of consciousness and awareness. While she was initially converse of upon their initial interaction with the patient again she had this acute changes prior to arrival.    Paramedics again note her recent stroke as well as reported discussion of possible pancreatitis. There were noted abnormal findings on recent CT imaging of her liver and possible pancreas.    No history from patient given her current clinical condition upon arrival    REVIEW OF SYSTEMS  See HPI for further details. All other systems are negative.     PAST MEDICAL HISTORY   has a past medical history of Arrhythmia, Arthritis, At risk for sleep apnea, Blood clotting disorder (HCC), Cancer (HCC), Hemorrhagic disorder (HCC), Hypertension, and UTI (urinary tract infection) (3/20/2022).    SOCIAL HISTORY  Social History     Tobacco Use   • Smoking status: Never Smoker   • Smokeless tobacco: Never Used   Substance and Sexual Activity   • Alcohol use: Not on file   • Drug use: Not on file   • Sexual activity: Not on file       SURGICAL HISTORY  patient denies any surgical history    CURRENT MEDICATIONS  Home Medications    **Home medications have not yet been reviewed for this encounter**         ALLERGIES  Allergies   Allergen Reactions   • Sulfa Drugs      itchy mouth and eyes       PHYSICAL EXAM  VITAL SIGNS: BP (!) 185/100   Pulse (!) 0   Resp (!) 0   Ht 1.651 m (5' 5\")   Wt 93 kg (205 lb)   SpO2 (!) 82%   BMI 34.11 kg/m²  @JUANA[[id number]::@ "   Pulse ox interpretation: I interpret this pulse ox as normal.  Constitutional: uptime did  HENT: No signs of trauma  Eyes: fixed and dilated and symmetric  Neck: Normal range of motion, No tenderness, Supple  Cardiovascular: alternating tachycardia bradycardia  Thorax & Lungs: slow respiratory rate diagonal respirations  Abdomen: obese, soft, mottled  Skin: cold and mottled  Musculoskeletal: no signs of trauma or significant deformity.  Neurologic: attended, GCS: three.       DIAGNOSTIC STUDIES / PROCEDURES      LABS  Results for orders placed or performed during the hospital encounter of 04/15/22   COMP METABOLIC PANEL   Result Value Ref Range    Sodium 124 (L) 135 - 145 mmol/L    Potassium 7.3 (HH) 3.6 - 5.5 mmol/L    Chloride 88 (L) 96 - 112 mmol/L    Co2 6 (LL) 20 - 33 mmol/L    Anion Gap 30.0 (H) 7.0 - 16.0    Glucose 45 (LL) 65 - 99 mg/dL    Bun 57 (H) 8 - 22 mg/dL    Creatinine 3.56 (H) 0.50 - 1.40 mg/dL    Calcium 7.4 (L) 8.5 - 10.5 mg/dL    AST(SGOT) 1412 (H) 12 - 45 U/L    ALT(SGPT) 244 (H) 2 - 50 U/L    Alkaline Phosphatase 636 (H) 30 - 99 U/L    Total Bilirubin 2.5 (H) 0.1 - 1.5 mg/dL    Albumin 1.8 (L) 3.2 - 4.9 g/dL    Total Protein 5.6 (L) 6.0 - 8.2 g/dL    Globulin 3.8 (H) 1.9 - 3.5 g/dL    A-G Ratio 0.5 g/dL   HCG QUAL SERUM   Result Value Ref Range    Beta-Hcg Qualitative Serum Negative Negative   DIAGNOSTIC ALCOHOL   Result Value Ref Range    Diagnostic Alcohol <10.1 0.0 - 10.0 mg/dL   Lipase   Result Value Ref Range    Lipase 408 (H) 11 - 82 U/L   Lactic Acid   Result Value Ref Range    Lactic Acid 14.4 (HH) 0.5 - 2.0 mmol/L   Magnesium   Result Value Ref Range    Magnesium 3.5 (H) 1.5 - 2.5 mg/dL   Phosphorus   Result Value Ref Range    Phosphorus 10.7 (HH) 2.5 - 4.5 mg/dL   Troponin   Result Value Ref Range    Troponin T 395 (H) 6 - 19 ng/L   Sed Rate   Result Value Ref Range    Sed Rate Westergren 23 0 - 25 mm/hour   proBrain Natriuretic Peptide, NT   Result Value Ref Range    NT-proBNP 7297  (H) 0 - 125 pg/mL   Prothrombin Time   Result Value Ref Range    PT >120.0 (HH) 12.0 - 14.6 sec    INR >=10.00 (HH) 0.87 - 1.13   APTT   Result Value Ref Range    APTT 198.5 (HH) 24.7 - 36.0 sec   ESTIMATED GFR   Result Value Ref Range    GFR (CKD-EPI) 13 (A) >60 mL/min/1.73 m 2   CBC WITH DIFFERENTIAL   Result Value Ref Range    WBC 24.3 (H) 4.8 - 10.8 K/uL    RBC 2.39 (L) 4.20 - 5.40 M/uL    Hemoglobin 7.9 (L) 12.0 - 16.0 g/dL    Hematocrit 27.7 (L) 37.0 - 47.0 %    .9 (H) 81.4 - 97.8 fL    MCH 33.1 (H) 27.0 - 33.0 pg    MCHC 28.5 (L) 33.6 - 35.0 g/dL    RDW 75.8 (H) 35.9 - 50.0 fL    Platelet Count 55 (L) 164 - 446 K/uL    MPV 13.7 (H) 9.0 - 12.9 fL    Neutrophils-Polys 82.00 (H) 44.00 - 72.00 %    Lymphocytes 6.30 (L) 22.00 - 41.00 %    Monocytes 3.60 0.00 - 13.40 %    Eosinophils 0.90 0.00 - 6.90 %    Basophils 0.90 0.00 - 1.80 %    Nucleated RBC 0.40 /100 WBC    Neutrophils (Absolute) 20.36 (H) 2.00 - 7.15 K/uL    Lymphs (Absolute) 1.53 1.00 - 4.80 K/uL    Monos (Absolute) 0.87 (H) 0.00 - 0.85 K/uL    Eos (Absolute) 0.22 0.00 - 0.51 K/uL    Baso (Absolute) 0.22 (H) 0.00 - 0.12 K/uL    NRBC (Absolute) 0.09 K/uL    Anisocytosis 2+ (A)     Macrocytosis 2+ (A)    DIFFERENTIAL MANUAL   Result Value Ref Range    Bands-Stabs 1.80 0.00 - 10.00 %    Metamyelocytes 3.60 %    Progranulocytes 0.90 %    Manual Diff Status PERFORMED    PERIPHERAL SMEAR REVIEW   Result Value Ref Range    Peripheral Smear Review see below    PLATELET ESTIMATE   Result Value Ref Range    Plt Estimation Marked Decrease    MORPHOLOGY   Result Value Ref Range    RBC Morphology Present     Polychromia 1+     Poikilocytosis 1+     Schistocytes 1+     Echinocytes 1+    IMMATURE PLT FRACTION   Result Value Ref Range    Imm. Plt Fraction 12.9 0.6 - 13.1 K/uL         RADIOLOGY  CT-CTA CHEST PULMONARY ARTERY W/ RECONS   Final Result      1.  No CT evidence for pulmonary emboli.   2.  Bilateral dependent atelectasis.   3.  Endotracheal tube tip  just above the aggie.            CT-ABDOMEN-PELVIS WITH   Final Result      1.  Occlusion of LEFT iliac artery proximally with clot extending into the common femoral artery.   2.  Probable LEFT iliac vein occlusion as well.   3.  Distal pancreatic mass again seen.   4.  Heterogeneous liver, difficult to assess on arterial phase imaging, with known metastasis on recent prior CT.   5.  Wall thickening of ascending and proximal transverse colon suggesting colitis, of uncertain etiology.  Ischemic colitis is not excluded.      These findings were discussed with JHONY LONDONO on 4/15/2022 9:12 PM.      CT-HEAD W/O   Final Result      1.  New hyperdensity in the right caudate body acute hemorrhage.      2.  Previously described hemorrhage in the right frontotemporal lobe has decreased in density from the prior exam with residual edema/evolving encephalomalacia which extends towards the right basal ganglia and caudate body.                  Comment: Results called to Dr. Londono at approximately 9:02 PM      DX-CHEST-LIMITED (1 VIEW)   Final Result      1.  Endotracheal tube tip projects approximately 1.6 cm above the aggie      2.  Blunted left costophrenic angle likely represents a small pleural effusion with adjacent atelectasis        discussed case with neurosurgeon Dr. Esteves as well as vascular surgeon Dr. Magaña.     Procedure note: intubation: patient was receiving assisted bag valve respirations by paramedics upon arrival. Patient does not have Lon is not responsive. Acute respiratory failure. Emergent RSI was undertaken. Full monitoring in place. Prior to the procedure patient was having intermittent runs of bradycardia as well as tachycardia. Accommodate and rock uranium were used for RSI. [place] scope was used to pass an 8.0 ET tube. Cords were easily visualized. Good colorimeter change. Equal breath sounds and no epigastric sounds. Chest x-ray confirming location.    Central line: emergent placement given  patient deterioration. Right IJ was evaluated with ultrasound identifying landmarks initially. Patient was then prepped and draped in sterile fashion. Chlorhexidine was used for initial skin cleaning. Initial needle insertion and wire guidance was successful however triple lumen catheter was unable to be advanced over the wire with this initial location. After complete removal from initial dermal puncture site a second puncture site was made and successful insertion with ultrasound guidance was used throughout the procedure. This triple lumen tube was then secured and placed using secure device and sutured material. While patch and pachyderm used to secure and cover the line.    CRITICAL CARE  The very real possibilty of a deterioration of this patient's condition required the highest level of my preparedness for sudden, emergent intervention.  I provided critical care services, which included medication orders, frequent reevaluations of the patient's condition and response to treatment, ordering and reviewing test results, and discussing the case with various consultants.  The critical care time associated with the care of the patient was 60 minutes. Review chart for interventions. This time is exclusive of any other billable procedures.       COURSE & MEDICAL DECISION MAKING  Pertinent Labs & Imaging studies reviewed. (See chart for details)    71-year-old female presented emergency department with the above presentation. Paramedics related the family and the patient upon their initial arrival stated that she was a full court and wanted all efforts to be made. Again the patient did have acute decompensation while in route to the hospital. Emergent intubation as well as central line placement was completed. Remaining workup was then followed after the patient was stable for such investigative abilities. Labs and imaging as above. Radiologists have conveyed to me the abnormalities both intracranial he and  intra-abdominal he. I did have concern for possible pulmonary embolus especially given the fact that she has been bedbound and is under investigation for and oncologic workup. There was no acute pulmonary embolus or intra-thoracic pathology noted. Again I discussed the case with both neurosurgery and vascular surgery given the above presentation. Vascular surgery stated that they cannot complete any intra-abdominal procedures especially since this would require heparin and this would be a contraindication given the head bleed. Similarly I have discussed the case with neurosurgery was states this is a non-neurosurgical lesion and would otherwise be followed by neurology but given the global context likely no further intervention can be acutely made at this point.    I have updated the family with regards to her poor prognosis. She has remained without any further sedation or intervention after intubation and does not appear to have any neurologic function at this point. With this stated the family elects to proceed towards comfort care and have rescinded the full code desires that were initially made.      FINAL IMPRESSION  acute intracranial hemorrhage  acute iliac occlusion  acute bowel ischemia  multiorgan failure  respiratory failure      Electronically signed by: Luis Rosado M.D., 4/16/2022 1:08 AM

## 2022-04-16 NOTE — ED NOTES
Pt transported to [facility] with security, death paperwork with pt.

## 2022-04-16 NOTE — DISCHARGE PLANNING
Referral: Acute Medical Patient Response    Intervention: SW responded to acute medical patient.  Pt was DOMENICA BONILLA from [facility]. Pts name is Ceci Mata (: 1950).  SW was unable to obtain emergency contact information from the Patient so SW reviewed Pt information from [facility].  SW contacted Pt's DTR, Violet and updated her that the Pt had arrived.  Pt's Spouse, Todd arrived shortly after and this SW was present with him while ERP updated the family on Pt current medical condition.      Spouse: Todd Mata [phone]  DTR: Violet Mata [phone]    Plan: SW will continue to monitor and assist if needs arise.

## 2022-04-16 NOTE — ED NOTES
Decision made by Dr. Rosado for RSI of pt:  1958: Time out called and all neccessary staff in room including RT.   1959: 20mg of etomidate given.   2000: 80mg of Rocuronium given. Intubation successful with color change noted. 22 at the teeth.   2001: Pt placed on ventilator. RSI successful with no adverse reactions. VSS.

## 2022-04-16 NOTE — PROGRESS NOTES
Discussed with patients spouse with ER physician bedside. They have opted for comfort care at this time. RT called for extubation.

## 2022-04-16 NOTE — H&P
Hospital Medicine History & Physical Note    Date of Service  4/15/2022    Primary Care Physician  No primary care provider on file.    Consultants  neurosurgery and vascular surgery    Specialist Names:   Neurosurgery - Dr. Esteves   Vascular surgery - Dr. Natarajan     Code Status  Comfort Care/DNR    Chief Complaint  Chief Complaint   Patient presents with   • ALOC   • Failure to Thrive       History of Presenting Illness  Ceci Mata is a 71 y.o. female with past medical history of hypertension, receent CVA who presented 4/15/2022 with altered mental status.  History is obtained entirely from chart review given as patient is unable to provide history as she is intubated and on mechanical ventilator on my examination.  As per chart review she had a embolic stroke showing right M1 occlusion on 3/14/2022 and had thrombectomy done. She was admitted to ICU during that stay. Follow up MRI during that time showed acute ischemic infarct in the right temporal region in right MCA territory with mild to moderate hemorrhagic conversion.    [facility] was found to have decreased mentation by staff at [facility]. Heart rate was in the 30's, given atropine decision was made to intubate the patient as she was in distress. RSI was done by ERP , patient had code blue at 9:29 in er and and ACLS was started. After ERP discussion with family patient was made comfort care. She was on ventilator upon my exam and I confirmed with patients spouse that she is comfort care. Decision was made to extubate the patient with spouse's approval.     Medicine consulted for admission for Comfort care and to admit to passing room.     I discussed the plan of care with patient.    Review of Systems  Review of Systems   Unable to perform ROS: Patient unresponsive       Past Medical History   has a past medical history of Arrhythmia, Arthritis, At risk for sleep apnea, Blood clotting disorder (HCC), Cancer (HCC),  Hemorrhagic disorder (HCC), Hypertension, and UTI (urinary tract infection) (3/20/2022).    Surgical History   has no past surgical history on file.     Family History  family history is not on file.   Family history reviewed with patient. There is no family history that is pertinent to the chief complaint.     Social History   reports that she has never smoked. She has never used smokeless tobacco.    Allergies  Allergies   Allergen Reactions   • Sulfa Drugs      itchy mouth and eyes       Medications  Prior to Admission Medications   Prescriptions Last Dose Informant Patient Reported? Taking?   apixaban (ELIQUIS) 5mg Tab   No No   Sig: Take 1 Tablet by mouth 2 times a day. Indications: Thromboembolism secondary to Atrial Fibrillation   atorvastatin (LIPITOR) 80 MG tablet   No No   Sig: Take 1 Tablet by mouth every evening.   metoprolol tartrate (LOPRESSOR) 25 MG Tab   No No   Sig: Take 1 Tablet by mouth 2 times a day.   omeprazole (PRILOSEC) 20 MG delayed-release capsule   No No   Sig: Take 1 Capsule by mouth 2 times a day.      Facility-Administered Medications: None       Physical Exam  Pulse:  [] 104  Resp:  [12-20] 20  BP: (135-185)/() 185/100  SpO2:  [91 %-100 %] 93 %  Blood Pressure : (!) 185/100       Pulse: (!) 104   Respiration: 20   Pulse Oximetry: 93 %       Physical Exam  Vitals and nursing note reviewed.   Constitutional:       Appearance: She is obese. She is ill-appearing and toxic-appearing.   HENT:      Head: Normocephalic and atraumatic.      Right Ear: Tympanic membrane normal.      Left Ear: Tympanic membrane normal.      Nose: Nose normal.      Mouth/Throat:      Mouth: Mucous membranes are moist.      Pharynx: Oropharynx is clear.   Eyes:      General:         Right eye: No discharge.         Left eye: No discharge.      Comments: Bilateral pupils fixed and dilated    Cardiovascular:      Rate and Rhythm: Normal rate and regular rhythm.      Pulses: Normal pulses.      Heart  sounds: Normal heart sounds.   Pulmonary:      Effort: Respiratory distress present.      Breath sounds: No stridor. No wheezing or rhonchi.      Comments: Intubated on mechanical ventilation   Bilateral mechanical breath sounds appreciated   Abdominal:      General: Bowel sounds are normal. There is no distension.      Palpations: Abdomen is soft. There is no mass.      Tenderness: There is no abdominal tenderness.      Hernia: No hernia is present.   Musculoskeletal:      Right lower leg: Edema present.      Left lower leg: Edema present.   Skin:     Capillary Refill: Capillary refill takes less than 2 seconds.      Comments: Cool with mottled extremities    Psychiatric:         Mood and Affect: Mood normal.         Behavior: Behavior normal.         Laboratory:  Recent Labs     04/15/22  2113   WBC 24.3*   RBC 2.39*   HEMOGLOBIN 7.9*   HEMATOCRIT 27.7*   .9*   MCH 33.1*   MCHC 28.5*   RDW 75.8*   PLATELETCT 55*   MPV 13.7*     Recent Labs     04/15/22  2018   SODIUM 124*   POTASSIUM 7.3*   CHLORIDE 88*   CO2 6*   GLUCOSE 45*   BUN 57*   CREATININE 3.56*   CALCIUM 7.4*     Recent Labs     04/15/22  2018 04/15/22  2028   ALTSGPT 244*  --    ASTSGOT 1412*  --    ALKPHOSPHAT 636*  --    TBILIRUBIN 2.5*  --    LIPASE  --  408*   GLUCOSE 45*  --      Recent Labs     04/15/22  2028   APTT 198.5*   INR >=10.00*     Recent Labs     04/15/22  2028   NTPROBNP 7297*         Recent Labs     04/15/22  2028   TROPONINT 395*       Imaging:  CT-CTA CHEST PULMONARY ARTERY W/ RECONS   Final Result      1.  No CT evidence for pulmonary emboli.   2.  Bilateral dependent atelectasis.   3.  Endotracheal tube tip just above the aggie.            CT-ABDOMEN-PELVIS WITH   Final Result      1.  Occlusion of LEFT iliac artery proximally with clot extending into the common femoral artery.   2.  Probable LEFT iliac vein occlusion as well.   3.  Distal pancreatic mass again seen.   4.  Heterogeneous liver, difficult to assess on  arterial phase imaging, with known metastasis on recent prior CT.   5.  Wall thickening of ascending and proximal transverse colon suggesting colitis, of uncertain etiology.  Ischemic colitis is not excluded.      These findings were discussed with JHONY LONDONO on 4/15/2022 9:12 PM.      CT-HEAD W/O   Final Result      1.  New hyperdensity in the right caudate body acute hemorrhage.      2.  Previously described hemorrhage in the right frontotemporal lobe has decreased in density from the prior exam with residual edema/evolving encephalomalacia which extends towards the right basal ganglia and caudate body.                  Comment: Results called to Dr. Londono at approximately 9:02 PM      DX-CHEST-LIMITED (1 VIEW)   Final Result      1.  Endotracheal tube tip projects approximately 1.6 cm above the aggie      2.  Blunted left costophrenic angle likely represents a small pleural effusion with adjacent atelectasis          X-Ray:  I have personally reviewed the images and compared with prior images.    Assessment/Plan:  I anticipate this patient is appropriate for observation status at this time.    * Intracranial hemorrhage (HCC)  Assessment & Plan  CTH showing right caudate acute hemorrhage   NSGY was consulted - Dr. Esteves who recommended no acute neurosurgical intervention.   Patient was ultimately made comfort care     Lactic acidosis  Assessment & Plan  Secondary to acute intraabdominal pathology     Acute respiratory failure (HCC)  Assessment & Plan  S/p intubation in ER   Comfort care elected by family. Patient was extubated in the ER.     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     Essential  hypertension- (present on admission)  Assessment & Plan  Patient unresponsive on comfort care.       Stroke (cerebrum) (HCC)- (present on admission)  Assessment & Plan  History of Recent CVA s/p thrombectomy in 3/2022.       VTE prophylaxis: pharmacologic prophylaxis contraindicated due to comfort care

## 2022-04-18 ENCOUNTER — TELEPHONE (OUTPATIENT)
Dept: CARDIOLOGY | Facility: MEDICAL CENTER | Age: 72
End: 2022-04-18
Payer: MEDICARE

## 2022-04-20 LAB
BACTERIA BLD CULT: NORMAL
SIGNIFICANT IND 70042: NORMAL
SITE SITE: NORMAL
SOURCE SOURCE: NORMAL

## 2022-04-22 NOTE — DISCHARGE SUMMARY
Death Summary    Cause of Death  Cardiac arrest due to respiratory failure due to intracranial hemorrhage and multiorgan failure.    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 *      History of Presenting Illness and Hospital Course  This is a 71 y.o. female admitted 4/15/2022 with  with altered mental status.  History was obtained entirely from chart review given as patient is unable to provide history as she is intubated and on mechanical ventilator on my examination. [facility] patient was found to have decreased mentation by staff at [facility]. Heart rate was in the 30's, given atropine decision was made to intubate the patient as she was in distress. RSI was done by ERP. In the ER, CT head was done which showed acute intracranial hemorrhage. Neurosurgery was consulted and recommended no acute intervention given patients critical condition. CT Abdomen done also showed left iliac artery occlusion, vascular surgery was consulted however given patients acute intracranial bleed heparin gtt is contraindicated. After ERP discussion with family patient was made comfort care. She was on ventilator upon my exam and I confirmed with patients spouse that she is comfort care. Decision was made to extubate the patient with spouse's approval.       As per chart review she had a embolic stroke showing right M1 occlusion on 3/14/2022 and had thrombectomy done.  She was admitted to ICU during that stay. Follow up MRI during that time showed acute ischemic infarct in the right temporal region in right MCA territory with mild to moderate hemorrhagic conversion.   made to extubate the patient with spouse's approval.     Death Date: 04/16/22   Death Time: 0002         Pronounced By (RN1): Shirley  Pronounced By (RN2): Rudy